# Patient Record
Sex: FEMALE | Race: WHITE | NOT HISPANIC OR LATINO | Employment: FULL TIME | RURAL
[De-identification: names, ages, dates, MRNs, and addresses within clinical notes are randomized per-mention and may not be internally consistent; named-entity substitution may affect disease eponyms.]

---

## 2017-03-07 ENCOUNTER — OFFICE VISIT (OUTPATIENT)
Dept: FAMILY MEDICINE CLINIC | Facility: CLINIC | Age: 62
End: 2017-03-07

## 2017-03-07 VITALS
SYSTOLIC BLOOD PRESSURE: 104 MMHG | DIASTOLIC BLOOD PRESSURE: 78 MMHG | BODY MASS INDEX: 27.75 KG/M2 | TEMPERATURE: 97.6 F | HEIGHT: 67 IN | OXYGEN SATURATION: 99 % | WEIGHT: 176.8 LBS | HEART RATE: 69 BPM

## 2017-03-07 DIAGNOSIS — Z01.818 PREOPERATIVE EVALUATION TO RULE OUT SURGICAL CONTRAINDICATION: Primary | ICD-10-CM

## 2017-03-07 PROBLEM — E78.2 MIXED HYPERLIPIDEMIA: Status: ACTIVE | Noted: 2017-03-07

## 2017-03-07 PROCEDURE — 99213 OFFICE O/P EST LOW 20 MIN: CPT | Performed by: FAMILY MEDICINE

## 2017-03-07 RX ORDER — FLUTICASONE PROPIONATE 50 MCG
SPRAY, SUSPENSION (ML) NASAL
Refills: 3 | COMMUNITY
Start: 2017-02-06 | End: 2017-11-28 | Stop reason: SDUPTHER

## 2017-03-07 RX ORDER — INDAPAMIDE 1.25 MG/1
TABLET, FILM COATED ORAL
Refills: 1 | COMMUNITY
Start: 2017-01-11 | End: 2017-10-03 | Stop reason: SDUPTHER

## 2017-03-07 NOTE — PROGRESS NOTES
"Kisha Gracia Vidhi    1955    Chief Complaint   Patient presents with   • Pre-op Exam       Vitals:    03/07/17 1533   BP: 104/78   Pulse: 69   Temp: 97.6 °F (36.4 °C)   SpO2: 99%   Weight: 176 lb 12.8 oz (80.2 kg)   Height: 67\" (170.2 cm)       Knee Pain    The incident occurred more than 1 week ago. The incident occurred at home. There was no injury mechanism. The pain is present in the right knee. The quality of the pain is described as aching. The pain is at a severity of 3/10. The pain is mild. The pain has been constant since onset. Associated symptoms include an inability to bear weight. She reports no foreign bodies present. Nothing aggravates the symptoms. She has tried acetaminophen for the symptoms. Improvement on treatment: Orthopedic evaluation shows bone-on-bone and total knee replacement advised.       Review of Systems   HENT: Negative.    Respiratory: Negative.    Cardiovascular: Negative.    Gastrointestinal: Negative.    Neurological: Negative.        Past Medical History   Diagnosis Date   • Hyperlipidemia          Current Outpatient Prescriptions:   •  fluticasone (FLONASE) 50 MCG/ACT nasal spray, INHALE 2 SPRAYS INTO NASAL TWICE A DAY, Disp: , Rfl: 3  •  indapamide (LOZOL) 1.25 MG tablet, TAKE 1 TABLET BY MOUTH EVERY DAY, Disp: , Rfl: 1    No Known Allergies    Patient Active Problem List   Diagnosis   • Mixed hyperlipidemia       Social History     Social History   • Marital status:      Spouse name: N/A   • Number of children: N/A   • Years of education: N/A     Social History Main Topics   • Smoking status: Never Smoker   • Smokeless tobacco: Never Used   • Alcohol use No   • Drug use: No   • Sexual activity: Defer     Other Topics Concern   • None     Social History Narrative       Past Surgical History   Procedure Laterality Date   • Colonoscopy  08/26/2016     [facility] DR TRUJILLO       Physical Exam   Constitutional: She appears well-developed and well-nourished.   HENT: " "  Mouth/Throat: Oropharynx is clear and moist.   Cardiovascular: Normal rate and regular rhythm.    Pulmonary/Chest: Effort normal and breath sounds normal. She has no wheezes. She has no rales.   Abdominal: Soft. Bowel sounds are normal.   Neurological: She is alert.   Psychiatric: She has a normal mood and affect.   Vitals reviewed.      Vitals:    03/07/17 1533   BP: 104/78   Pulse: 69   Temp: 97.6 °F (36.4 °C)   SpO2: 99%   Weight: 176 lb 12.8 oz (80.2 kg)   Height: 67\" (170.2 cm)       Kisha was seen today for pre-op exam.    Diagnoses and all orders for this visit:    Preoperative evaluation to rule out surgical contraindication        Problem List Items Addressed This Visit     None      Visit Diagnoses     Preoperative evaluation to rule out surgical contraindication    -  Primary                  In general physical appropriate for a knee replacement on right-pre-op chest x-ray, EKG ,laboratory, urinalysis at [facility] was appropriate-I do not have copies but I assume it was reviewed by the orthopedist                    Rivera Cunningham MD  3/7/2017  "

## 2017-04-25 ENCOUNTER — TRANSCRIBE ORDERS (OUTPATIENT)
Dept: PHYSICAL THERAPY | Facility: HOSPITAL | Age: 62
End: 2017-04-25

## 2017-04-25 DIAGNOSIS — M25.561 RIGHT KNEE PAIN, UNSPECIFIED CHRONICITY: ICD-10-CM

## 2017-04-25 DIAGNOSIS — Z96.651 PRESENCE OF RIGHT ARTIFICIAL KNEE JOINT: Primary | ICD-10-CM

## 2017-05-01 ENCOUNTER — HOSPITAL ENCOUNTER (OUTPATIENT)
Dept: PHYSICAL THERAPY | Facility: HOSPITAL | Age: 62
Setting detail: THERAPIES SERIES
Discharge: HOME | End: 2017-05-01

## 2017-05-01 DIAGNOSIS — Z96.651 PRESENCE OF RIGHT ARTIFICIAL KNEE JOINT: Primary | ICD-10-CM

## 2017-05-01 PROCEDURE — 97110 THERAPEUTIC EXERCISES: CPT | Performed by: PHYSICAL THERAPIST

## 2017-05-01 PROCEDURE — 97161 PT EVAL LOW COMPLEX 20 MIN: CPT | Performed by: PHYSICAL THERAPIST

## 2017-05-05 ENCOUNTER — HOSPITAL ENCOUNTER (OUTPATIENT)
Dept: PHYSICAL THERAPY | Facility: HOSPITAL | Age: 62
Setting detail: THERAPIES SERIES
Discharge: HOME | End: 2017-05-05

## 2017-05-05 DIAGNOSIS — Z96.651 PRESENCE OF RIGHT ARTIFICIAL KNEE JOINT: Primary | ICD-10-CM

## 2017-05-05 PROCEDURE — 97110 THERAPEUTIC EXERCISES: CPT | Performed by: PHYSICAL THERAPIST

## 2017-05-08 ENCOUNTER — HOSPITAL ENCOUNTER (OUTPATIENT)
Dept: PHYSICAL THERAPY | Facility: HOSPITAL | Age: 62
Setting detail: THERAPIES SERIES
Discharge: HOME | End: 2017-05-08

## 2017-05-08 DIAGNOSIS — Z96.651 PRESENCE OF RIGHT ARTIFICIAL KNEE JOINT: Primary | ICD-10-CM

## 2017-05-08 PROCEDURE — 97110 THERAPEUTIC EXERCISES: CPT

## 2017-05-09 ENCOUNTER — HOSPITAL ENCOUNTER (OUTPATIENT)
Dept: PHYSICAL THERAPY | Facility: HOSPITAL | Age: 62
Setting detail: THERAPIES SERIES
Discharge: HOME | End: 2017-05-09

## 2017-05-09 DIAGNOSIS — Z96.651 PRESENCE OF RIGHT ARTIFICIAL KNEE JOINT: Primary | ICD-10-CM

## 2017-05-09 PROCEDURE — 97110 THERAPEUTIC EXERCISES: CPT

## 2017-05-12 ENCOUNTER — HOSPITAL ENCOUNTER (OUTPATIENT)
Dept: PHYSICAL THERAPY | Facility: HOSPITAL | Age: 62
Setting detail: THERAPIES SERIES
Discharge: HOME | End: 2017-05-12

## 2017-05-12 DIAGNOSIS — Z96.651 PRESENCE OF RIGHT ARTIFICIAL KNEE JOINT: Primary | ICD-10-CM

## 2017-05-12 PROCEDURE — 97110 THERAPEUTIC EXERCISES: CPT

## 2017-05-15 ENCOUNTER — HOSPITAL ENCOUNTER (OUTPATIENT)
Dept: PHYSICAL THERAPY | Facility: HOSPITAL | Age: 62
Setting detail: THERAPIES SERIES
Discharge: HOME | End: 2017-05-15

## 2017-05-15 DIAGNOSIS — Z96.651 PRESENCE OF RIGHT ARTIFICIAL KNEE JOINT: Primary | ICD-10-CM

## 2017-05-15 PROCEDURE — 97110 THERAPEUTIC EXERCISES: CPT

## 2017-05-16 ENCOUNTER — HOSPITAL ENCOUNTER (OUTPATIENT)
Dept: PHYSICAL THERAPY | Facility: HOSPITAL | Age: 62
Setting detail: THERAPIES SERIES
Discharge: HOME | End: 2017-05-16

## 2017-05-16 DIAGNOSIS — Z96.651 PRESENCE OF RIGHT ARTIFICIAL KNEE JOINT: Primary | ICD-10-CM

## 2017-05-16 PROCEDURE — 97110 THERAPEUTIC EXERCISES: CPT

## 2017-05-18 ENCOUNTER — HOSPITAL ENCOUNTER (OUTPATIENT)
Dept: PHYSICAL THERAPY | Facility: HOSPITAL | Age: 62
Setting detail: THERAPIES SERIES
Discharge: HOME | End: 2017-05-18

## 2017-05-18 DIAGNOSIS — Z96.651 PRESENCE OF RIGHT ARTIFICIAL KNEE JOINT: Primary | ICD-10-CM

## 2017-05-18 PROCEDURE — 97110 THERAPEUTIC EXERCISES: CPT

## 2017-05-22 ENCOUNTER — HOSPITAL ENCOUNTER (OUTPATIENT)
Dept: PHYSICAL THERAPY | Facility: HOSPITAL | Age: 62
Setting detail: THERAPIES SERIES
Discharge: HOME | End: 2017-05-22

## 2017-05-22 DIAGNOSIS — Z96.651 PRESENCE OF RIGHT ARTIFICIAL KNEE JOINT: Primary | ICD-10-CM

## 2017-05-22 PROCEDURE — 97110 THERAPEUTIC EXERCISES: CPT

## 2017-05-24 ENCOUNTER — HOSPITAL ENCOUNTER (OUTPATIENT)
Dept: PHYSICAL THERAPY | Facility: HOSPITAL | Age: 62
Setting detail: THERAPIES SERIES
Discharge: HOME | End: 2017-05-24

## 2017-05-24 DIAGNOSIS — Z96.651 PRESENCE OF RIGHT ARTIFICIAL KNEE JOINT: Primary | ICD-10-CM

## 2017-05-24 PROCEDURE — 97110 THERAPEUTIC EXERCISES: CPT | Performed by: PHYSICAL THERAPIST

## 2017-05-30 ENCOUNTER — HOSPITAL ENCOUNTER (OUTPATIENT)
Dept: PHYSICAL THERAPY | Facility: HOSPITAL | Age: 62
Setting detail: THERAPIES SERIES
Discharge: HOME | End: 2017-05-30

## 2017-05-30 DIAGNOSIS — Z96.651 PRESENCE OF RIGHT ARTIFICIAL KNEE JOINT: Primary | ICD-10-CM

## 2017-05-30 PROCEDURE — 97110 THERAPEUTIC EXERCISES: CPT

## 2017-05-31 ENCOUNTER — HOSPITAL ENCOUNTER (OUTPATIENT)
Dept: PHYSICAL THERAPY | Facility: HOSPITAL | Age: 62
Setting detail: THERAPIES SERIES
Discharge: HOME | End: 2017-05-31

## 2017-05-31 DIAGNOSIS — Z96.651 PRESENCE OF RIGHT ARTIFICIAL KNEE JOINT: Primary | ICD-10-CM

## 2017-05-31 PROCEDURE — 97110 THERAPEUTIC EXERCISES: CPT

## 2017-06-06 ENCOUNTER — APPOINTMENT (OUTPATIENT)
Dept: PHYSICAL THERAPY | Facility: HOSPITAL | Age: 62
End: 2017-06-06

## 2017-06-06 ENCOUNTER — HOSPITAL ENCOUNTER (OUTPATIENT)
Dept: PHYSICAL THERAPY | Facility: HOSPITAL | Age: 62
Setting detail: THERAPIES SERIES
Discharge: HOME | End: 2017-06-06

## 2017-06-06 DIAGNOSIS — Z96.651 PRESENCE OF RIGHT ARTIFICIAL KNEE JOINT: Primary | ICD-10-CM

## 2017-06-06 PROCEDURE — 97110 THERAPEUTIC EXERCISES: CPT

## 2017-06-06 NOTE — THERAPY TREATMENT NOTE
Outpatient Physical Therapy Ortho Treatment Note  [facility], [place]  Elizabeth Briseno PTA  17  5:08 PM       Patient Name: Kisha Mosher  : 1955  MRN: [id number]  Today's Date: 2017      Visit Date: 2017  Subjective Improvement:   80+%    Attendance:   Approved:         20 visits  MD follow up:         prn  RC date:     17    Visit Dx:    ICD-10-CM ICD-9-CM   1. Presence of right artificial knee joint Z96.651 V43.65       Patient Active Problem List   Diagnosis   • Mixed hyperlipidemia        Past Medical History:   Diagnosis Date   • Hyperlipidemia         Past Surgical History:   Procedure Laterality Date   •  SECTION     • COLONOSCOPY  2016    [facility] DR TRUJILLO   • TOTAL KNEE ARTHROPLASTY Right 2017             PT Ortho       17 1600    Subjective Comments    Subjective Comments Pt states she has worked full time full duty yesterday and today; did a lot of climbing steps quite a bit steeper than here; not hurting but incr swelling and very fatigued.  -PM    Precautions and Contraindications    Precautions/Limitations no known precautions/limitations  -PM    Precautions sp R TKA  -PM    Subjective Pain    Able to rate subjective pain? yes  -PM    Pre-Treatment Pain Level 0  -PM    Post-Treatment Pain Level 0   fatigued  -PM    Right Knee    Extension/Flexion AROM Deficit 0-116   with significant effort; edema incr  -PM      User Key  (r) = Recorded By, (t) = Taken By, (c) = Cosigned By    Initials Name Provider Type    PM Elizabeth Briseno PTA Physical Therapy Assistant                            PT Assessment/Plan       17 1600       PT Assessment    Functional Limitations Impaired gait;Limitation in home management;Limitations in community activities;Performance in leisure activities;Performance in work activities;Performance in self-care ADL  -PM     Impairments Balance;Gait;Edema;Impaired flexibility;Muscle strength;Pain;Range  "of motion  -PM     Assessment Comments Well controlled eccentric stairs and only reports some tightness feeling not pain; some cues for Cybex but progressing; able to SLB x 30\". Worked hard to maintain the 116 flexion, incr edema. Improved control and decr c/o's with stairs.  -PM     Rehab Potential Excellent  -PM     Patient/caregiver participated in establishment of treatment plan and goals Yes  -PM     Patient would benefit from skilled therapy intervention Yes  -PM     PT Plan    PT Frequency 2x/week  -PM     PT Plan Comments DC at next visit; give fitness; Cybex hip abd/add and set up fitness card  -PM       User Key  (r) = Recorded By, (t) = Taken By, (c) = Cosigned By    Initials Name Provider Type    PM Elizabeth Briseno PTA Physical Therapy Assistant                Modalities       06/06/17 1600          Ice    Patient reports will apply ice at home to involved area Yes   ice to go  -PM        User Key  (r) = Recorded By, (t) = Taken By, (c) = Cosigned By    Initials Name Provider Type    PM Elizabeth Briseno PTA Physical Therapy Assistant                Exercises       06/06/17 1600          Subjective Comments    Subjective Comments Pt states she has worked full time full duty yesterday and today; did a lot of climbing steps quite a bit steeper than here; not hurting but incr swelling and very fatigued.  -PM      Subjective Pain    Able to rate subjective pain? yes  -PM      Pre-Treatment Pain Level 0  -PM      Post-Treatment Pain Level 0   fatigued  -PM      Exercise 1    Exercise Name 1 Pro II for ROM  -PM      Resistance 1 --   L4  -PM      Time (Minutes) 1 10  -PM      Exercise 2    Exercise Name 2 HS S/Lunge S  -PM      Reps 2 3  -PM      Time (Seconds) 2 30  -PM      Exercise 3    Exercise Name 3 Incline S  -PM      Reps 3 3  -PM      Time (Seconds) 3 30  -PM      Exercise 4    Exercise Name 4 Recip stairs  -PM      Reps 4 3  -PM      Exercise 5    Exercise Name 5 BOSU step up and over  -PM      " Sets 5 2  -PM      Reps 5 10  -PM      Exercise 6    Exercise Name 6 Reverse BOSU MS  -PM      Cueing 6 Demo  -PM      Sets 6 2  -PM      Reps 6 10  -PM      Exercise 7    Exercise Name 7 SLB  -PM      Reps 7 2  -PM      Time (Seconds) 7 30  -PM      Exercise 8    Exercise Name 8 Cybex LP2  -PM      Cueing 8 Verbal  -PM      Weights/Plates 8 --   100 at 4  -PM      Sets 8 3  -PM      Reps 8 10  -PM      Exercise 9    Exercise Name 9 Cybex LP1  -PM      Cueing 9 Verbal  -PM      Weights/Plates 9 --   60  -PM      Sets 9 3  -PM      Reps 9 10  -PM      Exercise 10    Exercise Name 10 Cybex ham curls  -PM      Cueing 10 Verbal  -PM      Weights/Plates 10 --   50  -PM      Sets 10 3  -PM      Reps 10 10  -PM      Exercise 11    Exercise Name 11 QS prop for ext  -PM      Reps 11 20  -PM      Exercise 12    Exercise Name 12 heelslides  -PM      Time (Minutes) 12 3  -PM        User Key  (r) = Recorded By, (t) = Taken By, (c) = Cosigned By    Initials Name Provider Type    PM Elizabeth Briseno PTA Physical Therapy Assistant                        Manual Rx (last 36 hours)      Manual Treatments       06/06/17 1600          Manual Rx 1    Manual Rx 1 Location R knee  -PM      Manual Rx 1 Type patella mobs; jt mobs for flex   -PM      Manual Rx 1 Grade ii  -PM      Manual Rx 1 Duration 4  -PM        User Key  (r) = Recorded By, (t) = Taken By, (c) = Cosigned By    Initials Name Provider Type    PM Elizabeth Briseno PTA Physical Therapy Assistant                PT OP Goals       06/06/17 1600       PT Short Term Goals    STG Date to Achieve 06/14/17  -PM     STG 1 Indep in HEP  -PM     STG 1 Progress Met  -PM     STG 2 Tolerate 45 min treatment session without increased pain  -PM     STG 2 Progress Met  -PM     STG 3 Demonstrate R knee flex AROM to 115 deg  -PM     STG 3 Progress Met  -PM     STG 4 Ambulate with a non-antalgic gait with no AD  -PM     STG 4 Progress Met  -PM     STG 5 Perform active SLR 3x10 with no quad  lag present  -PM     STG 5 Progress Met  -PM     Long Term Goals    LTG Date to Achieve 06/14/17  -PM     LTG 1 Subjectively report 65% improvement or greater  -PM     LTG 1 Progress Met  -PM     LTG 2 LEFS score to 50/80 or greater  -PM     LTG 2 Progress Met  -PM     LTG 3 Demonstrate R knee flex/ext MMT to 4+/5 or greater  -PM     LTG 3 Progress Met  -PM     LTG 4 Ascend/descend 3 steps, reciprocal pattern, no increased pain or compensation  -PM     LTG 4 Progress Met  -PM     LTG 5 Demonstrate R knee flex AROM to 120 deg or greater  -PM     LTG 5 Progress Progressing  -PM     LTG 6 Demonstrate R SLS for 30 secs or greater  -PM     LTG 6 Progress Met  -PM     LTG 7 Indep with cybex equipment  -PM     LTG 7 Progress Progressing  -PM     Time Calculation    PT Goal Re-Cert Due Date 06/14/17 13/13; MD released; 80+%  -PM       User Key  (r) = Recorded By, (t) = Taken By, (c) = Cosigned By    Initials Name Provider Type    PM Elizabeth Briseno PTA Physical Therapy Assistant                    Time Calculation:   Start Time: 1600  Stop Time: 1655  Time Calculation (min): 55 min  Total Timed Code Minutes- PT: 55 minute(s)    Therapy Charges for Today     Code Description Service Date Service Provider Modifiers Qty    19664503430 HC PT THER PROC EA 15 MIN 6/6/2017 Elizabeth Briseno PTA GP 4    39257252639 HC PT THER SUPP EA 15 MIN 6/6/2017 Elizabeth Briseno PTA GP 1                    Elizabeth Briseno PTA  6/6/2017

## 2017-06-08 ENCOUNTER — HOSPITAL ENCOUNTER (OUTPATIENT)
Dept: PHYSICAL THERAPY | Facility: HOSPITAL | Age: 62
Setting detail: THERAPIES SERIES
Discharge: HOME | End: 2017-06-08

## 2017-06-08 DIAGNOSIS — Z96.651 PRESENCE OF RIGHT ARTIFICIAL KNEE JOINT: Primary | ICD-10-CM

## 2017-06-08 PROCEDURE — 97110 THERAPEUTIC EXERCISES: CPT

## 2017-06-08 NOTE — THERAPY DISCHARGE NOTE
Outpatient Physical Therapy Ortho Treatment Note  [facility]  Elizabeth Briseno, PTA  17  4:25 PM       Patient Name: Kisha Mosher  : 1955  MRN: [id number]  Today's Date: 2017      Visit Date: 2017  Subjective Improvement:   90%    Attendance:   Approved:  20 visits         MD follow up:   released        RC date:   2017      Visit Dx:    ICD-10-CM ICD-9-CM   1. Presence of right artificial knee joint Z96.651 V43.65       Patient Active Problem List   Diagnosis   • Mixed hyperlipidemia        Past Medical History:   Diagnosis Date   • Hyperlipidemia         Past Surgical History:   Procedure Laterality Date   •  SECTION     • COLONOSCOPY  2016    [facility] DR TRUJILLO   • TOTAL KNEE ARTHROPLASTY Right 2017             PT Ortho       17 1300    Subjective Comments    Subjective Comments Pt states she was off yesterday and today and is feeling better. Notes that she is ready for DC today.  -PM    Precautions and Contraindications    Precautions/Limitations no known precautions/limitations  -PM    Precautions sp R TKA  -PM    Subjective Pain    Able to rate subjective pain? yes  -PM    Pre-Treatment Pain Level 0  -PM    Post-Treatment Pain Level 0  -PM    Right Knee    Extension/Flexion AROM Deficit 0-118  -PM      17 1600    Subjective Comments    Subjective Comments Pt states she has worked full time full duty yesterday and today; did a lot of climbing steps quite a bit steeper than here; not hurting but incr swelling and very fatigued.  -PM    Precautions and Contraindications    Precautions/Limitations no known precautions/limitations  -PM    Precautions sp R TKA  -PM    Subjective Pain    Able to rate subjective pain? yes  -PM    Pre-Treatment Pain Level 0  -PM    Post-Treatment Pain Level 0   fatigued  -PM    Right Knee    Extension/Flexion AROM Deficit 0-116   with significant effort; edema incr  -PM      User Key  (r) = Recorded  By, (t) = Taken By, (c) = Cosigned By    Initials Name Provider Type    PM Elizabeth Briseno PTA Physical Therapy Assistant                            PT Assessment/Plan       06/08/17 1300       PT Assessment    Functional Limitations Impaired gait;Limitation in home management;Limitations in community activities;Performance in leisure activities;Performance in work activities;Performance in self-care ADL  -PM     Impairments Balance;Gait;Edema;Impaired flexibility;Muscle strength;Pain;Range of motion  -PM     Assessment Comments Incr flex AROM; pt able to set up cybex equipment w/min verbal cues.  -PM     Rehab Potential Excellent  -PM     Patient/caregiver participated in establishment of treatment plan and goals Yes  -PM     Patient would benefit from skilled therapy intervention Yes  -PM     PT Plan    PT Frequency 2x/week  -PM     PT Plan Comments DC to I HEP/fitness  -PM       User Key  (r) = Recorded By, (t) = Taken By, (c) = Cosigned By    Initials Name Provider Type    PM Elizabeth Briseno PTA Physical Therapy Assistant                Modalities       06/08/17 1300          Ice    Patient reports will apply ice at home to involved area Yes   ice to go  -PM        User Key  (r) = Recorded By, (t) = Taken By, (c) = Cosigned By    Initials Name Provider Type    PM Elizabeth Briseno PTA Physical Therapy Assistant                Exercises       06/08/17 1300          Subjective Comments    Subjective Comments Pt states she was off yesterday and today and is feeling better. Notes that she is ready for DC today.  -PM      Subjective Pain    Able to rate subjective pain? yes  -PM      Pre-Treatment Pain Level 0  -PM      Post-Treatment Pain Level 0  -PM      Exercise 1    Exercise Name 1 Pro II for ROM  -PM      Resistance 1 --   L4  -PM      Time (Minutes) 1 10  -PM      Exercise 2    Exercise Name 2 HS S/Lunge S  -PM      Reps 2 3  -PM      Time (Seconds) 2 30  -PM      Exercise 3    Exercise Name 3  Incline S  -PM      Reps 3 3  -PM      Time (Seconds) 3 30  -PM      Exercise 4    Exercise Name 4 Recip stairs  -PM      Reps 4 8  -PM      Exercise 5    Exercise Name 5 BOSU step up and over F/L  -PM      Sets 5 2  -PM      Reps 5 10  -PM      Exercise 6    Exercise Name 6 Reverse BOSU MS  -PM      Cueing 6 Demo  -PM      Sets 6 2  -PM      Reps 6 10  -PM      Exercise 7    Exercise Name 7 SLB  -PM      Reps 7 2  -PM      Time (Seconds) 7 30  -PM      Exercise 8    Exercise Name 8 Cybex LP2  -PM      Cueing 8 Verbal  -PM      Weights/Plates 8 --   100 at 4  -PM      Sets 8 3  -PM      Reps 8 10  -PM      Exercise 9    Exercise Name 9 Cybex LP1  -PM      Cueing 9 Verbal  -PM      Weights/Plates 9 --   60  -PM      Sets 9 3  -PM      Reps 9 10  -PM      Exercise 10    Exercise Name 10 Cybex ham curls  -PM      Cueing 10 Verbal  -PM      Weights/Plates 10 --   50  -PM      Sets 10 3  -PM      Reps 10 10  -PM      Exercise 11    Exercise Name 11 QS prop for ext  -PM      Reps 11 20  -PM      Exercise 12    Exercise Name 12 heelslides  -PM      Time (Minutes) 12 3  -PM      Exercise 13    Exercise Name 13 Cybex hip abd/add  -PM      Cueing 13 Verbal  -PM      Weights/Plates 13 --   40  -PM      Sets 13 2  -PM      Reps 13 10  -PM        User Key  (r) = Recorded By, (t) = Taken By, (c) = Cosigned By    Initials Name Provider Type    PM Elizabeth Briseno PTA Physical Therapy Assistant                        Manual Rx (last 36 hours)      Manual Treatments       06/08/17 1300          Manual Rx 1    Manual Rx 1 Location R knee  -PM      Manual Rx 1 Type patella mobs; jt mobs for flex   -PM      Manual Rx 1 Grade ii  -PM      Manual Rx 1 Duration 4  -PM        User Key  (r) = Recorded By, (t) = Taken By, (c) = Cosigned By    Initials Name Provider Type    PM Elizabeth Briseno PTA Physical Therapy Assistant                PT OP Goals       06/08/17 1300       PT Short Term Goals    STG Date to Achieve 06/14/17  -PM      STG 1 Indep in HEP  -PM     STG 1 Progress Met  -PM     STG 2 Tolerate 45 min treatment session without increased pain  -PM     STG 2 Progress Met  -PM     STG 3 Demonstrate R knee flex AROM to 115 deg  -PM     STG 3 Progress Met  -PM     STG 4 Ambulate with a non-antalgic gait with no AD  -PM     STG 4 Progress Met  -PM     STG 5 Perform active SLR 3x10 with no quad lag present  -PM     STG 5 Progress Met  -PM     Long Term Goals    LTG Date to Achieve 06/14/17  -PM     LTG 1 Subjectively report 65% improvement or greater  -PM     LTG 1 Progress Met  -PM     LTG 2 LEFS score to 50/80 or greater  -PM     LTG 2 Progress Met  -PM     LTG 3 Demonstrate R knee flex/ext MMT to 4+/5 or greater  -PM     LTG 3 Progress Met  -PM     LTG 4 Ascend/descend 3 steps, reciprocal pattern, no increased pain or compensation  -PM     LTG 4 Progress Met  -PM     LTG 5 Demonstrate R knee flex AROM to 120 deg or greater  -PM     LTG 5 Progress Progressing  -PM     LTG 6 Demonstrate R SLS for 30 secs or greater  -PM     LTG 6 Progress Met  -PM     LTG 7 Indep with cybex equipment  -PM     LTG 7 Progress Met  -PM     Time Calculation    PT Goal Re-Cert Due Date 06/14/17  -PM       User Key  (r) = Recorded By, (t) = Taken By, (c) = Cosigned By    Initials Name Provider Type    PM Elizabeth Briseno PTA Physical Therapy Assistant                Therapy Education       06/08/17 1300          Therapy Education    Given HEP  -PM      Program Reinforced  -PM      How Provided Verbal  -PM      Provided to Patient  -PM      Level of Understanding Verbalized;Demonstrated  -PM        User Key  (r) = Recorded By, (t) = Taken By, (c) = Cosigned By    Initials Name Provider Type    PM Elizabeth Briseno PTA Physical Therapy Assistant                Outcome Measures       06/08/17 1300          Lower Extremity Functional Index    Any of your usual work, housework or school activities 4  -PM      Your usual hobbies, recreational or sporting activities  3  -PM      Getting into or out of the bath 4  -PM      Walking between rooms 4  -PM      Putting on your shoes or socks 3  -PM      Squatting 3  -PM      Lifting an object, like a bag of groceries from the floor 4  -PM      Performing light activities around your home 4  -PM      Performing heavy activities around your home 3  -PM      Getting into or out of a car 4  -PM      Walking 2 blocks 4  -PM      Walking a mile 4  -PM      Going up or down 10 stairs (about 1 flight of stairs) 3  -PM      Standing for 1 hour 4  -PM      Sitting for 1 hour 4  -PM      Running on even ground 0  -PM      Running on uneven ground 0  -PM      Making sharp turns while running fast 0  -PM      Hopping 0  -PM      Rolling over in bed 4  -PM      Total 59  -PM      Functional Assessment    Outcome Measure Options Lower Extremity Functional Scale (LEFS)  -PM        User Key  (r) = Recorded By, (t) = Taken By, (c) = Cosigned By    Initials Name Provider Type    PM Elizabeth Briseno PTA Physical Therapy Assistant            Time Calculation:   Start Time: 1342  Stop Time: 1432  Time Calculation (min): 50 min  Total Timed Code Minutes- PT: 50 minute(s)    Therapy Charges for Today     Code Description Service Date Service Provider Modifiers Qty    07109605296 HC PT THER PROC EA 15 MIN 6/8/2017 Elizabeth Briseno PTA GP 3          PT G-Codes  Outcome Measure Options: Lower Extremity Functional Scale (LEFS)         Elizabeth Briseno PTA  6/8/2017

## 2017-10-03 RX ORDER — INDAPAMIDE 1.25 MG/1
TABLET, FILM COATED ORAL
Qty: 90 TABLET | Refills: 0 | Status: SHIPPED | OUTPATIENT
Start: 2017-10-03 | End: 2017-11-28 | Stop reason: SDUPTHER

## 2017-10-16 ENCOUNTER — LAB (OUTPATIENT)
Dept: FAMILY MEDICINE CLINIC | Facility: CLINIC | Age: 62
End: 2017-10-16

## 2017-10-16 DIAGNOSIS — Z00.00 ROUTINE GENERAL MEDICAL EXAMINATION AT A HEALTH CARE FACILITY: Primary | ICD-10-CM

## 2017-10-16 DIAGNOSIS — E55.9 VITAMIN D INSUFFICIENCY: ICD-10-CM

## 2017-10-17 LAB
25(OH)D3+25(OH)D2 SERPL-MCNC: 38.3 NG/ML (ref 30–100)
ALBUMIN SERPL-MCNC: 4.5 G/DL (ref 3.5–5)
ALBUMIN/GLOB SERPL: 1.5 G/DL (ref 1.1–2.5)
ALP SERPL-CCNC: 111 U/L (ref 24–120)
ALT SERPL-CCNC: 34 U/L (ref 0–54)
AST SERPL-CCNC: 26 U/L (ref 7–45)
BASOPHILS # BLD AUTO: 0.03 10*3/MM3 (ref 0–0.2)
BASOPHILS NFR BLD AUTO: 0.6 % (ref 0–2)
BILIRUB SERPL-MCNC: 0.7 MG/DL (ref 0.1–1)
BUN SERPL-MCNC: 17 MG/DL (ref 5–21)
BUN/CREAT SERPL: 22.1 (ref 7–25)
CALCIUM SERPL-MCNC: 10 MG/DL (ref 8.4–10.4)
CHLORIDE SERPL-SCNC: 98 MMOL/L (ref 98–110)
CHOLEST SERPL-MCNC: 252 MG/DL (ref 130–200)
CO2 SERPL-SCNC: 35 MMOL/L (ref 24–31)
CREAT SERPL-MCNC: 0.77 MG/DL (ref 0.5–1.4)
EOSINOPHIL # BLD AUTO: 0.23 10*3/MM3 (ref 0–0.7)
EOSINOPHIL NFR BLD AUTO: 4.6 % (ref 0–4)
ERYTHROCYTE [DISTWIDTH] IN BLOOD BY AUTOMATED COUNT: 13.9 % (ref 12–15)
GLOBULIN SER CALC-MCNC: 3.1 GM/DL
GLUCOSE SERPL-MCNC: 95 MG/DL (ref 70–100)
HCT VFR BLD AUTO: 49.5 % (ref 37–47)
HDLC SERPL-MCNC: 83 MG/DL
HGB BLD-MCNC: 15.8 G/DL (ref 12–16)
IMM GRANULOCYTES # BLD: 0.01 10*3/MM3 (ref 0–0.03)
IMM GRANULOCYTES NFR BLD: 0.2 % (ref 0–5)
LDLC SERPL CALC-MCNC: 155 MG/DL (ref 0–99)
LYMPHOCYTES # BLD AUTO: 1.89 10*3/MM3 (ref 0.72–4.86)
LYMPHOCYTES NFR BLD AUTO: 38.2 % (ref 15–45)
MCH RBC QN AUTO: 29.5 PG (ref 28–32)
MCHC RBC AUTO-ENTMCNC: 31.9 G/DL (ref 33–36)
MCV RBC AUTO: 92.5 FL (ref 82–98)
MONOCYTES # BLD AUTO: 0.53 10*3/MM3 (ref 0.19–1.3)
MONOCYTES NFR BLD AUTO: 10.7 % (ref 4–12)
NEUTROPHILS # BLD AUTO: 2.26 10*3/MM3 (ref 1.87–8.4)
NEUTROPHILS NFR BLD AUTO: 45.7 % (ref 39–78)
PLATELET # BLD AUTO: 216 10*3/MM3 (ref 130–400)
POTASSIUM SERPL-SCNC: 4.7 MMOL/L (ref 3.5–5.3)
PROT SERPL-MCNC: 7.6 G/DL (ref 6.3–8.7)
RBC # BLD AUTO: 5.35 10*6/MM3 (ref 4.2–5.4)
SODIUM SERPL-SCNC: 140 MMOL/L (ref 135–145)
T4 FREE SERPL-MCNC: 1.33 NG/DL (ref 0.78–2.19)
TRIGL SERPL-MCNC: 70 MG/DL (ref 0–149)
TSH SERPL DL<=0.005 MIU/L-ACNC: 1.43 MIU/ML (ref 0.47–4.68)
VLDLC SERPL CALC-MCNC: 14 MG/DL
WBC # BLD AUTO: 4.95 10*3/MM3 (ref 4.8–10.8)

## 2017-10-18 ENCOUNTER — OFFICE VISIT (OUTPATIENT)
Dept: FAMILY MEDICINE CLINIC | Facility: CLINIC | Age: 62
End: 2017-10-18

## 2017-10-18 VITALS
OXYGEN SATURATION: 98 % | BODY MASS INDEX: 28.72 KG/M2 | TEMPERATURE: 98.3 F | DIASTOLIC BLOOD PRESSURE: 84 MMHG | HEART RATE: 76 BPM | HEIGHT: 67 IN | SYSTOLIC BLOOD PRESSURE: 130 MMHG | WEIGHT: 183 LBS

## 2017-10-18 DIAGNOSIS — Z23 NEED FOR PROPHYLACTIC VACCINATION AGAINST STREPTOCOCCUS PNEUMONIAE (PNEUMOCOCCUS): ICD-10-CM

## 2017-10-18 DIAGNOSIS — Z00.00 ANNUAL PHYSICAL EXAM: Primary | ICD-10-CM

## 2017-10-18 DIAGNOSIS — Z12.31 ENCOUNTER FOR SCREENING MAMMOGRAM FOR MALIGNANT NEOPLASM OF BREAST: ICD-10-CM

## 2017-10-18 DIAGNOSIS — Z23 NEED FOR PROPHYLACTIC VACCINATION WITH COMBINED DIPHTHERIA-TETANUS-PERTUSSIS (DTP) VACCINE: ICD-10-CM

## 2017-10-18 DIAGNOSIS — Z12.4 SCREENING FOR MALIGNANT NEOPLASM OF CERVIX: ICD-10-CM

## 2017-10-18 DIAGNOSIS — Z12.12 SCREENING FOR MALIGNANT NEOPLASM OF THE RECTUM: ICD-10-CM

## 2017-10-18 DIAGNOSIS — Z00.00 ROUTINE GENERAL MEDICAL EXAMINATION AT A HEALTH CARE FACILITY: ICD-10-CM

## 2017-10-18 DIAGNOSIS — Z23 NEED FOR INFLUENZA VACCINATION: ICD-10-CM

## 2017-10-18 LAB
BILIRUB BLD-MCNC: NEGATIVE MG/DL
CLARITY, POC: CLEAR
COLOR UR: YELLOW
GLUCOSE UR STRIP-MCNC: NEGATIVE MG/DL
KETONES UR QL: ABNORMAL
LEUKOCYTE EST, POC: ABNORMAL
NITRITE UR-MCNC: NEGATIVE MG/ML
PH UR: 6 [PH] (ref 5–8)
PROT UR STRIP-MCNC: ABNORMAL MG/DL
RBC # UR STRIP: ABNORMAL /UL
SP GR UR: 1.02 (ref 1–1.03)
UROBILINOGEN UR QL: NORMAL

## 2017-10-18 PROCEDURE — 90472 IMMUNIZATION ADMIN EACH ADD: CPT | Performed by: FAMILY MEDICINE

## 2017-10-18 PROCEDURE — 90471 IMMUNIZATION ADMIN: CPT | Performed by: FAMILY MEDICINE

## 2017-10-18 PROCEDURE — 90715 TDAP VACCINE 7 YRS/> IM: CPT | Performed by: FAMILY MEDICINE

## 2017-10-18 PROCEDURE — 90670 PCV13 VACCINE IM: CPT | Performed by: FAMILY MEDICINE

## 2017-10-18 PROCEDURE — 81003 URINALYSIS AUTO W/O SCOPE: CPT | Performed by: FAMILY MEDICINE

## 2017-10-18 PROCEDURE — 90630 INFLUENZA VAC INTRADERMAL QUADRIVALENT: CPT | Performed by: FAMILY MEDICINE

## 2017-10-18 PROCEDURE — 99396 PREV VISIT EST AGE 40-64: CPT | Performed by: FAMILY MEDICINE

## 2017-10-18 NOTE — PROGRESS NOTES
Subjective   Kisha Mosher is a 62 y.o. female.     Arthritis   Presents for follow-up visit. She complains of stiffness. The symptoms have been stable. Affected locations include the right knee. Her pain is at a severity of 0/10.        The following portions of the patient's history were reviewed and updated as appropriate: allergies, current medications, past family history, past medical history, past social history, past surgical history and problem list.    Review of Systems   Cardiovascular: Negative for leg swelling.   Gastrointestinal:        Colonoscopy 2016   Musculoskeletal: Positive for arthritis and stiffness.        Right knee replaced   All other systems reviewed and are negative.      Objective   Physical Exam   Constitutional: She is oriented to person, place, and time. She appears well-developed and well-nourished.   HENT:   Right Ear: External ear normal.   Left Ear: External ear normal.   Mouth/Throat: Oropharynx is clear and moist.   Eyes: Conjunctivae and EOM are normal. Pupils are equal, round, and reactive to light.   Neck: No thyromegaly present.   Good carotid pulses   Cardiovascular: Regular rhythm.    Pulmonary/Chest: Effort normal and breath sounds normal.   Breasts no masses noted   Abdominal: Soft. Bowel sounds are normal.   Genitourinary: Vagina normal and uterus normal.   Genitourinary Comments: Cervix present Pap smear taken uterus normal size no adnexal masses   Musculoskeletal: She exhibits edema.   Lymphadenopathy:     She has no cervical adenopathy.   Neurological: She is alert and oriented to person, place, and time.   Skin: Skin is warm and dry.   Psychiatric: She has a normal mood and affect. Her behavior is normal. Judgment and thought content normal.   Nursing note and vitals reviewed.      Assessment/Plan   Problems Addressed this Visit     None      Visit Diagnoses     Annual physical exam    -  Primary    Relevant Orders    POCT urinalysis dipstick, automated     Routine general medical examination at a health care facility        Relevant Orders    Hepatitis C antibody    Need for prophylactic vaccination with combined diphtheria-tetanus-pertussis (DTP) vaccine        Relevant Orders    Tdap Vaccine Greater Than or Equal To 6yo IM    Need for prophylactic vaccination against Streptococcus pneumoniae (pneumococcus)        Relevant Orders    Pneumococcal Conjugate Vaccine 13-Valent All    Encounter for screening mammogram for malignant neoplasm of breast        Relevant Orders    Mammo Screening Bilateral With CAD    Screening for malignant neoplasm of cervix        Relevant Orders    Pap IG (Image Guided) - ThinPrep Vial, Cervix    Need for influenza vaccination        Relevant Orders    Flu Vaccine Intradermal Quad 18-64YR (FLUZONE INJ [id number])    Screening for malignant neoplasm of the rectum        Relevant Orders    POC Occult Blood X 3, Stool          Plan above plus low fat diet     this patient fell about a week before this October visit.  She was working, slipped and hurt her right knee and did not hit her chest directly but noticing soreness.

## 2017-10-18 NOTE — PATIENT INSTRUCTIONS
Exercising to Lose Weight  Exercising can help you to lose weight. In order to lose weight through exercise, you need to do vigorous-intensity exercise. You can tell that you are exercising with vigorous intensity if you are breathing very hard and fast and cannot hold a conversation while exercising.  Moderate-intensity exercise helps to maintain your current weight. You can tell that you are exercising at a moderate level if you have a higher heart rate and faster breathing, but you are still able to hold a conversation.  HOW OFTEN SHOULD I EXERCISE?  Choose an activity that you enjoy and set realistic goals. Your health care provider can help you to make an activity plan that works for you. Exercise regularly as directed by your health care provider. This may include:  · Doing resistance training twice each week, such as:    Push-ups.    Sit-ups.    Lifting weights.    Using resistance bands.  · Doing a given intensity of exercise for a given amount of time. Choose from these options:    150 minutes of moderate-intensity exercise every week.    75 minutes of vigorous-intensity exercise every week.    A mix of moderate-intensity and vigorous-intensity exercise every week.  Children, pregnant women, people who are out of shape, people who are overweight, and older adults may need to consult a health care provider for individual recommendations. If you have any sort of medical condition, be sure to consult your health care provider before starting a new exercise program.  WHAT ARE SOME ACTIVITIES THAT CAN HELP ME TO LOSE WEIGHT?   · Walking at a rate of at least 4.5 miles an hour.  · Jogging or running at a rate of 5 miles per hour.  · Biking at a rate of at least 10 miles per hour.  · Lap swimming.  · Roller-skating or in-line skating.  · Cross-country skiing.  · Vigorous competitive sports, such as football, basketball, and soccer.  · Jumping rope.  · Aerobic dancing.  HOW CAN I BE MORE ACTIVE IN MY DAY-TO-DAY  ACTIVITIES?  · Use the stairs instead of the elevator.  · Take a walk during your lunch break.  · If you drive, park your car farther away from work or school.  · If you take public transportation, get off one stop early and walk the rest of the way.  · Make all of your phone calls while standing up and walking around.  · Get up, stretch, and walk around every 30 minutes throughout the day.  WHAT GUIDELINES SHOULD I FOLLOW WHILE EXERCISING?  · Do not exercise so much that you hurt yourself, feel dizzy, or get very short of breath.  · Consult your health care provider prior to starting a new exercise program.  · Wear comfortable clothes and shoes with good support.  · Drink plenty of water while you exercise to prevent dehydration or heat stroke. Body water is lost during exercise and must be replaced.  · Work out until you breathe faster and your heart beats faster.     This information is not intended to replace advice given to you by your health care provider. Make sure you discuss any questions you have with your health care provider.     Document Released: 01/20/2012 Document Revised: 01/08/2016 Document Reviewed: 05/21/2015  [employer] Interactive Patient Education ©2017 [employer]  Fat and Cholesterol Restricted Diet  High levels of fat and cholesterol in your blood may lead to various health problems, such as diseases of the heart, blood vessels, gallbladder, liver, and pancreas. Fats are concentrated sources of energy that come in various forms. Certain types of fat, including saturated fat, may be harmful in excess. Cholesterol is a substance needed by your body in small amounts. Your body makes all the cholesterol it needs. Excess cholesterol comes from the food you eat.  When you have high levels of cholesterol and saturated fat in your blood, health problems can develop because the excess fat and cholesterol will gather along the walls of your blood vessels, causing them to narrow. Choosing the right  "foods will help you control your intake of fat and cholesterol. This will help keep the levels of these substances in your blood within normal limits and reduce your risk of disease.  WHAT IS MY PLAN?  Your health care provider recommends that you:  · Get no more than __________ % of the total calories in your daily diet from fat.  · Limit your intake of saturated fat to less than ______% of your total calories each day.  · Limit the amount of cholesterol in your diet to less than _________mg per day.  WHAT TYPES OF FAT SHOULD I CHOOSE?  · Choose healthy fats more often. Choose monounsaturated and polyunsaturated fats, such as olive and canola oil, flaxseeds, walnuts, almonds, and seeds.  · Eat more omega-3 fats. Good choices include salmon, mackerel, sardines, tuna, flaxseed oil, and ground flaxseeds. Aim to eat fish at least two times a week.  · Limit saturated fats. Saturated fats are primarily found in animal products, such as meats, butter, and cream. Plant sources of saturated fats include palm oil, palm kernel oil, and coconut oil.  · Avoid foods with partially hydrogenated oils in them. These contain trans fats. Examples of foods that contain trans fats are stick margarine, some tub margarines, cookies, crackers, and other baked goods.  WHAT GENERAL GUIDELINES DO I NEED TO FOLLOW?  These guidelines for healthy eating will help you control your intake of fat and cholesterol:  · Check food labels carefully to identify foods with trans fats or high amounts of saturated fat.  · Fill one half of your plate with vegetables and green salads.  · Fill one fourth of your plate with whole grains. Look for the word \"whole\" as the first word in the ingredient list.  · Fill one fourth of your plate with lean protein foods.  · Limit fruit to two servings a day. Choose fruit instead of juice.  · Eat more foods that contain soluble fiber. Examples of foods that contain this type of fiber are apples, broccoli, carrots, beans, " peas, and barley. Aim to get 20-30 g of fiber per day.  · Eat more home-cooked food and less restaurant, buffet, and fast food.  · Limit or avoid alcohol.  · Limit foods high in starch and sugar.  · Limit fried foods.  · Cook foods using methods other than frying. Baking, boiling, grilling, and broiling are all great options.  · Lose weight if you are overweight. Losing just 5-10% of your initial body weight can help your overall health and prevent diseases such as diabetes and heart disease.  WHAT FOODS CAN I EAT?  Grains  Whole grains, such as whole wheat or whole grain breads, crackers, cereals, and pasta. Unsweetened oatmeal, bulgur, barley, quinoa, or brown rice. Corn or whole wheat flour tortillas.  Vegetables  Fresh or frozen vegetables (raw, steamed, roasted, or grilled). Green salads.  Fruits  All fresh, canned (in natural juice), or frozen fruits.  Meat and Other Protein Products  Ground beef (85% or leaner), grass-fed beef, or beef trimmed of fat. Skinless chicken or turkey. Ground chicken or turkey. Pork trimmed of fat. All fish and seafood. Eggs. Dried beans, peas, or lentils. Unsalted nuts or seeds. Unsalted canned or dry beans.  Dairy  Low-fat dairy products, such as skim or 1% milk, 2% or reduced-fat cheeses, low-fat ricotta or cottage cheese, or plain low-fat yogurt.  Fats and Oils  Tub margarines without trans fats. Light or reduced-fat mayonnaise and salad dressings. Avocado. Olive, canola, sesame, or safflower oils. Natural peanut or almond butter (choose ones without added sugar and oil).  The items listed above may not be a complete list of recommended foods or beverages. Contact your dietitian for more options.  WHAT FOODS ARE NOT RECOMMENDED?  Grains  White bread. White pasta. White rice. Cornbread. Bagels, pastries, and croissants. Crackers that contain trans fat.  Vegetables  White potatoes. Corn. Creamed or fried vegetables. Vegetables in a cheese sauce.  Fruits  Dried fruits. Canned fruit  in light or heavy syrup. Fruit juice.  Meat and Other Protein Products  Fatty cuts of meat. Ribs, chicken wings, navarro, sausage, bologna, salami, chitterlings, fatback, hot dogs, bratwurst, and packaged luncheon meats. Liver and organ meats.  Dairy  Whole or 2% milk, cream, half-and-half, and cream cheese. Whole milk cheeses. Whole-fat or sweetened yogurt. Full-fat cheeses. Nondairy creamers and whipped toppings. Processed cheese, cheese spreads, or cheese curds.  Sweets and Desserts  Corn syrup, sugars, honey, and molasses. Candy. Jam and jelly. Syrup. Sweetened cereals. Cookies, pies, cakes, donuts, muffins, and ice cream.  Fats and Oils  Butter, stick margarine, lard, shortening, ghee, or navarro fat. Coconut, palm kernel, or palm oils.  Beverages  Alcohol. Sweetened drinks (such as sodas, lemonade, and fruit drinks or punches).  The items listed above may not be a complete list of foods and beverages to avoid. Contact your dietitian for more information.     This information is not intended to replace advice given to you by your health care provider. Make sure you discuss any questions you have with your health care provider.     Document Released: 12/18/2006 Document Revised: 01/08/2016 Document Reviewed: 03/18/2015  [employer] Interactive Patient Education ©2017 [employer]

## 2017-10-20 LAB
CYTOLOGIST CVX/VAG CYTO: NORMAL
CYTOLOGY CVX/VAG DOC THIN PREP: NORMAL
DX ICD CODE: NORMAL
HIV 1 & 2 AB SER-IMP: NORMAL
OTHER STN SPEC: NORMAL
PATH REPORT.FINAL DX SPEC: NORMAL
STAT OF ADQ CVX/VAG CYTO-IMP: NORMAL

## 2017-10-24 DIAGNOSIS — Z12.31 ENCOUNTER FOR SCREENING MAMMOGRAM FOR MALIGNANT NEOPLASM OF BREAST: ICD-10-CM

## 2017-10-26 LAB
Lab: NORMAL
Lab: NORMAL
VERBAL ADD-ON: NORMAL

## 2017-10-30 LAB
HCV AB S/CO SERPL IA: <0.1 S/CO RATIO (ref 0–0.9)
WRITTEN AUTHORIZATION: NORMAL

## 2017-11-01 ENCOUNTER — OFFICE VISIT (OUTPATIENT)
Dept: FAMILY MEDICINE CLINIC | Facility: CLINIC | Age: 62
End: 2017-11-01

## 2017-11-01 VITALS
BODY MASS INDEX: 29.47 KG/M2 | WEIGHT: 187.8 LBS | HEIGHT: 67 IN | DIASTOLIC BLOOD PRESSURE: 78 MMHG | OXYGEN SATURATION: 99 % | HEART RATE: 77 BPM | TEMPERATURE: 97.7 F | SYSTOLIC BLOOD PRESSURE: 112 MMHG

## 2017-11-01 DIAGNOSIS — R07.9 CHEST PAIN, UNSPECIFIED TYPE: Primary | ICD-10-CM

## 2017-11-01 PROCEDURE — 99213 OFFICE O/P EST LOW 20 MIN: CPT | Performed by: FAMILY MEDICINE

## 2017-11-01 NOTE — PROGRESS NOTES
Subjective   Kisha Mosher is a 62 y.o. female.     URI    The current episode started 1 to 4 weeks ago. The problem has been unchanged. There has been no fever. Associated symptoms include chest pain. She has tried nothing for the symptoms.       The following portions of the patient's history were reviewed and updated as appropriate: allergies, current medications, past family history, past medical history, past social history, past surgical history and problem list.    Review of Systems   Constitutional: Negative for chills and fever.   Cardiovascular: Positive for chest pain. Negative for leg swelling.   Musculoskeletal:        Costal chondral pain to percussion on the right       Objective   Physical Exam   Constitutional: She is oriented to person, place, and time.   Cardiovascular: Normal rate and regular rhythm.    Pulmonary/Chest: Effort normal and breath sounds normal. She has no wheezes. She has no rales.   Musculoskeletal:   Mild contusion right knee-replacement-full extension no evidence of instability   Neurological: She is alert and oriented to person, place, and time.   Psychiatric: She has a normal mood and affect. Her behavior is normal.   Nursing note and vitals reviewed.      Assessment/Plan   Kisha was seen today for uri and knee pain.    Diagnoses and all orders for this visit:    Chest pain, unspecified type  -     XR Chest PA & Lateral; Future           Plan above plus Aleve

## 2017-11-02 DIAGNOSIS — R07.9 CHEST PAIN, UNSPECIFIED TYPE: ICD-10-CM

## 2017-11-17 LAB
EXPIRATION DATE 2: NORMAL
EXPIRATION DATE 3: NORMAL
EXPIRATION DATE: NORMAL
GASTROCULT GAST QL: NEGATIVE
HEMOCCULT SP2 STL QL: NEGATIVE
HEMOCCULT SP3 STL QL: NEGATIVE
Lab: NORMAL

## 2017-11-17 PROCEDURE — 82270 OCCULT BLOOD FECES: CPT | Performed by: FAMILY MEDICINE

## 2017-11-20 ENCOUNTER — TELEPHONE (OUTPATIENT)
Dept: FAMILY MEDICINE CLINIC | Facility: CLINIC | Age: 62
End: 2017-11-20

## 2017-11-28 ENCOUNTER — OFFICE VISIT (OUTPATIENT)
Dept: FAMILY MEDICINE CLINIC | Facility: CLINIC | Age: 62
End: 2017-11-28

## 2017-11-28 VITALS
SYSTOLIC BLOOD PRESSURE: 150 MMHG | BODY MASS INDEX: 29.88 KG/M2 | OXYGEN SATURATION: 98 % | WEIGHT: 190.4 LBS | TEMPERATURE: 98.1 F | DIASTOLIC BLOOD PRESSURE: 88 MMHG | HEIGHT: 67 IN | HEART RATE: 70 BPM

## 2017-11-28 DIAGNOSIS — R07.9 RIGHT-SIDED CHEST PAIN: Primary | ICD-10-CM

## 2017-11-28 PROCEDURE — 96372 THER/PROPH/DIAG INJ SC/IM: CPT | Performed by: FAMILY MEDICINE

## 2017-11-28 PROCEDURE — 99213 OFFICE O/P EST LOW 20 MIN: CPT | Performed by: FAMILY MEDICINE

## 2017-11-28 RX ORDER — METHYLPREDNISOLONE ACETATE 40 MG/ML
40 INJECTION, SUSPENSION INTRA-ARTICULAR; INTRALESIONAL; INTRAMUSCULAR; SOFT TISSUE ONCE
Status: COMPLETED | OUTPATIENT
Start: 2017-11-28 | End: 2017-11-28

## 2017-11-28 RX ORDER — INDAPAMIDE 1.25 MG/1
1.25 TABLET, FILM COATED ORAL DAILY
Qty: 90 TABLET | Refills: 0 | Status: SHIPPED | OUTPATIENT
Start: 2017-11-28 | End: 2019-06-18

## 2017-11-28 RX ORDER — FLUTICASONE PROPIONATE 50 MCG
1 SPRAY, SUSPENSION (ML) NASAL 2 TIMES DAILY
Qty: 1 BOTTLE | Refills: 3 | Status: SHIPPED | OUTPATIENT
Start: 2017-11-28 | End: 2018-04-17 | Stop reason: SDUPTHER

## 2017-11-28 RX ORDER — PREDNISONE 20 MG/1
TABLET ORAL
Qty: 15 TABLET | Refills: 0 | Status: SHIPPED | OUTPATIENT
Start: 2017-11-28 | End: 2017-12-08

## 2017-11-28 RX ADMIN — METHYLPREDNISOLONE ACETATE 40 MG: 40 INJECTION, SUSPENSION INTRA-ARTICULAR; INTRALESIONAL; INTRAMUSCULAR; SOFT TISSUE at 16:06

## 2017-11-28 NOTE — PROGRESS NOTES
Subjective   Kisha Mosher is a 62 y.o. female.     Cough   This is a new problem. The current episode started more than 1 month ago. The problem has been unchanged. The problem occurs constantly. The cough is non-productive. Associated symptoms include chest pain. Nothing aggravates the symptoms. She has tried nothing for the symptoms. The treatment provided no relief.       The following portions of the patient's history were reviewed and updated as appropriate: allergies, current medications, past family history, past medical history, past social history, past surgical history and problem list.    Review of Systems   Respiratory: Positive for cough.         Chest x-ray 3 weeks ago normal   Cardiovascular: Positive for chest pain.       Objective   Physical Exam   Constitutional: She is oriented to person, place, and time. She appears well-developed and well-nourished.   Cardiovascular: Normal rate and regular rhythm.    Pulmonary/Chest: Effort normal and breath sounds normal.   Point tenderness costal sternal junction on the right   Musculoskeletal: She exhibits no edema.   Neurological: She is alert and oriented to person, place, and time.   Psychiatric: She has a normal mood and affect. Her behavior is normal. Judgment and thought content normal.   Nursing note and vitals reviewed.      Assessment/Plan   Kisha was seen today for cough.    Diagnoses and all orders for this visit:    Right-sided chest pain  -     NM Bone Scan Limited; Future    Other orders  -     fluticasone (FLONASE) 50 MCG/ACT nasal spray; 1 spray into each nostril 2 (Two) Times a Day.  -     indapamide (LOZOL) 1.25 MG tablet; Take 1 tablet by mouth Daily.  -     predniSONE (DELTASONE) 20 MG tablet; 2 pills on Wednesday and Thursday morning then 1 pill till medicine gone           Plan bone scan-rule out fracture plus above

## 2017-12-06 DIAGNOSIS — R07.9 RIGHT-SIDED CHEST PAIN: ICD-10-CM

## 2017-12-08 ENCOUNTER — OFFICE VISIT (OUTPATIENT)
Dept: FAMILY MEDICINE CLINIC | Facility: CLINIC | Age: 62
End: 2017-12-08

## 2017-12-08 VITALS
BODY MASS INDEX: 29.57 KG/M2 | HEIGHT: 67 IN | HEART RATE: 78 BPM | TEMPERATURE: 98.2 F | OXYGEN SATURATION: 98 % | DIASTOLIC BLOOD PRESSURE: 74 MMHG | SYSTOLIC BLOOD PRESSURE: 126 MMHG | WEIGHT: 188.4 LBS

## 2017-12-08 DIAGNOSIS — S22.41XG CLOSED FRACTURE OF MULTIPLE RIBS OF RIGHT SIDE WITH DELAYED HEALING, SUBSEQUENT ENCOUNTER: Primary | ICD-10-CM

## 2017-12-08 PROCEDURE — 99212 OFFICE O/P EST SF 10 MIN: CPT | Performed by: FAMILY MEDICINE

## 2017-12-08 RX ORDER — ACETAMINOPHEN 500 MG
500 TABLET ORAL EVERY 6 HOURS PRN
COMMUNITY
End: 2018-12-04

## 2017-12-08 NOTE — PROGRESS NOTES
Subjective   Kisha Mosher is a 62 y.o. female.     Chest Pain    This is a new problem. The current episode started more than 1 month ago. The onset quality is gradual. The problem occurs daily. The problem has been unchanged. The pain is present in the lateral region. The pain is mild. The quality of the pain is described as heavy, sharp and squeezing. The pain does not radiate. Associated symptoms comments: Fall in October at work. The pain is aggravated by breathing, exertion, coughing and lifting arm. She has tried acetaminophen for the symptoms. Prior diagnostic workup includes chest x-ray (Nuclear scan).       The following portions of the patient's history were reviewed and updated as appropriate: allergies, current medications, past family history, past medical history, past social history, past surgical history and problem list.    Review of Systems   Cardiovascular: Positive for chest pain.       Objective   Physical Exam   Constitutional: She is oriented to person, place, and time. She appears well-developed and well-nourished.   Neurological: She is alert and oriented to person, place, and time.   Psychiatric: She has a normal mood and affect. Her behavior is normal. Thought content normal.   Nursing note and vitals reviewed.      Assessment/Plan   Kisha was seen today for consult.    Diagnoses and all orders for this visit:    Closed fracture of multiple ribs of right side with delayed healing, subsequent encounter       Multiple rib fractures-continue rib belt and analgesics-no work until released by me

## 2017-12-26 ENCOUNTER — OFFICE VISIT (OUTPATIENT)
Dept: FAMILY MEDICINE CLINIC | Facility: CLINIC | Age: 62
End: 2017-12-26

## 2017-12-26 VITALS
OXYGEN SATURATION: 97 % | HEART RATE: 72 BPM | TEMPERATURE: 97.9 F | HEIGHT: 67 IN | SYSTOLIC BLOOD PRESSURE: 122 MMHG | BODY MASS INDEX: 29.7 KG/M2 | WEIGHT: 189.2 LBS | DIASTOLIC BLOOD PRESSURE: 80 MMHG

## 2017-12-26 DIAGNOSIS — S22.41XD CLOSED FRACTURE OF MULTIPLE RIBS OF RIGHT SIDE WITH ROUTINE HEALING, SUBSEQUENT ENCOUNTER: Primary | ICD-10-CM

## 2017-12-26 PROCEDURE — 99213 OFFICE O/P EST LOW 20 MIN: CPT | Performed by: FAMILY MEDICINE

## 2017-12-26 NOTE — PROGRESS NOTES
Subjective   Kisha Mosher is a 62 y.o. female.     Chest Pain    This is a new problem. The current episode started more than 1 month ago. The onset quality is sudden. The problem occurs daily. The problem has been gradually improving. The pain is present in the lateral region. The pain is at a severity of 3/10. The pain is mild. The quality of the pain is described as dull and heavy. The pain does not radiate. Pertinent negatives include no palpitations or shortness of breath. Associated symptoms comments: Fall as noted from previous notes-no direct rib cage injury but torsion injury on right rib cage that can fracture ribs i.e. Ribs can  be fractured from coughing.. The pain is aggravated by breathing and coughing. Treatments tried: Immobilization with rib belt and analgesics. The treatment provided moderate relief. Risk factors include post-menopausal.   Pertinent negatives for past medical history include no hypertension and no spontaneous pneumothorax.   Pertinent negatives for family medical history include: no PE. Prior diagnostic workup includes chest x-ray (Nuclear bone scan).       The following portions of the patient's history were reviewed and updated as appropriate: allergies, current medications, past family history, past medical history, past social history, past surgical history and problem list.    Review of Systems   Respiratory: Positive for chest tightness. Negative for shortness of breath.    Cardiovascular: Positive for chest pain. Negative for palpitations.       Objective   Physical Exam   Constitutional: She is oriented to person, place, and time. She appears well-developed and well-nourished.   Cardiovascular: Normal rate.    Pulmonary/Chest: Effort normal and breath sounds normal. She has no wheezes. She has no rales. She exhibits tenderness.   Presently, patient has rib belt on and is improving   Neurological: She is alert and oriented to person, place, and time.   Skin: Skin is warm  and dry.   Psychiatric: She has a normal mood and affect. Her behavior is normal. Thought content normal.   Nursing note and vitals reviewed.      Assessment/Plan   Kisha was seen today for follow-up.    Diagnoses and all orders for this visit:    Closed fracture of multiple ribs of right side with routine healing, subsequent encounter     Plan return 2 weeks-plan on returning to work end of January to first of February--healing appropriately

## 2018-01-22 ENCOUNTER — OFFICE VISIT (OUTPATIENT)
Dept: FAMILY MEDICINE CLINIC | Facility: CLINIC | Age: 63
End: 2018-01-22

## 2018-01-22 VITALS
HEIGHT: 67 IN | DIASTOLIC BLOOD PRESSURE: 64 MMHG | SYSTOLIC BLOOD PRESSURE: 118 MMHG | OXYGEN SATURATION: 97 % | HEART RATE: 69 BPM | TEMPERATURE: 97.9 F | BODY MASS INDEX: 30.1 KG/M2 | WEIGHT: 191.8 LBS

## 2018-01-22 DIAGNOSIS — S22.41XD CLOSED FRACTURE OF MULTIPLE RIBS OF RIGHT SIDE WITH ROUTINE HEALING, SUBSEQUENT ENCOUNTER: Primary | ICD-10-CM

## 2018-01-22 PROCEDURE — 99213 OFFICE O/P EST LOW 20 MIN: CPT | Performed by: FAMILY MEDICINE

## 2018-01-22 NOTE — PROGRESS NOTES
Subjective   Kisha Mosher is a 62 y.o. female.     Chest Pain    This is a new problem. The current episode started more than 1 month ago. The onset quality is sudden. The problem occurs daily. The problem has been resolved. The pain is present in the lateral region. The pain is moderate. The quality of the pain is described as sharp and stabbing. The pain does not radiate. Associated symptoms include exertional chest pressure. Pertinent negatives include no shortness of breath. The pain is aggravated by breathing. She has tried analgesics for the symptoms. The treatment provided moderate relief. Prior diagnostic workup includes chest x-ray (Nuclear bone scan showed fractures of fourth and fifth and 6 right ribs anteriorly).       The following portions of the patient's history were reviewed and updated as appropriate: allergies, current medications, past family history, past medical history, past social history, past surgical history and problem list.    Review of Systems   Respiratory: Negative for shortness of breath.         Review fractures fourth fifth and 6   Cardiovascular: Positive for chest pain.       Objective   Physical Exam   Constitutional: She is oriented to person, place, and time. She appears well-developed and well-nourished.   Cardiovascular: Normal rate and regular rhythm.    Pulmonary/Chest: Effort normal and breath sounds normal. She exhibits no tenderness.   Neurological: She is alert and oriented to person, place, and time.   Psychiatric: She has a normal mood and affect. Her behavior is normal. Thought content normal.   Nursing note and vitals reviewed.      Assessment/Plan   Kisha was seen today for consult.    Diagnoses and all orders for this visit:    Closed fracture of multiple ribs of right side with routine healing, subsequent encounter         Rib fractures healed-return to work the 24th-full duty

## 2018-02-26 ENCOUNTER — OFFICE VISIT (OUTPATIENT)
Dept: FAMILY MEDICINE CLINIC | Facility: CLINIC | Age: 63
End: 2018-02-26

## 2018-02-26 VITALS
BODY MASS INDEX: 30.04 KG/M2 | HEIGHT: 67 IN | SYSTOLIC BLOOD PRESSURE: 120 MMHG | WEIGHT: 191.4 LBS | DIASTOLIC BLOOD PRESSURE: 70 MMHG | HEART RATE: 72 BPM | OXYGEN SATURATION: 98 % | TEMPERATURE: 97.8 F

## 2018-02-26 DIAGNOSIS — IMO0001 WOUND ABSCESS, INITIAL ENCOUNTER: Primary | ICD-10-CM

## 2018-02-26 PROCEDURE — 99213 OFFICE O/P EST LOW 20 MIN: CPT | Performed by: FAMILY MEDICINE

## 2018-02-26 RX ORDER — SULFAMETHOXAZOLE AND TRIMETHOPRIM 800; 160 MG/1; MG/1
1 TABLET ORAL 2 TIMES DAILY
Qty: 20 TABLET | Refills: 0 | Status: SHIPPED | OUTPATIENT
Start: 2018-02-26 | End: 2018-03-08

## 2018-02-26 NOTE — PROGRESS NOTES
Subjective   Kisha Mosher is a 62 y.o. female.     Insect Bite   This is a new problem. The current episode started 1 to 4 weeks ago. The problem occurs daily. The problem has been gradually improving. Associated symptoms comments: Cellulitis right forearm-treat with Augmentin by another provider. Nothing aggravates the symptoms. The treatment provided mild relief.       The following portions of the patient's history were reviewed and updated as appropriate: allergies, current medications, past family history, past medical history, past social history, past surgical history and problem list.    Review of Systems   Respiratory:        Rib fractures healed   Musculoskeletal:        Minimal cellulitis and eschar still present-not completely well       Objective   Physical Exam   Constitutional: She is oriented to person, place, and time. She appears well-developed and well-nourished.   Neurological: She is alert and oriented to person, place, and time.   Skin:   Cellulitis and eschar right elbow-suspicious for continuing cellulitis   Psychiatric: She has a normal mood and affect. Her behavior is normal. Thought content normal.   Nursing note and vitals reviewed.      Assessment/Plan   Kisha was seen today for insect bite.    Diagnoses and all orders for this visit:    Wound abscess, initial encounter    Other orders  -     sulfamethoxazole-trimethoprim (BACTRIM DS) 800-160 MG per tablet; Take 1 tablet by mouth 2 (Two) Times a Day for 10 days.         Heat and Bactrim

## 2018-04-18 RX ORDER — FLUTICASONE PROPIONATE 50 MCG
1 SPRAY, SUSPENSION (ML) NASAL 2 TIMES DAILY
Qty: 16 ML | Refills: 3 | Status: SHIPPED | OUTPATIENT
Start: 2018-04-18 | End: 2018-08-30 | Stop reason: SDUPTHER

## 2018-04-20 ENCOUNTER — TELEPHONE (OUTPATIENT)
Dept: FAMILY MEDICINE CLINIC | Facility: CLINIC | Age: 63
End: 2018-04-20

## 2018-04-20 NOTE — TELEPHONE ENCOUNTER
Patient is going on cruise next week.  Would like patch for motion sickness       they will mess up her vision-get Dramamine over-the-counter works just as good

## 2018-08-31 RX ORDER — FLUTICASONE PROPIONATE 50 MCG
1 SPRAY, SUSPENSION (ML) NASAL 2 TIMES DAILY
Qty: 16 ML | Refills: 3 | Status: SHIPPED | OUTPATIENT
Start: 2018-08-31 | End: 2019-01-09 | Stop reason: SDUPTHER

## 2018-12-04 ENCOUNTER — OFFICE VISIT (OUTPATIENT)
Dept: FAMILY MEDICINE CLINIC | Facility: CLINIC | Age: 63
End: 2018-12-04

## 2018-12-04 VITALS
OXYGEN SATURATION: 98 % | TEMPERATURE: 98.1 F | HEART RATE: 81 BPM | BODY MASS INDEX: 29.35 KG/M2 | WEIGHT: 187 LBS | SYSTOLIC BLOOD PRESSURE: 129 MMHG | HEIGHT: 67 IN | DIASTOLIC BLOOD PRESSURE: 76 MMHG

## 2018-12-04 DIAGNOSIS — Z23 NEED FOR PROPHYLACTIC VACCINATION AND INOCULATION AGAINST INFLUENZA: Primary | ICD-10-CM

## 2018-12-04 DIAGNOSIS — N84.0 ENDOMETRIAL POLYP: ICD-10-CM

## 2018-12-04 DIAGNOSIS — Z12.12 SCREENING FOR COLORECTAL CANCER: ICD-10-CM

## 2018-12-04 DIAGNOSIS — Z12.4 ROUTINE CERVICAL SMEAR: ICD-10-CM

## 2018-12-04 DIAGNOSIS — Z12.39 SCREENING FOR MALIGNANT NEOPLASM OF BREAST: ICD-10-CM

## 2018-12-04 DIAGNOSIS — I10 ESSENTIAL HYPERTENSION: ICD-10-CM

## 2018-12-04 DIAGNOSIS — Z12.11 SCREENING FOR COLORECTAL CANCER: ICD-10-CM

## 2018-12-04 LAB
BILIRUB BLD-MCNC: NEGATIVE MG/DL
CLARITY, POC: CLEAR
COLOR UR: YELLOW
GLUCOSE UR STRIP-MCNC: NEGATIVE MG/DL
KETONES UR QL: NEGATIVE
LEUKOCYTE EST, POC: ABNORMAL
NITRITE UR-MCNC: NEGATIVE MG/ML
PH UR: 5.5 [PH] (ref 5–8)
PROT UR STRIP-MCNC: NEGATIVE MG/DL
RBC # UR STRIP: ABNORMAL /UL
SP GR UR: 1.02 (ref 1–1.03)
UROBILINOGEN UR QL: NORMAL

## 2018-12-04 PROCEDURE — 99396 PREV VISIT EST AGE 40-64: CPT | Performed by: FAMILY MEDICINE

## 2018-12-04 PROCEDURE — 81003 URINALYSIS AUTO W/O SCOPE: CPT | Performed by: FAMILY MEDICINE

## 2018-12-04 RX ORDER — CETIRIZINE HYDROCHLORIDE 10 MG/1
10 TABLET ORAL DAILY
COMMUNITY
End: 2020-07-13

## 2018-12-04 NOTE — PROGRESS NOTES
Subjective   Kisha Mosher is a 63 y.o. female.     Hypertension   This is a chronic problem. The current episode started more than 1 year ago. The problem is unchanged. The problem is controlled. Pertinent negatives include no chest pain. There are no associated agents to hypertension. Risk factors for coronary artery disease include post-menopausal state, sedentary lifestyle, dyslipidemia and family history. Past treatments include diuretics. Current antihypertension treatment includes diuretics and lifestyle changes. The current treatment provides mild improvement. Compliance problems include diet and exercise.         The following portions of the patient's history were reviewed and updated as appropriate: allergies, current medications, past family history, past medical history, past social history, past surgical history and problem list.    Review of Systems   Cardiovascular: Negative for chest pain and leg swelling.   Genitourinary:        Occasional vaginal bleeding   Musculoskeletal:        Surgical past right knee replacement   All other systems reviewed and are negative.      Objective   Physical Exam   Constitutional: She is oriented to person, place, and time.   Overweight   HENT:   Right Ear: External ear normal.   Left Ear: External ear normal.   Mouth/Throat: Oropharynx is clear and moist.   Eyes: EOM are normal. Pupils are equal, round, and reactive to light.   Neck: No thyromegaly present.   Good carotid pulses   Cardiovascular: Normal rate and regular rhythm.   Pulmonary/Chest: Effort normal and breath sounds normal.   Breasts no masses noted   Abdominal: Soft. Bowel sounds are normal.   Genitourinary:   Genitourinary Comments: Cervix present Pap smear taken large smooth endometrial polyp exiting from cervix-uterus slight enlargement no definite adnexal masses noted   Musculoskeletal: She exhibits no edema.   Lymphadenopathy:     She has no cervical adenopathy.   Neurological: She is alert and  oriented to person, place, and time.   Skin: Skin is warm and dry. Capillary refill takes less than 2 seconds.   Psychiatric: She has a normal mood and affect. Her behavior is normal. Judgment and thought content normal.   Nursing note and vitals reviewed.      Assessment/Plan   Problems Addressed this Visit     None      Visit Diagnoses     Need for prophylactic vaccination and inoculation against influenza    -  Primary    Screening for colorectal cancer        Relevant Orders    Cologuard - Stool, Per Rectum    Essential hypertension        Relevant Orders    POC Urinalysis Dipstick, Automated (Completed)    Screening for malignant neoplasm of breast        Relevant Orders    Mammo Screening Bilateral With CAD    Routine cervical smear        Relevant Orders    Pap IG (Image Guided)    Endometrial polyp        Relevant Orders    Ambulatory Referral to Obstetrics / Gynecology            Plan-Mediterranean diet advised statin medication-GYN consult for endometrial polyp Pap smear pending

## 2018-12-04 NOTE — PATIENT INSTRUCTIONS
Mediterranean Diet  A Mediterranean diet refers to food and lifestyle choices that are based on the traditions of countries located on the [place]. This way of eating has been shown to help prevent certain conditions and improve outcomes for people who have chronic diseases, like kidney disease and heart disease.  What are tips for following this plan?  Lifestyle  · Cook and eat meals together with your family, when possible.  · Drink enough fluid to keep your urine clear or pale yellow.  · Be physically active every day. This includes:  ? Aerobic exercise like running or swimming.  ? Leisure activities like gardening, walking, or housework.  · Get 7-8 hours of sleep each night.  · If recommended by your health care provider, drink red wine in moderation. This means 1 glass a day for nonpregnant women and 2 glasses a day for men. A glass of wine equals 5 oz (150 mL).  Reading food labels  · Check the serving size of packaged foods. For foods such as rice and pasta, the serving size refers to the amount of cooked product, not dry.  · Check the total fat in packaged foods. Avoid foods that have saturated fat or trans fats.  · Check the ingredients list for added sugars, such as corn syrup.  Shopping  · At the grocery store, buy most of your food from the areas near the walls of the store. This includes:  ? Fresh fruits and vegetables (produce).  ? Grains, beans, nuts, and seeds. Some of these may be available in unpackaged forms or large amounts (in bulk).  ? Fresh seafood.  ? Poultry and eggs.  ? Low-fat dairy products.  · Buy whole ingredients instead of prepackaged foods.  · Buy fresh fruits and vegetables in-season from local farmers markets.  · Buy frozen fruits and vegetables in resealable bags.  · If you do not have access to quality fresh seafood, buy precooked frozen shrimp or canned fish, such as tuna, salmon, or sardines.  · Buy small amounts of raw or cooked vegetables, salads, or olives from the  deli or salad bar at your store.  · Stock your pantry so you always have certain foods on hand, such as olive oil, canned tuna, canned tomatoes, rice, pasta, and beans.  Cooking  · Cook foods with extra-virgin olive oil instead of using butter or other vegetable oils.  · Have meat as a side dish, and have vegetables or grains as your main dish. This means having meat in small portions or adding small amounts of meat to foods like pasta or stew.  · Use beans or vegetables instead of meat in common dishes like chili or lasagna.  · Mascot with different cooking methods. Try roasting or broiling vegetables instead of steaming or sautéeing them.  · Add frozen vegetables to soups, stews, pasta, or rice.  · Add nuts or seeds for added healthy fat at each meal. You can add these to yogurt, salads, or vegetable dishes.  · Marinate fish or vegetables using olive oil, lemon juice, garlic, and fresh herbs.  Meal planning  · Plan to eat 1 vegetarian meal one day each week. Try to work up to 2 vegetarian meals, if possible.  · Eat seafood 2 or more times a week.  · Have healthy snacks readily available, such as:  ? Vegetable sticks with hummus.  ? Greek yogurt.  ? Fruit and nut trail mix.  · Eat balanced meals throughout the week. This includes:  ? Fruit: 2-3 servings a day  ? Vegetables: 4-5 servings a day  ? Low-fat dairy: 2 servings a day  ? Fish, poultry, or lean meat: 1 serving a day  ? Beans and legumes: 2 or more servings a week  ? Nuts and seeds: 1-2 servings a day  ? Whole grains: 6-8 servings a day  ? Extra-virgin olive oil: 3-4 servings a day  · Limit red meat and sweets to only a few servings a month  What are my food choices?  · Mediterranean diet  ? Recommended  ? Grains: Whole-grain pasta. Brown rice. Bulgar wheat. Polenta. Couscous. Whole-wheat bread. Oatmeal. Quinoa.  ? Vegetables: Artichokes. Beets. Broccoli. Cabbage. Carrots. Eggplant. Green beans. Chard. Kale. Spinach. Onions. Leeks. Peas. Squash.  Tomatoes. Peppers. Radishes.  ? Fruits: Apples. Apricots. Avocado. Berries. Bananas. Cherries. Dates. Figs. Grapes. Julio. Melon. Oranges. Peaches. Plums. Pomegranate.  ? Meats and other protein foods: Beans. Almonds. Sunflower seeds. Pine nuts. Peanuts. Cod. [place]. Scallops. Shrimp. Tuna. Tilapia. Clams. Oysters. Eggs.  ? Dairy: Low-fat milk. Cheese. Greek yogurt.  ? Beverages: Water. Red wine. Herbal tea.  ? Fats and oils: Extra virgin olive oil. Avocado oil. Grape seed oil.  ? Sweets and desserts: Greek yogurt with honey. Baked apples. Poached pears. Trail mix.  ? Seasoning and other foods: Basil. Cilantro. Coriander. Cumin. Mint. Parsley. [place]. Rosemary. Tarragon. Garlic. Oregano. Thyme. Pepper. Balsalmic vinegar. Tahini. Hummus. Tomato sauce. Olives. Mushrooms.  ? Limit these  ? Grains: Prepackaged pasta or rice dishes. Prepackaged cereal with added sugar.  ? Vegetables: Deep fried potatoes (french fries).  ? Fruits: Fruit canned in syrup.  ? Meats and other protein foods: Beef. Pork. Lamb. Poultry with skin. Hot dogs. Parisi.  ? Dairy: Ice cream. Sour cream. Whole milk.  ? Beverages: Juice. Sugar-sweetened soft drinks. Beer. Liquor and spirits.  ? Fats and oils: Butter. Canola oil. Vegetable oil. Beef fat (tallow). Lard.  ? Sweets and desserts: Cookies. Cakes. Pies. Candy.  ? Seasoning and other foods: Mayonnaise. Premade sauces and marinades.  ? The items listed may not be a complete list. Talk with your dietitian about what dietary choices are right for you.  Summary  · The Mediterranean diet includes both food and lifestyle choices.  · Eat a variety of fresh fruits and vegetables, beans, nuts, seeds, and whole grains.  · Limit the amount of red meat and sweets that you eat.  · Talk with your health care provider about whether it is safe for you to drink red wine in moderation. This means 1 glass a day for nonpregnant women and 2 glasses a day for men. A glass of wine equals 5 oz (150 mL).  This information  is not intended to replace advice given to you by your health care provider. Make sure you discuss any questions you have with your health care provider.  Document Released: 08/10/2017 Document Revised: 09/12/2017 Document Reviewed: 08/10/2017  [employer] Interactive Patient Education © 2018 [employer]

## 2018-12-06 LAB
CYTOLOGIST CVX/VAG CYTO: NORMAL
CYTOLOGY CVX/VAG DOC THIN PREP: NORMAL
DX ICD CODE: NORMAL
HIV 1 & 2 AB SER-IMP: NORMAL
Lab: NORMAL
OTHER STN SPEC: NORMAL
PATH REPORT.FINAL DX SPEC: NORMAL
STAT OF ADQ CVX/VAG CYTO-IMP: NORMAL

## 2018-12-26 DIAGNOSIS — R19.5 POSITIVE COLORECTAL CANCER SCREENING USING COLOGUARD TEST: Primary | ICD-10-CM

## 2019-01-09 RX ORDER — FLUTICASONE PROPIONATE 50 MCG
1 SPRAY, SUSPENSION (ML) NASAL 2 TIMES DAILY
Qty: 16 ML | Refills: 3 | Status: SHIPPED | OUTPATIENT
Start: 2019-01-09 | End: 2019-06-28 | Stop reason: SDUPTHER

## 2019-01-10 ENCOUNTER — PROCEDURE VISIT (OUTPATIENT)
Dept: OBSTETRICS AND GYNECOLOGY | Facility: CLINIC | Age: 64
End: 2019-01-10

## 2019-01-10 ENCOUNTER — OFFICE VISIT (OUTPATIENT)
Dept: OBSTETRICS AND GYNECOLOGY | Facility: CLINIC | Age: 64
End: 2019-01-10

## 2019-01-10 VITALS
SYSTOLIC BLOOD PRESSURE: 120 MMHG | HEIGHT: 67 IN | WEIGHT: 185 LBS | BODY MASS INDEX: 29.03 KG/M2 | DIASTOLIC BLOOD PRESSURE: 80 MMHG

## 2019-01-10 DIAGNOSIS — N84.1 CERVICAL POLYP: ICD-10-CM

## 2019-01-10 DIAGNOSIS — N95.0 PMB (POSTMENOPAUSAL BLEEDING): Primary | ICD-10-CM

## 2019-01-10 DIAGNOSIS — R93.89 ENDOMETRIAL THICKENING ON ULTRASOUND: ICD-10-CM

## 2019-01-10 DIAGNOSIS — N88.8 CERVICAL MASS: ICD-10-CM

## 2019-01-10 DIAGNOSIS — N95.0 POSTMENOPAUSAL BLEEDING: Primary | ICD-10-CM

## 2019-01-10 PROCEDURE — 99203 OFFICE O/P NEW LOW 30 MIN: CPT | Performed by: OBSTETRICS & GYNECOLOGY

## 2019-01-10 PROCEDURE — 76830 TRANSVAGINAL US NON-OB: CPT | Performed by: OBSTETRICS & GYNECOLOGY

## 2019-01-10 NOTE — PROGRESS NOTES
"Kisha Mosher is a 63 y.o. female here today for evaluation of a cervical mass.  She underwent annual examination with her PCP, who noted a mass protruding through the cervix.  A Pap smear from that visit was normal.  She reports a history of uterine fibroids and I have been able to review an ultrasound report from  showing a 5 cm uterine fibroid.  She is para 5 having had 4 vaginal deliveries and 1 .  She is not on hormone replacement therapy.  She does describe some off and on light vaginal bleeding over the last year.  There are no aggravating or alleviating factors.  She denies abdominal pain, or vaginal discharge.    Social History     Tobacco Use   • Smoking status: Never Smoker   • Smokeless tobacco: Never Used   Substance Use Topics   • Alcohol use: No   • Drug use: No      Review of systems: She denies unexpected changes in her weight, or easy bruising    Visit Vitals  /80   Ht 170.2 cm (67.01\")   Wt 83.9 kg (185 lb)   Breastfeeding? No   BMI 28.97 kg/m²     Pleasant female no acute distress  Mood and affect normal  Skin warm and dry  Regular heart rate  Breathing unlabored  Abdomen soft and nontender  Normal external genitalia, BUS normal, labia without tenderness  The vaginal mucosa is mildly atrophic but without lesions, blood, or discharge.  The cervix contains a 3 cm prolapsing mass that is mildly friable and firm.  The surface of the cervix itself appears normal.  The uterus is not enlarged or tender.  There are no adnexal masses or tenderness.    A pelvic ultrasound was ordered and performed in the office today.  The uterus contains at least 4 small fibroids, less than or equal to 3.8 cm.  The endometrium measures 14 mm.  There are no ovarian abnormalities noted.    Assessment: Postmenopausal bleeding, thickened endometrium, cervical mass    I suspect that this mass is a prolapsing fibroid.  Her endometrium is also thickened and needs evaluation.  I have recommended hysteroscopy " with D&C and removal of the cervical mass.  We discussed the planned surgical procedure including risks of bleeding, infection, uterine perforation, and damage to surrounding structures.  All of her questions were answered and she desires to proceed.  Preoperative orders have been placed.  She is scheduled for surgery on January 30.

## 2019-01-14 RX ORDER — SODIUM CHLORIDE 9 MG/ML
100 INJECTION, SOLUTION INTRAVENOUS CONTINUOUS
Status: CANCELLED | OUTPATIENT
Start: 2019-01-14

## 2019-01-14 NOTE — H&P
" [place]  Kisha Mosher  : 1955  MRN: [id number]  CSN: [id number]    History and Physical    Subjective   Kisha Mosher is a 63 y.o. year old who presents for consultation about surgery due to a prolapsing cervical mass.  She also has several small fibroids on ultrasound, as well as a 14 mm endometrium.    Past Medical History:   Diagnosis Date   • Hyperlipidemia      Past Surgical History:   Procedure Laterality Date   •  SECTION     • COLONOSCOPY  2016    [facility] [place]   • TOTAL KNEE ARTHROPLASTY Right 2017     Social History    Tobacco Use      Smoking status: Never Smoker      Smokeless tobacco: Never Used      Current Outpatient Medications:   •  cetirizine (zyrTEC) 10 MG tablet, Take 10 mg by mouth Daily., Disp: , Rfl:   •  fluticasone (FLONASE) 50 MCG/ACT nasal spray, 1 SPRAY BY EACH NARE ROUTE 2 (TWO) TIMES A DAY., Disp: 16 mL, Rfl: 3  •  indapamide (LOZOL) 1.25 MG tablet, Take 1 tablet by mouth Daily., Disp: 90 tablet, Rfl: 0    No Known Allergies    Family History   Problem Relation Age of Onset   • Breast cancer Sister    • Alzheimer's disease Mother    • Hypertension Mother    • No Known Problems Father    • No Known Problems Daughter    • No Known Problems Son    • No Known Problems Maternal Grandmother    • No Known Problems Maternal Grandfather    • No Known Problems Paternal Grandmother    • No Known Problems Paternal Grandfather    • Breast cancer Sister    • No Known Problems Daughter    • No Known Problems Son    • No Known Problems Son      Review of Systems   Constitutional: Negative for unexpected weight change.   HENT: Negative for trouble swallowing.    Respiratory: Negative for shortness of breath.    Cardiovascular: Negative for chest pain.   Musculoskeletal: Negative for neck stiffness.   Neurological: Negative for seizures.   Hematological: Does not bruise/bleed easily.         Objective   /80   Ht 170.2 cm (67.01\")   Wt 83.9 kg (185 lb)   " Breastfeeding? No   BMI 28.97 kg/m²     Physical Exam   Physical Exam   Constitutional: She is oriented to person, place, and time. She appears well-developed and well-nourished.   HENT:   Head: Normocephalic and atraumatic.   Eyes: No scleral icterus.   Neck: No tracheal deviation present.   Cardiovascular: Normal rate and regular rhythm.   Pulmonary/Chest: Effort normal and breath sounds normal.   Abdominal: Soft. Bowel sounds are normal. She exhibits no distension. There is no tenderness.   Genitourinary: Uterus normal. Rectal exam shows no external hemorrhoid. Pelvic exam was performed with patient supine. There is no lesion on the right labia. There is no lesion on the left labia. Uterus is not enlarged, not fixed and not tender. Cervix exhibits no motion tenderness. Right adnexum displays no mass. Left adnexum displays no mass. No bleeding in the vagina. No vaginal discharge found.   Lymphadenopathy:        Right: No inguinal adenopathy present.        Left: No inguinal adenopathy present.   Neurological: She is alert and oriented to person, place, and time.   Skin: Skin is warm and dry.   Vitals reviewed.      Labs  Lab Results   Component Value Date     11/29/2018    HGB 15.3 11/29/2018    HCT 47.4 (H) 11/29/2018    WBC 5.38 11/29/2018     (H) 11/29/2018    K 4.7 11/29/2018     11/29/2018    CO2 24.0 11/29/2018    BUN 13 11/29/2018    CREATININE 0.72 11/29/2018    ALBUMIN 4.80 11/29/2018    CALCIUM 10.2 11/29/2018    AST 39 11/29/2018    ALT 42 11/29/2018    BILITOT 0.5 11/29/2018        Assessment & Plan    Kisha was seen today for transitional care management.    Diagnoses and all orders for this visit:    Postmenopausal bleeding  -     Case Request    Cervical mass  -     Case Request    Endometrial thickening on ultrasound    Risks, benefits, and alternatives of a D&C with hysteroscopy were discussed with the patient in detail. Intraoperative risks of bleeding and damage to  surrounding organs, including but not limited to intestine, bladder and ureter, were explained. Management of these were also explained. Postoperative complications such as infection, pneumonia, DVT, and bleeding were explained. The importance of compliance with postoperative restrictions was discussed. The diagnostic nature of the procedure was explained.    Uterine perforation was discussed with the patient at length.     All of the patient's questions were answered to her satisfaction. She was encouraged to return for an additional appointment if she had further questions. She verbalized understanding of the above and wished to proceed with the outlined plan.       Dinh Syed MD  1/14/2019  7:32 AM

## 2019-01-19 ENCOUNTER — RESULTS ENCOUNTER (OUTPATIENT)
Dept: OBSTETRICS AND GYNECOLOGY | Facility: CLINIC | Age: 64
End: 2019-01-19

## 2019-01-19 DIAGNOSIS — N95.0 POSTMENOPAUSAL BLEEDING: ICD-10-CM

## 2019-01-19 DIAGNOSIS — N88.8 CERVICAL MASS: ICD-10-CM

## 2019-01-19 DIAGNOSIS — R93.89 ENDOMETRIAL THICKENING ON ULTRASOUND: ICD-10-CM

## 2019-01-24 ENCOUNTER — TELEPHONE (OUTPATIENT)
Dept: OBSTETRICS AND GYNECOLOGY | Facility: CLINIC | Age: 64
End: 2019-01-24

## 2019-01-24 NOTE — TELEPHONE ENCOUNTER
"Pt called to cancel her D&C hysteroscopy with myosure on 2/27/19 due to \"personal and medical issues\" she is dealing with at the moment. Pt states she is having a colonoscopy around the same time to \"follow up on a problem I'm having\". She will call back to reschedule in the future.   "

## 2019-02-27 PROBLEM — Z86.010 HX OF ADENOMATOUS COLONIC POLYPS: Status: ACTIVE | Noted: 2019-02-27

## 2019-02-27 PROBLEM — Z86.0101 HX OF ADENOMATOUS COLONIC POLYPS: Status: ACTIVE | Noted: 2019-02-27

## 2019-06-18 ENCOUNTER — OFFICE VISIT (OUTPATIENT)
Dept: FAMILY MEDICINE CLINIC | Facility: CLINIC | Age: 64
End: 2019-06-18

## 2019-06-18 VITALS
WEIGHT: 184.8 LBS | TEMPERATURE: 98.9 F | HEART RATE: 85 BPM | SYSTOLIC BLOOD PRESSURE: 145 MMHG | BODY MASS INDEX: 29 KG/M2 | OXYGEN SATURATION: 98 % | HEIGHT: 67 IN | DIASTOLIC BLOOD PRESSURE: 92 MMHG

## 2019-06-18 DIAGNOSIS — R07.2 PRECORDIAL PAIN: ICD-10-CM

## 2019-06-18 DIAGNOSIS — I10 HYPERTENSION, UNSPECIFIED TYPE: Primary | ICD-10-CM

## 2019-06-18 PROCEDURE — 96372 THER/PROPH/DIAG INJ SC/IM: CPT | Performed by: FAMILY MEDICINE

## 2019-06-18 PROCEDURE — 99214 OFFICE O/P EST MOD 30 MIN: CPT | Performed by: FAMILY MEDICINE

## 2019-06-18 PROCEDURE — 93000 ELECTROCARDIOGRAM COMPLETE: CPT | Performed by: FAMILY MEDICINE

## 2019-06-18 RX ORDER — LISINOPRIL 5 MG/1
TABLET ORAL
Qty: 90 TABLET | Refills: 3 | Status: SHIPPED | OUTPATIENT
Start: 2019-06-18 | End: 2020-05-07

## 2019-06-18 RX ORDER — PREDNISONE 10 MG/1
TABLET ORAL
Qty: 21 TABLET | Refills: 0 | Status: SHIPPED | OUTPATIENT
Start: 2019-06-18 | End: 2019-12-26

## 2019-06-18 RX ORDER — METHYLPREDNISOLONE ACETATE 40 MG/ML
40 INJECTION, SUSPENSION INTRA-ARTICULAR; INTRALESIONAL; INTRAMUSCULAR; SOFT TISSUE ONCE
Status: COMPLETED | OUTPATIENT
Start: 2019-06-18 | End: 2019-06-18

## 2019-06-18 RX ADMIN — METHYLPREDNISOLONE ACETATE 40 MG: 40 INJECTION, SUSPENSION INTRA-ARTICULAR; INTRALESIONAL; INTRAMUSCULAR; SOFT TISSUE at 16:03

## 2019-06-18 NOTE — PROGRESS NOTES
Subjective   Kisha Mosher is a 64 y.o. female.     64-year-old female with hypertension and back pain      Hypertension   This is a chronic problem. The current episode started more than 1 year ago. The problem has been waxing and waning since onset. The problem is uncontrolled. Associated symptoms include chest pain and neck pain. Pertinent negatives include no shortness of breath. There are no associated agents to hypertension. Risk factors for coronary artery disease include sedentary lifestyle, stress, post-menopausal state and family history. Past treatments include diuretics. Current antihypertension treatment includes diuretics. The current treatment provides mild improvement. Compliance problems include diet and exercise.        The following portions of the patient's history were reviewed and updated as appropriate: allergies, current medications, past family history, past medical history, past social history, past surgical history and problem list.    Review of Systems   Respiratory: Negative for shortness of breath.    Cardiovascular: Positive for chest pain. Negative for leg swelling.        Probably musculoskeletal   Musculoskeletal: Positive for arthralgias and neck pain.   Neurological:        Cervical radiculopathy       Objective   Physical Exam   Constitutional: She is oriented to person, place, and time.   Overweight   Cardiovascular: Normal rate and regular rhythm.   Pulmonary/Chest: Effort normal and breath sounds normal.   Musculoskeletal: She exhibits no tenderness.   Lymphadenopathy:     She has no cervical adenopathy.   Neurological: She is alert and oriented to person, place, and time.   Psychiatric: She has a normal mood and affect. Her behavior is normal. Judgment and thought content normal.   Nursing note and vitals reviewed.      Assessment/Plan   Patient Active Problem List   Diagnosis   • Mixed hyperlipidemia   • Postmenopausal bleeding   • Cervical mass   • Hx of adenomatous  colonic polyps     Kisha was seen today for back pain and hypertension.    Diagnoses and all orders for this visit:    Hypertension, unspecified type    Precordial pain  -     CBC & Differential  -     Basic Metabolic Panel  -     ECG 12 Lead  -     Troponin I  -     methylPREDNISolone acetate (DEPO-medrol) injection 40 mg    Other orders  -     predniSONE (DELTASONE) 10 MG (21) tablet pack; Use as directed on package-start Wednesday morning  -     lisinopril (PRINIVIL,ZESTRIL) 5 MG tablet; Take 1 at bedtime for blood pressure       Return if symptoms worsen or fail to improve, for Next scheduled follow up.    Plan-above

## 2019-06-19 LAB
BASOPHILS # BLD AUTO: 0 X10E3/UL (ref 0–0.2)
BASOPHILS NFR BLD AUTO: 0 %
BUN SERPL-MCNC: 14 MG/DL (ref 8–27)
BUN/CREAT SERPL: 17 (ref 12–28)
CALCIUM SERPL-MCNC: 9.9 MG/DL (ref 8.7–10.3)
CHLORIDE SERPL-SCNC: 101 MMOL/L (ref 96–106)
CO2 SERPL-SCNC: 25 MMOL/L (ref 20–29)
CREAT SERPL-MCNC: 0.84 MG/DL (ref 0.57–1)
EOSINOPHIL # BLD AUTO: 0.1 X10E3/UL (ref 0–0.4)
EOSINOPHIL NFR BLD AUTO: 1 %
ERYTHROCYTE [DISTWIDTH] IN BLOOD BY AUTOMATED COUNT: 13.3 % (ref 12.3–15.4)
GLUCOSE SERPL-MCNC: 96 MG/DL (ref 65–99)
HCT VFR BLD AUTO: 45.5 % (ref 34–46.6)
HGB BLD-MCNC: 15.5 G/DL (ref 11.1–15.9)
IMM GRANULOCYTES # BLD AUTO: 0 X10E3/UL (ref 0–0.1)
IMM GRANULOCYTES NFR BLD AUTO: 0 %
LYMPHOCYTES # BLD AUTO: 2.3 X10E3/UL (ref 0.7–3.1)
LYMPHOCYTES NFR BLD AUTO: 35 %
MCH RBC QN AUTO: 29.4 PG (ref 26.6–33)
MCHC RBC AUTO-ENTMCNC: 34.1 G/DL (ref 31.5–35.7)
MCV RBC AUTO: 86 FL (ref 79–97)
MONOCYTES # BLD AUTO: 0.4 X10E3/UL (ref 0.1–0.9)
MONOCYTES NFR BLD AUTO: 6 %
NEUTROPHILS # BLD AUTO: 3.8 X10E3/UL (ref 1.4–7)
NEUTROPHILS NFR BLD AUTO: 58 %
PLATELET # BLD AUTO: 229 X10E3/UL (ref 150–450)
POTASSIUM SERPL-SCNC: 4.1 MMOL/L (ref 3.5–5.2)
RBC # BLD AUTO: 5.28 X10E6/UL (ref 3.77–5.28)
SODIUM SERPL-SCNC: 142 MMOL/L (ref 134–144)
TROPONIN I SERPL-MCNC: <0.01 NG/ML (ref 0–0.04)
WBC # BLD AUTO: 6.6 X10E3/UL (ref 3.4–10.8)

## 2019-06-28 RX ORDER — FLUTICASONE PROPIONATE 50 MCG
1 SPRAY, SUSPENSION (ML) NASAL 2 TIMES DAILY
Qty: 16 ML | Refills: 3 | Status: SHIPPED | OUTPATIENT
Start: 2019-06-28 | End: 2020-02-06 | Stop reason: SDDI

## 2019-09-16 RX ORDER — INDAPAMIDE 1.25 MG/1
1.25 TABLET, FILM COATED ORAL DAILY
Qty: 90 TABLET | Refills: 0 | OUTPATIENT
Start: 2019-09-16

## 2019-12-26 ENCOUNTER — OFFICE VISIT (OUTPATIENT)
Dept: FAMILY MEDICINE CLINIC | Facility: CLINIC | Age: 64
End: 2019-12-26

## 2019-12-26 VITALS
BODY MASS INDEX: 29.98 KG/M2 | DIASTOLIC BLOOD PRESSURE: 77 MMHG | HEIGHT: 67 IN | WEIGHT: 191 LBS | SYSTOLIC BLOOD PRESSURE: 112 MMHG | TEMPERATURE: 98 F | OXYGEN SATURATION: 98 % | HEART RATE: 88 BPM

## 2019-12-26 DIAGNOSIS — Z12.4 CERVICAL CANCER SCREENING: ICD-10-CM

## 2019-12-26 DIAGNOSIS — N88.8 CERVICAL MASS: ICD-10-CM

## 2019-12-26 DIAGNOSIS — Z12.39 BREAST CANCER SCREENING: ICD-10-CM

## 2019-12-26 DIAGNOSIS — R09.82 POSTNASAL DRIP: ICD-10-CM

## 2019-12-26 DIAGNOSIS — E78.2 MIXED HYPERLIPIDEMIA: ICD-10-CM

## 2019-12-26 DIAGNOSIS — I10 ESSENTIAL HYPERTENSION: ICD-10-CM

## 2019-12-26 DIAGNOSIS — N84.2 VAGINAL POLYP: ICD-10-CM

## 2019-12-26 DIAGNOSIS — Z00.00 ANNUAL PHYSICAL EXAM: Primary | ICD-10-CM

## 2019-12-26 LAB
BILIRUB BLD-MCNC: NEGATIVE MG/DL
CLARITY, POC: CLEAR
COLOR UR: YELLOW
GLUCOSE UR STRIP-MCNC: NEGATIVE MG/DL
KETONES UR QL: NEGATIVE
LEUKOCYTE EST, POC: NEGATIVE
NITRITE UR-MCNC: NEGATIVE MG/ML
PH UR: 5.5 [PH] (ref 5–8)
PROT UR STRIP-MCNC: NEGATIVE MG/DL
RBC # UR STRIP: ABNORMAL /UL
SP GR UR: 1.03 (ref 1–1.03)
UROBILINOGEN UR QL: NORMAL

## 2019-12-26 PROCEDURE — 81003 URINALYSIS AUTO W/O SCOPE: CPT | Performed by: NURSE PRACTITIONER

## 2019-12-26 PROCEDURE — 99396 PREV VISIT EST AGE 40-64: CPT | Performed by: NURSE PRACTITIONER

## 2019-12-26 RX ORDER — AZELASTINE 1 MG/ML
2 SPRAY, METERED NASAL 2 TIMES DAILY
Qty: 1 EACH | Refills: 2 | Status: SHIPPED | OUTPATIENT
Start: 2019-12-26 | End: 2020-03-18

## 2019-12-26 NOTE — PROGRESS NOTES
CC: annual physical     History:  Kisha Mosher is a 64 y.o. female who presents today for evaluation of the above problems.    Patient is here for her annual physical.  At her physical last year she was found to have a positive Cologuard test.  Subsequently had a colonoscopy which did show adenomatous colon polyps and she was given a 2-year recall on this.  Also at her previous physical she was found to have endometrial polyp.  Dr. Syed did want to do a D&C with removal of this however she never did have this done.  She denies any vaginal bleeding or pain.  Persistent postnasal drip and cough with clear sputum for multiple years.  Has been on multiple oral antihistamines. Started lisinopril in , but cough preceded this and is directly associated with drainage.    States that she has already had a flu shot this year.    HPI  ROS:  Review of Systems   Respiratory: Positive for cough. Negative for shortness of breath.    Cardiovascular: Negative for chest pain.   Gastrointestinal: Negative for constipation, diarrhea, nausea and vomiting.   Genitourinary: Negative for vaginal bleeding and vaginal pain.   Neurological: Negative for dizziness, light-headedness and headaches.       No Known Allergies  Past Medical History:   Diagnosis Date   • Essential hypertension 2019   • Hyperlipidemia      Past Surgical History:   Procedure Laterality Date   •  SECTION     • COLONOSCOPY  2016    [facility] [place] DR [place]UJ[place]   • TOTAL KNEE ARTHROPLASTY Right 2017     Family History   Problem Relation Age of Onset   • Breast cancer Sister    • Alzheimer's disease Mother    • Hypertension Mother    • No Known Problems Father    • No Known Problems Daughter    • No Known Problems Son    • No Known Problems Maternal Grandmother    • No Known Problems Maternal Grandfather    • No Known Problems Paternal Grandmother    • No Known Problems Paternal Grandfather    • Breast cancer Sister    • No Known Problems Daughter    •  "No Known Problems Son    • No Known Problems Son       reports that she has never smoked. She has never used smokeless tobacco. She reports that she does not drink alcohol or use drugs.      Current Outpatient Medications:   •  cetirizine (zyrTEC) 10 MG tablet, Take 10 mg by mouth Daily., Disp: , Rfl:   •  fluticasone (FLONASE) 50 MCG/ACT nasal spray, 1 SPRAY BY EACH NARE ROUTE 2 (TWO) TIMES A DAY., Disp: 16 mL, Rfl: 3  •  lisinopril (PRINIVIL,ZESTRIL) 5 MG tablet, Take 1 at bedtime for blood pressure, Disp: 90 tablet, Rfl: 3  •  azelastine (ASTELIN) 0.1 % nasal spray, 2 sprays into the nostril(s) as directed by provider 2 (Two) Times a Day. Use in each nostril as directed, Disp: 1 each, Rfl: 2    OBJECTIVE:  /77 (BP Location: Left arm, Patient Position: Sitting, Cuff Size: Large Adult)   Pulse 88   Temp 98 °F (36.7 °C) (Temporal)   Ht 170.2 cm (67.02\")   Wt 86.6 kg (191 lb)   SpO2 98%   BMI 29.90 kg/m²    Physical Exam   Constitutional: She is oriented to person, place, and time. Vital signs are normal. She appears well-developed and well-nourished.   HENT:   Right Ear: Tympanic membrane, external ear and ear canal normal.   Left Ear: Tympanic membrane, external ear and ear canal normal.   Mouth/Throat: Uvula is midline and oropharynx is clear and moist.   Neck: Carotid bruit is not present.   Cardiovascular: Normal rate and regular rhythm.   Pulmonary/Chest: Effort normal and breath sounds normal.   Abdominal: Soft. Bowel sounds are normal. She exhibits no distension and no mass. There is no tenderness. Hernia confirmed negative in the right inguinal area and confirmed negative in the left inguinal area.   Genitourinary: Pelvic exam was performed with patient supine. Cervix exhibits no motion tenderness.   Genitourinary Comments: Unable to visualize cervix due to growth.   Neurological: She is alert and oriented to person, place, and time.   Psychiatric: She has a normal mood and affect. Her behavior " is normal.   Vitals reviewed.      Assessment/Plan    Kisha was seen today for annual exam.    Diagnoses and all orders for this visit:    Annual physical exam  -     CBC w AUTO Differential  -     Comprehensive metabolic panel  -     TSH  -     T4, free  -     Lipid panel    Health maintenance  Pap performed.  Mammogram ordered.  Has already had flu shot this year.  Colonoscopy up-to-date.      Essential hypertension  -     POC Urinalysis Dipstick, Multipro    Mixed hyperlipidemia    Cervical mass    Breast cancer screening  -     Mammo Screening Bilateral With CAD; Future    Cervical cancer screening  -     PapIG, HPV, Rfx 16 / 18    Postnasal drip  -     Ambulatory Referral to ENT (Otolaryngology)  -     azelastine (ASTELIN) 0.1 % nasal spray; 2 sprays into the nostril(s) as directed by provider 2 (Two) Times a Day. Use in each nostril as directed  Could consider allergy testing, however, at this point the patient has dealt with this symptom for multiple years.  Will add Astelin and refer for specialist evaluation at this time.    Vaginal polyp    While I did not perform patient's exam 1 year ago, I was not able to visualize the cervix at this time.  Advised patient that I suspected growth head likely gotten larger.  I advised that she contact Dr. Ryder's office for follow-up.    An After Visit Summary was printed and given to the patient at discharge.  Return in about 6 months (around 6/26/2020) for Annual physical.           Electronically signed.

## 2019-12-26 NOTE — PATIENT INSTRUCTIONS
Preventing Unhealthy Weight Gain, Adult  Staying at a healthy weight is important to your overall health. When fat builds up in your body, you may become overweight or obese. Being overweight or obese increases your risk of developing certain health problems, such as heart disease, diabetes, sleeping problems, joint problems, and some types of cancer.  Unhealthy weight gain is often the result of making unhealthy food choices or not getting enough exercise. You can make changes to your lifestyle to prevent obesity and stay as healthy as possible.  What nutrition changes can be made?    · Eat only as much as your body needs. To do this:  ? Pay attention to signs that you are hungry or full. Stop eating as soon as you feel full.  ? If you feel hungry, try drinking water first before eating. Drink enough water so your urine is clear or pale yellow.  ? Eat smaller portions. Pay attention to portion sizes when eating out.  ? Look at serving sizes on food labels. Most foods contain more than one serving per container.  ? Eat the recommended number of calories for your gender and activity level. For most active people, a daily total of 2,000 calories is appropriate. If you are trying to lose weight or are not very active, you may need to eat fewer calories. Talk with your health care provider or a diet and nutrition specialist (dietitian) about how many calories you need each day.  · Choose healthy foods, such as:  ? Fruits and vegetables. At each meal, try to fill at least half of your plate with fruits and vegetables.  ? Whole grains, such as whole-wheat bread, brown rice, and quinoa.  ? Lean meats, such as chicken or fish.  ? Other healthy proteins, such as beans, eggs, or tofu.  ? Healthy fats, such as nuts, seeds, fatty fish, and olive oil.  ? Low-fat or fat-free dairy products.  · Check food labels, and avoid food and drinks that:  ? Are high in calories.  ? Have added sugar.  ? Are high in sodium.  ? Have saturated  fats or trans fats.  · Cook foods in healthier ways, such as by baking, broiling, or grilling.  · Make a meal plan for the week, and shop with a grocery list to help you stay on track with your purchases. Try to avoid going to the grocery store when you are hungry.  · When grocery shopping, try to shop around the outside of the store first, where the fresh foods are. Doing this helps you to avoid prepackaged foods, which can be high in sugar, salt (sodium), and fat.  What lifestyle changes can be made?    · Exercise for 30 or more minutes on 5 or more days each week. Exercising may include brisk walking, yard work, biking, running, swimming, and team sports like basketball and soccer. Ask your health care provider which exercises are safe for you.  · Do muscle-strengthening activities, such as lifting weights or using resistance bands, on 2 or more days a week.  · Do not use any products that contain nicotine or tobacco, such as cigarettes and e-cigarettes. If you need help quitting, ask your health care provider.  · Limit alcohol intake to no more than 1 drink a day for nonpregnant women and 2 drinks a day for men. One drink equals 12 oz of beer, 5 oz of wine, or 1½ oz of hard liquor.  · Try to get 7-9 hours of sleep each night.  What other changes can be made?  · Keep a food and activity journal to keep track of:  ? What you ate and how many calories you had. Remember to count the calories in sauces, dressings, and side dishes.  ? Whether you were active, and what exercises you did.  ? Your calorie, weight, and activity goals.  · Check your weight regularly. Track any changes. If you notice you have gained weight, make changes to your diet or activity routine.  · Avoid taking weight-loss medicines or supplements. Talk to your health care provider before starting any new medicine or supplement.  · Talk to your health care provider before trying any new diet or exercise plan.  Why are these changes  important?  Eating healthy, staying active, and having healthy habits can help you to prevent obesity. Those changes also:  · Help you manage stress and emotions.  · Help you connect with friends and family.  · Improve your self-esteem.  · Improve your sleep.  · Prevent long-term health problems.  What can happen if changes are not made?  Being obese or overweight can cause you to develop joint or bone problems, which can make it hard for you to stay active or do activities you enjoy. Being obese or overweight also puts stress on your heart and lungs and can lead to health problems like diabetes, heart disease, and some cancers.  Where to find more information  Talk with your health care provider or a dietitian about healthy eating and healthy lifestyle choices. You may also find information from:  · U.[place]. Department of Agriculture, MyPlate: www.MarketGidmyplate.gov  · American Heart Association: www.heart.org  · Centers for Disease Control and Prevention: www.cdc.gov  Summary  · Staying at a healthy weight is important to your overall health. It helps you to prevent certain diseases and health problems, such as heart disease, diabetes, joint problems, sleep disorders, and some types of cancer.  · Being obese or overweight can cause you to develop joint or bone problems, which can make it hard for you to stay active or do activities you enjoy.  · You can prevent unhealthy weight gain by eating a healthy diet, exercising regularly, not smoking, limiting alcohol, and getting enough sleep.  · Talk with your health care provider or a dietitian for guidance about healthy eating and healthy lifestyle choices.  This information is not intended to replace advice given to you by your health care provider. Make sure you discuss any questions you have with your health care provider.  Document Released: 12/19/2017 Document Revised: 09/28/2018 Document Reviewed: 01/24/2018  [employer] Interactive Patient Education © 2019 [employer]  Preventive Care 40-64 Years, Female  Preventive care refers to lifestyle choices and visits with your health care provider that can promote health and wellness.  What does preventive care include?    · A yearly physical exam. This is also called an annual well check.  · Dental exams once or twice a year.  · Routine eye exams. Ask your health care provider how often you should have your eyes checked.  · Personal lifestyle choices, including:  ? Daily care of your teeth and gums.  ? Regular physical activity.  ? Eating a healthy diet.  ? Avoiding tobacco and drug use.  ? Limiting alcohol use.  ? Practicing safe sex.  ? Taking low-dose aspirin daily starting at age 50.  ? Taking vitamin and mineral supplements as recommended by your health care provider.  What happens during an annual well check?  The services and screenings done by your health care provider during your annual well check will depend on your age, overall health, lifestyle risk factors, and family history of disease.  Counseling  Your health care provider may ask you questions about your:  · Alcohol use.  · Tobacco use.  · Drug use.  · Emotional well-being.  · Home and relationship well-being.  · Sexual activity.  · Eating habits.  · Work and work environment.  · Method of birth control.  · Menstrual cycle.  · Pregnancy history.  Screening  You may have the following tests or measurements:  · Height, weight, and BMI.  · Blood pressure.  · Lipid and cholesterol levels. These may be checked every 5 years, or more frequently if you are over 50 years old.  · Skin check.  · Lung cancer screening. You may have this screening every year starting at age 55 if you have a 30-pack-year history of smoking and currently smoke or have quit within the past 15 years.  · Colorectal cancer screening. All adults should have this screening starting at age 50 and continuing until age 75. Your health care provider may recommend screening at age 45. You will have tests  every 1-10 years, depending on your results and the type of screening test. People at increased risk should start screening at an earlier age. Screening tests may include:  ? Guaiac-based fecal occult blood testing.  ? Fecal immunochemical test (FIT).  ? Stool DNA test.  ? Virtual colonoscopy.  ? Sigmoidoscopy. During this test, a flexible tube with a tiny camera (sigmoidoscope) is used to examine your rectum and lower colon. The sigmoidoscope is inserted through your anus into your rectum and lower colon.  ? Colonoscopy. During this test, a long, thin, flexible tube with a tiny camera (colonoscope) is used to examine your entire colon and rectum.  · Hepatitis C blood test.  · Hepatitis B blood test.  · Sexually transmitted disease (STD) testing.  · Diabetes screening. This is done by checking your blood sugar (glucose) after you have not eaten for a while (fasting). You may have this done every 1-3 years.  · Mammogram. This may be done every 1-2 years. Talk to your health care provider about when you should start having regular mammograms. This may depend on whether you have a family history of breast cancer.  · BRCA-related cancer screening. This may be done if you have a family history of breast, ovarian, tubal, or peritoneal cancers.  · Pelvic exam and Pap test. This may be done every 3 years starting at age 21. Starting at age 30, this may be done every 5 years if you have a Pap test in combination with an HPV test.  · Bone density scan. This is done to screen for osteoporosis. You may have this scan if you are at high risk for osteoporosis.  Discuss your test results, treatment options, and if necessary, the need for more tests with your health care provider.  Vaccines  Your health care provider may recommend certain vaccines, such as:  · Influenza vaccine. This is recommended every year.  · Tetanus, diphtheria, and acellular pertussis (Tdap, Td) vaccine. You may need a Td booster every 10 years.  · Varicella  vaccine. You may need this if you have not been vaccinated.  · Zoster vaccine. You may need this after age 60.  · Measles, mumps, and rubella (MMR) vaccine. You may need at least one dose of MMR if you were born in 1957 or later. You may also need a second dose.  · Pneumococcal 13-valent conjugate (PCV13) vaccine. You may need this if you have certain conditions and were not previously vaccinated.  · Pneumococcal polysaccharide (PPSV23) vaccine. You may need one or two doses if you smoke cigarettes or if you have certain conditions.  · Meningococcal vaccine. You may need this if you have certain conditions.  · Hepatitis A vaccine. You may need this if you have certain conditions or if you travel or work in places where you may be exposed to hepatitis A.  · Hepatitis B vaccine. You may need this if you have certain conditions or if you travel or work in places where you may be exposed to hepatitis B.  · Haemophilus influenzae type b (Hib) vaccine. You may need this if you have certain conditions.  Talk to your health care provider about which screenings and vaccines you need and how often you need them.  This information is not intended to replace advice given to you by your health care provider. Make sure you discuss any questions you have with your health care provider.  Document Released: 01/13/2017 Document Revised: 02/07/2019 Document Reviewed: 10/18/2016  [employer] Interactive Patient Education © 2019 [employer]

## 2019-12-27 LAB
ALBUMIN SERPL-MCNC: 4.7 G/DL (ref 3.5–5.2)
ALBUMIN/GLOB SERPL: 1.8 G/DL
ALP SERPL-CCNC: 97 U/L (ref 39–117)
ALT SERPL-CCNC: 15 U/L (ref 1–33)
AST SERPL-CCNC: 20 U/L (ref 1–32)
BASOPHILS # BLD AUTO: 0.05 10*3/MM3 (ref 0–0.2)
BASOPHILS NFR BLD AUTO: 1 % (ref 0–1.5)
BILIRUB SERPL-MCNC: 0.3 MG/DL (ref 0.2–1.2)
BUN SERPL-MCNC: 16 MG/DL (ref 8–23)
BUN/CREAT SERPL: 23.2 (ref 7–25)
CALCIUM SERPL-MCNC: 9.9 MG/DL (ref 8.6–10.5)
CHLORIDE SERPL-SCNC: 97 MMOL/L (ref 98–107)
CHOLEST SERPL-MCNC: 263 MG/DL (ref 0–200)
CO2 SERPL-SCNC: 28.9 MMOL/L (ref 22–29)
CREAT SERPL-MCNC: 0.69 MG/DL (ref 0.57–1)
EOSINOPHIL # BLD AUTO: 0.23 10*3/MM3 (ref 0–0.4)
EOSINOPHIL NFR BLD AUTO: 4.4 % (ref 0.3–6.2)
ERYTHROCYTE [DISTWIDTH] IN BLOOD BY AUTOMATED COUNT: 12.5 % (ref 12.3–15.4)
GLOBULIN SER CALC-MCNC: 2.6 GM/DL
GLUCOSE SERPL-MCNC: 88 MG/DL (ref 65–99)
HCT VFR BLD AUTO: 46.3 % (ref 34–46.6)
HDLC SERPL-MCNC: 67 MG/DL (ref 40–60)
HGB BLD-MCNC: 15.4 G/DL (ref 12–15.9)
IMM GRANULOCYTES # BLD AUTO: 0.03 10*3/MM3 (ref 0–0.05)
IMM GRANULOCYTES NFR BLD AUTO: 0.6 % (ref 0–0.5)
LDLC SERPL CALC-MCNC: 162 MG/DL (ref 0–100)
LYMPHOCYTES # BLD AUTO: 2.01 10*3/MM3 (ref 0.7–3.1)
LYMPHOCYTES NFR BLD AUTO: 38.7 % (ref 19.6–45.3)
MCH RBC QN AUTO: 30.1 PG (ref 26.6–33)
MCHC RBC AUTO-ENTMCNC: 33.3 G/DL (ref 31.5–35.7)
MCV RBC AUTO: 90.6 FL (ref 79–97)
MONOCYTES # BLD AUTO: 0.45 10*3/MM3 (ref 0.1–0.9)
MONOCYTES NFR BLD AUTO: 8.7 % (ref 5–12)
NEUTROPHILS # BLD AUTO: 2.42 10*3/MM3 (ref 1.7–7)
NEUTROPHILS NFR BLD AUTO: 46.6 % (ref 42.7–76)
NRBC BLD AUTO-RTO: 0 /100 WBC (ref 0–0.2)
PLATELET # BLD AUTO: 215 10*3/MM3 (ref 140–450)
POTASSIUM SERPL-SCNC: 4.2 MMOL/L (ref 3.5–5.2)
PROT SERPL-MCNC: 7.3 G/DL (ref 6–8.5)
RBC # BLD AUTO: 5.11 10*6/MM3 (ref 3.77–5.28)
SODIUM SERPL-SCNC: 140 MMOL/L (ref 136–145)
T4 FREE SERPL-MCNC: 1.29 NG/DL (ref 0.93–1.7)
TRIGL SERPL-MCNC: 172 MG/DL (ref 0–150)
TSH SERPL DL<=0.005 MIU/L-ACNC: 3.62 UIU/ML (ref 0.27–4.2)
VLDLC SERPL CALC-MCNC: 34.4 MG/DL
WBC # BLD AUTO: 5.19 10*3/MM3 (ref 3.4–10.8)

## 2019-12-27 RX ORDER — ATORVASTATIN CALCIUM 20 MG/1
20 TABLET, FILM COATED ORAL DAILY
Qty: 90 TABLET | Refills: 3 | Status: SHIPPED | OUTPATIENT
Start: 2019-12-27 | End: 2020-12-11

## 2019-12-30 LAB
CYTOLOGIST CVX/VAG CYTO: NORMAL
CYTOLOGY CVX/VAG DOC CYTO: NORMAL
CYTOLOGY CVX/VAG DOC THIN PREP: NORMAL
DX ICD CODE: NORMAL
HIV 1 & 2 AB SER-IMP: NORMAL
HPV I/H RISK 1 DNA CVX QL PROBE+SIG AMP: NEGATIVE
Lab: NORMAL
OTHER STN SPEC: NORMAL
RECOM F/U CVX/VAG CYTO: NORMAL
STAT OF ADQ CVX/VAG CYTO-IMP: NORMAL

## 2019-12-31 NOTE — PROGRESS NOTES
Please advise patient that PAP was not resulted - lab said excessive lubricant. However, I am going to just have her follow up with OB/GYN.  I was unable to visualize cervix due to large polyp.  She is aware of this.  Let's get her an appt. With Dr. Syed and notify her.

## 2020-01-03 DIAGNOSIS — N84.1 CERVICAL POLYP: Primary | ICD-10-CM

## 2020-01-23 ENCOUNTER — OFFICE VISIT (OUTPATIENT)
Dept: OBSTETRICS AND GYNECOLOGY | Facility: CLINIC | Age: 65
End: 2020-01-23

## 2020-01-23 VITALS
SYSTOLIC BLOOD PRESSURE: 112 MMHG | HEIGHT: 67 IN | BODY MASS INDEX: 29.98 KG/M2 | WEIGHT: 191 LBS | DIASTOLIC BLOOD PRESSURE: 82 MMHG

## 2020-01-23 DIAGNOSIS — N95.0 POSTMENOPAUSAL BLEEDING: ICD-10-CM

## 2020-01-23 DIAGNOSIS — N88.8 CERVICAL MASS: Primary | ICD-10-CM

## 2020-01-23 PROCEDURE — 99213 OFFICE O/P EST LOW 20 MIN: CPT | Performed by: OBSTETRICS & GYNECOLOGY

## 2020-01-23 RX ORDER — SODIUM CHLORIDE 9 MG/ML
100 INJECTION, SOLUTION INTRAVENOUS CONTINUOUS
Status: CANCELLED | OUTPATIENT
Start: 2020-01-23

## 2020-01-23 NOTE — PROGRESS NOTES
"Kisha Mosher is a 64 y.o. female here today for treatment of a prolapsing uterine fibroid.  1 year ago she was scheduled to undergo hysteroscopy with D&C and myomectomy, but had to cancel her surgery due to family health issues.  Since her last visit, she has not had any significant vaginal bleeding or pelvic pain.  1 month ago she had an annual exam with her PCP, and by report the prolapsing fibroid is still present.  A pelvic ultrasound 1 year ago showed several small fibroids and a 14 mm endometrium.    Visit Vitals  /82 (BP Location: Left arm, Patient Position: Sitting)   Ht 170.2 cm (67.02\")   Wt 86.6 kg (191 lb)   BMI 29.90 kg/m²     Pleasant female no acute distress  Mood and affect normal  Breathing unlabored    Assessment: Prolapsing submucosal fibroid, thickened endometrium on ultrasound    We discussed that we still need to proceed with a evaluation of her endometrium, and removal of the prolapsing fibroid.  She would like to reschedule her procedure.  We discussed a planned surgical procedure of hysteroscopy with D&C, and myomectomy.  We discussed surgical risks of bleeding, infection, uterine perforation, and damage to surrounding structures.  All of her questions were answered and she desires to proceed.  Preoperative orders been placed and she is scheduled for surgery on February 19.      "

## 2020-01-27 NOTE — H&P
[facility]  Kisha Mosher  : 1955  MRN: [id number]  CSN: [id number]    History and Physical    Subjective   Kisha Mosher is a 64 y.o. year old  who presents for surgery due to postmenopausal bleeding, endometrial thickening on ultrasound, and a prolapsing submucosal fibroid.  She has several small fibroids on ultrasound, as well as a 14 mm endometrium.    Past Medical History:   Diagnosis Date   • Essential hypertension 2019   • Hyperlipidemia      Past Surgical History:   Procedure Laterality Date   •  SECTION     • COLONOSCOPY  2016    [facility] [place] DR TRUJILLO   • TOTAL KNEE ARTHROPLASTY Right 2017     Social History    Tobacco Use      Smoking status: Never Smoker      Smokeless tobacco: Never Used      Current Outpatient Medications:   •  atorvastatin (LIPITOR) 20 MG tablet, Take 1 tablet by mouth Daily., Disp: 90 tablet, Rfl: 3  •  azelastine (ASTELIN) 0.1 % nasal spray, 2 sprays into the nostril(s) as directed by provider 2 (Two) Times a Day. Use in each nostril as directed, Disp: 1 each, Rfl: 2  •  cetirizine (zyrTEC) 10 MG tablet, Take 10 mg by mouth Daily., Disp: , Rfl:   •  fluticasone (FLONASE) 50 MCG/ACT nasal spray, 1 SPRAY BY EACH NARE ROUTE 2 (TWO) TIMES A DAY., Disp: 16 mL, Rfl: 3  •  lisinopril (PRINIVIL,ZESTRIL) 5 MG tablet, Take 1 at bedtime for blood pressure, Disp: 90 tablet, Rfl: 3    No Known Allergies    Family History   Problem Relation Age of Onset   • Breast cancer Sister    • Alzheimer's disease Mother    • Hypertension Mother    • No Known Problems Father    • No Known Problems Daughter    • No Known Problems Son    • No Known Problems Maternal Grandmother    • No Known Problems Maternal Grandfather    • No Known Problems Paternal Grandmother    • No Known Problems Paternal Grandfather    • Breast cancer Sister    • No Known Problems Daughter    • No Known Problems Son    • No Known Problems Son      Review of Systems   Constitutional: Negative for  "unexpected weight change.   HENT: Negative for trouble swallowing.    Respiratory: Negative for shortness of breath.    Cardiovascular: Negative for chest pain.   Musculoskeletal: Negative for neck stiffness.   Neurological: Negative for seizures.   Hematological: Does not bruise/bleed easily.         Objective   /82 (BP Location: Left arm, Patient Position: Sitting)   Ht 170.2 cm (67.02\")   Wt 86.6 kg (191 lb)   BMI 29.90 kg/m²     Physical Exam   Physical Exam   Constitutional: She is oriented to person, place, and time. She appears well-developed and well-nourished.   HENT:   Head: Normocephalic and atraumatic.   Eyes: No scleral icterus.   Neck: No tracheal deviation present.   Cardiovascular: Normal rate and regular rhythm.   Pulmonary/Chest: Effort normal and breath sounds normal.   Abdominal: Soft. Bowel sounds are normal. She exhibits no distension. There is no tenderness.   Genitourinary: Uterus normal. Rectal exam shows no external hemorrhoid. Pelvic exam was performed with patient supine. There is no lesion on the right labia. There is no lesion on the left labia. Uterus is not enlarged, not fixed and not tender. Cervix exhibits no motion tenderness. Right adnexum displays no mass. Left adnexum displays no mass. No bleeding in the vagina. No vaginal discharge found.   Lymphadenopathy:        Right: No inguinal adenopathy present.        Left: No inguinal adenopathy present.   Neurological: She is alert and oriented to person, place, and time.   Skin: Skin is warm and dry.   Vitals reviewed.      Labs  Lab Results   Component Value Date     12/26/2019    HGB 15.4 12/26/2019    HCT 46.3 12/26/2019    WBC 5.19 12/26/2019     12/26/2019    K 4.2 12/26/2019    CL 97 (L) 12/26/2019    CO2 28.9 12/26/2019    BUN 16 12/26/2019    CREATININE 0.69 12/26/2019    ALBUMIN 4.70 12/26/2019    CALCIUM 9.9 12/26/2019    AST 20 12/26/2019    ALT 15 12/26/2019    BILITOT 0.3 12/26/2019 "        Assessment & Plan    Kisha was seen today for consult.    Diagnoses and all orders for this visit:    Cervical mass  -     Case Request  -     CBC (No Diff); Future  -     Basic Metabolic Panel; Future    Postmenopausal bleeding  -     CBC (No Diff); Future  -     Basic Metabolic Panel; Future    Other orders  -     Follow Anesthesia Guidelines / Standing Orders; Future  -     Provide NPO Instructions to Patient; Future      Risks, benefits, and alternatives of a D&C with hysteroscopy were discussed with the patient in detail. Intraoperative risks of bleeding and damage to surrounding organs, including but not limited to intestine, bladder and ureter, were explained. Management of these were also explained. Postoperative complications such as infection, pneumonia, DVT, and bleeding were explained. The importance of compliance with postoperative restrictions was discussed. The diagnostic nature of the procedure was explained.    Uterine perforation was discussed with the patient at length.     All of the patient's questions were answered to her satisfaction. She was encouraged to return for an additional appointment if she had further questions. She verbalized understanding of the above and wished to proceed with the outlined plan.      Dinh Syed MD  1/27/2020  7:59 AM

## 2020-01-28 ENCOUNTER — RESULTS ENCOUNTER (OUTPATIENT)
Dept: OBSTETRICS AND GYNECOLOGY | Facility: CLINIC | Age: 65
End: 2020-01-28

## 2020-01-28 DIAGNOSIS — N95.0 POSTMENOPAUSAL BLEEDING: ICD-10-CM

## 2020-01-28 DIAGNOSIS — N88.8 CERVICAL MASS: ICD-10-CM

## 2020-02-06 ENCOUNTER — OFFICE VISIT (OUTPATIENT)
Dept: OTOLARYNGOLOGY | Facility: CLINIC | Age: 65
End: 2020-02-06

## 2020-02-06 ENCOUNTER — APPOINTMENT (OUTPATIENT)
Dept: PREADMISSION TESTING | Facility: HOSPITAL | Age: 65
End: 2020-02-06

## 2020-02-06 VITALS
HEART RATE: 80 BPM | RESPIRATION RATE: 20 BRPM | SYSTOLIC BLOOD PRESSURE: 123 MMHG | HEIGHT: 67 IN | WEIGHT: 192.9 LBS | OXYGEN SATURATION: 98 % | DIASTOLIC BLOOD PRESSURE: 72 MMHG | BODY MASS INDEX: 30.28 KG/M2

## 2020-02-06 VITALS — BODY MASS INDEX: 30.61 KG/M2 | OXYGEN SATURATION: 97 % | HEIGHT: 67 IN | HEART RATE: 76 BPM | WEIGHT: 195 LBS

## 2020-02-06 DIAGNOSIS — R09.82 POSTNASAL DRIP: ICD-10-CM

## 2020-02-06 DIAGNOSIS — J31.0 CHRONIC RHINITIS: Primary | ICD-10-CM

## 2020-02-06 LAB
ANION GAP SERPL CALCULATED.3IONS-SCNC: 11 MMOL/L (ref 5–15)
BUN BLD-MCNC: 20 MG/DL (ref 8–23)
BUN/CREAT SERPL: 27.4 (ref 7–25)
CALCIUM SPEC-SCNC: 9.7 MG/DL (ref 8.6–10.5)
CHLORIDE SERPL-SCNC: 99 MMOL/L (ref 98–107)
CO2 SERPL-SCNC: 30 MMOL/L (ref 22–29)
CREAT BLD-MCNC: 0.73 MG/DL (ref 0.57–1)
DEPRECATED RDW RBC AUTO: 40.9 FL (ref 37–54)
ERYTHROCYTE [DISTWIDTH] IN BLOOD BY AUTOMATED COUNT: 12.4 % (ref 12.3–15.4)
GFR SERPL CREATININE-BSD FRML MDRD: 81 ML/MIN/1.73
GLUCOSE BLD-MCNC: 94 MG/DL (ref 65–99)
HCT VFR BLD AUTO: 46.7 % (ref 34–46.6)
HGB BLD-MCNC: 15.7 G/DL (ref 12–15.9)
MCH RBC QN AUTO: 29.7 PG (ref 26.6–33)
MCHC RBC AUTO-ENTMCNC: 33.6 G/DL (ref 31.5–35.7)
MCV RBC AUTO: 88.3 FL (ref 79–97)
PLATELET # BLD AUTO: 223 10*3/MM3 (ref 140–450)
PMV BLD AUTO: 11.8 FL (ref 6–12)
POTASSIUM BLD-SCNC: 4 MMOL/L (ref 3.5–5.2)
RBC # BLD AUTO: 5.29 10*6/MM3 (ref 3.77–5.28)
SODIUM BLD-SCNC: 140 MMOL/L (ref 136–145)
WBC NRBC COR # BLD: 4.66 10*3/MM3 (ref 3.4–10.8)

## 2020-02-06 PROCEDURE — 99203 OFFICE O/P NEW LOW 30 MIN: CPT | Performed by: OTOLARYNGOLOGY

## 2020-02-06 PROCEDURE — 80048 BASIC METABOLIC PNL TOTAL CA: CPT | Performed by: OBSTETRICS & GYNECOLOGY

## 2020-02-06 PROCEDURE — 93005 ELECTROCARDIOGRAM TRACING: CPT

## 2020-02-06 PROCEDURE — 85027 COMPLETE CBC AUTOMATED: CPT | Performed by: OBSTETRICS & GYNECOLOGY

## 2020-02-06 PROCEDURE — 93010 ELECTROCARDIOGRAM REPORT: CPT | Performed by: INTERNAL MEDICINE

## 2020-02-06 PROCEDURE — 36415 COLL VENOUS BLD VENIPUNCTURE: CPT | Performed by: OBSTETRICS & GYNECOLOGY

## 2020-02-06 NOTE — PROGRESS NOTES
Subjective   Kisha Mosher is a 64 y.o. female.     History of Present Illness   Patient is here for evaluation of postnasal drainage.  Says is been going on for more than a year.  Has received steroid shots every 3 months.  She says back around [place] her medical regimen was changed to Astelin instead of Flonase and this seems to be working better although she still has intermittent dry cough.  No purulent rhinorrhea.  Says she does not have much trouble with acid reflux.  No trouble swallowing.  Associated symptoms include sneezing.  Does not seem to be worse with eating      The following portions of the patient's history were reviewed and updated as appropriate: allergies, current medications, past family history, past medical history, past social history, past surgical history and problem list.      Kisha Mosher reports that she has never smoked. She has never used smokeless tobacco. She reports that she does not drink alcohol or use drugs.  Patient is not a tobacco user and has not been counseled for use of tobacco products    Family History   Problem Relation Age of Onset   • Breast cancer Sister    • Alzheimer's disease Mother    • Hypertension Mother    • No Known Problems Father    • No Known Problems Daughter    • No Known Problems Son    • No Known Problems Maternal Grandmother    • No Known Problems Maternal Grandfather    • No Known Problems Paternal Grandmother    • No Known Problems Paternal Grandfather    • Breast cancer Sister    • No Known Problems Daughter    • No Known Problems Son    • No Known Problems Son        No Known Allergies      Current Outpatient Medications:   •  atorvastatin (LIPITOR) 20 MG tablet, Take 1 tablet by mouth Daily., Disp: 90 tablet, Rfl: 3  •  azelastine (ASTELIN) 0.1 % nasal spray, 2 sprays into the nostril(s) as directed by provider 2 (Two) Times a Day. Use in each nostril as directed, Disp: 1 each, Rfl: 2  •  cetirizine (zyrTEC) 10 MG tablet, Take 10 mg by  mouth Daily., Disp: , Rfl:   •  lisinopril (PRINIVIL,ZESTRIL) 5 MG tablet, Take 1 at bedtime for blood pressure, Disp: 90 tablet, Rfl: 3    Past Medical History:   Diagnosis Date   • Essential hypertension 2019   • Fibroid uterus    • Glaucoma    • Hyperlipidemia        Past Surgical History:   Procedure Laterality Date   •  SECTION     • COLONOSCOPY  2016    [facility] [place]   • EYE SURGERY Right    • JOINT REPLACEMENT     • TOTAL KNEE ARTHROPLASTY Right 2017   • TUBAL ABDOMINAL LIGATION           Review of Systems   HENT: Positive for postnasal drip.    Respiratory: Positive for cough.    All other systems reviewed and are negative.          Objective   Physical Exam  General: Well-developed well-nourished female in no acute distress.  Alert and oriented x-3. Head: Normocephalic. Face: Symmetrical strength and appearance. PERRL. EOMI. Voice:Strong. Speech:Fluent  Ears: External ears no deformity, canals no discharge, tympanic membranes intact clear and mobile bilaterally.  Nose: Nares show no pale boggy mucosa with mild clear discharge, no mass, polyp, purulence.  Septum: Midline  Oral cavity: Lips and gums without lesions.  Tongue and floor of mouth without lesions.  Parotid and submandibular ducts unobstructed.  No mucosal lesions on the buccal mucosa or vestibule of the mouth.  Pharynx: No erythema exudate mass or ulcer.  2+ tonsils.  Neck: No lymphadenopathy.  No thyromegaly.  Trachea and larynx midline.  No masses in the parotid or submandibular glands.        Assessment/Plan   Kisha was seen today for nasal congestion.    Diagnoses and all orders for this visit:    Chronic rhinitis    Postnasal drip      Plan: Discussed options with the patient.  I told her that since cough was a predominant symptom I would like her to discuss with Dr. darling possibly changing her lisinopril to a non-ACE inhibitor.  I told her she had the option of adding a nasal steroid to the Astelin that  she is using but she says she used Flonase for a long time and of late it is had no benefit.  She also mentions in passing that she previously took indapamide for a suppose a inner ear condition however she never saw an otolaryngologist and states she ran out of that prescription a while ago and wants to know if I will refill it.  I explained to her that indapamide was a diuretic and that without a definitive diagnosis I would not want to prescribe it myself but she should discuss with Dr. darling whether or not that might be a viable substitute for her lisinopril.  I will see her again in 3 months, sooner for problems.

## 2020-02-06 NOTE — DISCHARGE INSTRUCTIONS
DAY OF SURGERY INSTRUCTIONS        YOUR SURGEON: ***SUBLETTE    PROCEDURE: ***D&C HYSTEROSCOPY     DATE OF SURGERY: ***2/19/2020    ARRIVAL TIME: AS DIRECTED BY OFFICE      ALL OTHER HOME MEDICATIONS CHECK WITH YOUR DOCTOR              MANAGING PAIN AFTER SURGERY    We know you are probably wondering what your pain will be like after surgery.  Following surgery it is unrealistic to expect you will not have pain.   Pain is how our bodies let us know that something is wrong or cautions us to be careful.  That said, our goal is to make your pain tolerable.    Methods we may use to treat your pain include (oral or IV medications, PCAs, epidurals, nerve blocks, etc.)   While some procedures require IV pain medications for a short time after surgery, transitioning to pain medications by mouth allows for better management of pain.   Your nurse will encourage you to take oral pain medications whenever possible.  IV medications work almost immediately, but only last a short while.  Taking medications by mouth allows for a more constant level of medication in your blood stream for a longer period of time.      Once your pain is out of control it is harder to get back under control.  It is important you are aware when your next dose of pain medication is due.  If you are admitted, your nurse may write the time of your next dose on the white board in your room to help you remember.      We are interested in your pain and encourage you to inform us about aggravating factors during your visit.   Many times a simple repositioning every few hours can make a big difference.    If your physician says it is okay, do not let your pain prevent you from getting out of bed. Be sure to call your nurse for assistance prior to getting up so you do not fall.      Before surgery, please decide your tolerable pain goal.  These faces help describe the pain ratings we use on a 0-10 scale.   Be prepared to tell us your goal and whether or not you  take pain or anxiety medications at home.      BEFORE YOU COME TO THE HOSPITAL  (Pre-op instructions)  • Do not eat, drink, smoke or chew gum after midnight the night before surgery.  This also includes no mints.  • Morning of surgery take only the medicines you have been instructed with a sip of water unless otherwise instructed  by your physician.  • Do not shave, wear makeup or dark nail polish.  • Remove all jewelry including rings.  • Leave anything you consider valuable at home.  • Leave your suitcase in the car until after your surgery.  • Bring the following with you if applicable:  o Picture ID and insurance, Medicare or Medicaid cards  o Co-pay/deductible required by insurance (cash, check, credit card)  o Copy of advance directive, living will or power-of- documents if not brought to PAT  o CPAP or BIPAP mask and tubing  o Relaxation aids ( book, magazine), etc.  o Hearing aids                                 ON THE DAY OF SURGERY  · On the day of surgery check in at registration located at the main entrance of the hospital.   ? You will be registered and given a beeper with instructions where to wait in the main lobby.  ? When your beeper lights up and vibrates a member of the Outpatient Surgery staff will meet you at the double doors under the stair steps and escort you to your preoperative room.   · You may have cloth compression devices placed on your legs. These help to prevent blood clots and reduce swelling in your legs.  · An IV may be inserted into one of your veins.  · In the operating room, you may be given one or more of the following:  ? A medicine to help you relax (sedative).  ? A medicine to numb the area (local anesthetic).  ? A medicine to make you fall asleep (general anesthetic).  ? A medicine that is injected into an area of your body to numb everything below the injection site (regional anesthetic).  · Your surgical site will be marked or identified.  · You may be given an  "antibiotic through your IV to help prevent infection.  Contact a health care provider if you:  · Develop a fever of more than 100.4°F (38°C) or other feelings of illness during the 48 hours before your surgery.  · Have symptoms that get worse.  Have questions or concerns about your surgery    General Anesthesia/Surgery, Adult  General anesthesia is the use of medicines to make a person \"go to sleep\" (unconscious) for a medical procedure. General anesthesia must be used for certain procedures, and is often recommended for procedures that:  · Last a long time.  · Require you to be still or in an unusual position.  · Are major and can cause blood loss.  The medicines used for general anesthesia are called general anesthetics. As well as making you unconscious for a certain amount of time, these medicines:  · Prevent pain.  · Control your blood pressure.  · Relax your muscles.  Tell a health care provider about:  · Any allergies you have.  · All medicines you are taking, including vitamins, herbs, eye drops, creams, and over-the-counter medicines.  · Any problems you or family members have had with anesthetic medicines.  · Types of anesthetics you have had in the past.  · Any blood disorders you have.  · Any surgeries you have had.  · Any medical conditions you have.  · Any recent upper respiratory, chest, or ear infections.  · Any history of:  ? Heart or lung conditions, such as heart failure, sleep apnea, asthma, or chronic obstructive pulmonary disease (COPD).  ?  service.  ? Depression or anxiety.  · Any tobacco or drug use, including marijuana or alcohol use.  · Whether you are pregnant or may be pregnant.  What are the risks?  Generally, this is a safe procedure. However, problems may occur, including:  · Allergic reaction.  · Lung and heart problems.  · Inhaling food or liquid from the stomach into the lungs (aspiration).  · Nerve injury.  · Air in the bloodstream, which can lead to stroke.  · Extreme " agitation or confusion (delirium) when you wake up from the anesthetic.  · Waking up during your procedure and being unable to move. This is rare.  These problems are more likely to develop if you are having a major surgery or if you have an advanced or serious medical condition. You can prevent some of these complications by answering all of your health care provider's questions thoroughly and by following all instructions before your procedure.  General anesthesia can cause side effects, including:  · Nausea or vomiting.  · A sore throat from the breathing tube.  · Hoarseness.  · Wheezing or coughing.  · Shaking chills.  · Tiredness.  · Body aches.  · Anxiety.  · Sleepiness or drowsiness.  · Confusion or agitation.  RISKS AND COMPLICATIONS OF SURGERY  Your health care provider will discuss possible risks and complications with you before surgery. Common risks and complications include:    · Problems due to the use of anesthetics.  · Blood loss and replacement (does not apply to minor surgical procedures).  · Temporary increase in pain due to surgery.  · Uncorrected pain or problems that the surgery was meant to correct.  · Infection.  · New damage.    What happens before the procedure?    Medicines  Ask your health care provider about:  · Changing or stopping your regular medicines. This is especially important if you are taking diabetes medicines or blood thinners.  · Taking medicines such as aspirin and ibuprofen. These medicines can thin your blood. Do not take these medicines unless your health care provider tells you to take them.  · Taking over-the-counter medicines, vitamins, herbs, and supplements. Do not take these during the week before your procedure unless your health care provider approves them.  General instructions  · Starting 3-6 weeks before the procedure, do not use any products that contain nicotine or tobacco, such as cigarettes and e-cigarettes. If you need help quitting, ask your health care  provider.  · If you brush your teeth on the morning of the procedure, make sure to spit out all of the toothpaste.  · Tell your health care provider if you become ill or develop a cold, cough, or fever.  · If instructed by your health care provider, bring your sleep apnea device with you on the day of your surgery (if applicable).  · Ask your health care provider if you will be going home the same day, the following day, or after a longer hospital stay.  ? Plan to have someone take you home from the hospital or clinic.  ? Plan to have a responsible adult care for you for at least 24 hours after you leave the hospital or clinic. This is important.  What happens during the procedure?  · You will be given anesthetics through both of the following:  ? A mask placed over your nose and mouth.  ? An IV in one of your veins.  · You may receive a medicine to help you relax (sedative).  · After you are unconscious, a breathing tube may be inserted down your throat to help you breathe. This will be removed before you wake up.  · An anesthesia specialist will stay with you throughout your procedure. He or she will:  ? Keep you comfortable and safe by continuing to give you medicines and adjusting the amount of medicine that you get.  ? Monitor your blood pressure, pulse, and oxygen levels to make sure that the anesthetics do not cause any problems.  The procedure may vary among health care providers and hospitals.  What happens after the procedure?  · Your blood pressure, temperature, heart rate, breathing rate, and blood oxygen level will be monitored until the medicines you were given have worn off.  · You will wake up in a recovery area. You may wake up slowly.  · If you feel anxious or agitated, you may be given medicine to help you calm down.  · If you will be going home the same day, your health care provider may check to make sure you can walk, drink, and urinate.  · Your health care provider will treat any pain or side  effects you have before you go home.  · Do not drive for 24 hours if you were given a sedative.  Summary  · General anesthesia is used to keep you still and prevent pain during a procedure.  · It is important to tell your healthcare provider about your medical history and any surgeries you have had, and previous experience with anesthesia.  · Follow your healthcare provider’s instructions about when to stop eating, drinking, or taking certain medicines before your procedure.  · Plan to have someone take you home from the hospital or clinic.  This information is not intended to replace advice given to you by your health care provider. Make sure you discuss any questions you have with your health care provider.  Document Released: 03/26/2009 Document Revised: 08/03/2018 Document Reviewed: 08/03/2018  [employer] Interactive Patient Education © 2019 [employer]      Fall Prevention in Hospitals, Adult  As a hospital patient, your condition and the treatments you receive can increase your risk for falls. Some additional risk factors for falls in a hospital include:  · Being in an unfamiliar environment.  · Being on bed rest.  · Your surgery.  · Taking certain medicines.  · Your tubing requirements, such as intravenous (IV) therapy or catheters.  It is important that you learn how to decrease fall risks while at the hospital. Below are important tips that can help prevent falls.  SAFETY TIPS FOR PREVENTING FALLS  Talk about your risk of falling.  · Ask your health care provider why you are at risk for falling. Is it your medicine, illness, tubing placement, or something else?  · Make a plan with your health care provider to keep you safe from falls.  · Ask your health care provider or pharmacist about side effects of your medicines. Some medicines can make you dizzy or affect your coordination.  Ask for help.  · Ask for help before getting out of bed. You may need to press your call button.  · Ask for assistance in  getting safely to the toilet.  · Ask for a walker or cane to be put at your bedside. Ask that most of the side rails on your bed be placed up before your health care provider leaves the room.  · Ask family or friends to sit with you.  · Ask for things that are out of your reach, such as your glasses, hearing aids, telephone, bedside table, or call button.  Follow these tips to avoid falling:  · Stay lying or seated, rather than standing, while waiting for help.  · Wear rubber-soled slippers or shoes whenever you walk in the hospital.  · Avoid quick, sudden movements.  ¨ Change positions slowly.  ¨ Sit on the side of your bed before standing.  ¨ Stand up slowly and wait before you start to walk.  · Let your health care provider know if there is a spill on the floor.  · Pay careful attention to the medical equipment, electrical cords, and tubes around you.  · When you need help, use your call button by your bed or in the bathroom. Wait for one of your health care providers to help you.  · If you feel dizzy or unsure of your footing, return to bed and wait for assistance.  · Avoid being distracted by the TV, telephone, or another person in your room.  · Do not lean or support yourself on rolling objects, such as IV poles or bedside tables.     This information is not intended to replace advice given to you by your health care provider. Make sure you discuss any questions you have with your health care provider.     Document Released: 12/15/2001 Document Revised: 01/08/2016 Document Reviewed: 08/25/2013  [employer] Interactive Patient Education ©2016 [employer]            PATIENT/FAMILY/RESPONSIBLE PARTY VERBALIZES UNDERSTANDING OF ABOVE EDUCATION.  COPY OF PAIN SCALE GIVEN AND REVIEWED WITH VERBALIZED UNDERSTANDING.

## 2020-02-19 ENCOUNTER — ANESTHESIA (OUTPATIENT)
Dept: PERIOP | Facility: HOSPITAL | Age: 65
End: 2020-02-19

## 2020-02-19 ENCOUNTER — HOSPITAL ENCOUNTER (OUTPATIENT)
Facility: HOSPITAL | Age: 65
Setting detail: HOSPITAL OUTPATIENT SURGERY
Discharge: HOME | End: 2020-02-19
Attending: OBSTETRICS & GYNECOLOGY | Admitting: OBSTETRICS & GYNECOLOGY

## 2020-02-19 ENCOUNTER — ANESTHESIA EVENT (OUTPATIENT)
Dept: PERIOP | Facility: HOSPITAL | Age: 65
End: 2020-02-19

## 2020-02-19 VITALS
RESPIRATION RATE: 20 BRPM | SYSTOLIC BLOOD PRESSURE: 115 MMHG | HEART RATE: 63 BPM | OXYGEN SATURATION: 98 % | TEMPERATURE: 97.5 F | DIASTOLIC BLOOD PRESSURE: 70 MMHG

## 2020-02-19 DIAGNOSIS — N88.8 CERVICAL MASS: ICD-10-CM

## 2020-02-19 DIAGNOSIS — N95.0 POSTMENOPAUSAL BLEEDING: ICD-10-CM

## 2020-02-19 PROCEDURE — 25010000002 ONDANSETRON PER 1 MG: Performed by: NURSE ANESTHETIST, CERTIFIED REGISTERED

## 2020-02-19 PROCEDURE — 88305 TISSUE EXAM BY PATHOLOGIST: CPT | Performed by: OBSTETRICS & GYNECOLOGY

## 2020-02-19 PROCEDURE — 25010000002 MIDAZOLAM PER 1 MG: Performed by: ANESTHESIOLOGY

## 2020-02-19 PROCEDURE — 25010000002 DEXAMETHASONE PER 1 MG: Performed by: ANESTHESIOLOGY

## 2020-02-19 PROCEDURE — 25010000002 KETOROLAC TROMETHAMINE PER 15 MG: Performed by: NURSE ANESTHETIST, CERTIFIED REGISTERED

## 2020-02-19 PROCEDURE — 25010000002 FENTANYL CITRATE (PF) 100 MCG/2ML SOLUTION: Performed by: NURSE ANESTHETIST, CERTIFIED REGISTERED

## 2020-02-19 PROCEDURE — 25010000002 PROPOFOL 10 MG/ML EMULSION: Performed by: NURSE ANESTHETIST, CERTIFIED REGISTERED

## 2020-02-19 PROCEDURE — 58558 HYSTEROSCOPY BIOPSY: CPT | Performed by: OBSTETRICS & GYNECOLOGY

## 2020-02-19 RX ORDER — ACETAMINOPHEN 500 MG
1000 TABLET ORAL ONCE
Status: COMPLETED | OUTPATIENT
Start: 2020-02-19 | End: 2020-02-19

## 2020-02-19 RX ORDER — KETOROLAC TROMETHAMINE 30 MG/ML
INJECTION, SOLUTION INTRAMUSCULAR; INTRAVENOUS AS NEEDED
Status: DISCONTINUED | OUTPATIENT
Start: 2020-02-19 | End: 2020-02-19 | Stop reason: SURG

## 2020-02-19 RX ORDER — DEXAMETHASONE SODIUM PHOSPHATE 4 MG/ML
4 INJECTION, SOLUTION INTRA-ARTICULAR; INTRALESIONAL; INTRAMUSCULAR; INTRAVENOUS; SOFT TISSUE ONCE AS NEEDED
Status: COMPLETED | OUTPATIENT
Start: 2020-02-19 | End: 2020-02-19

## 2020-02-19 RX ORDER — SODIUM CHLORIDE, SODIUM LACTATE, POTASSIUM CHLORIDE, CALCIUM CHLORIDE 600; 310; 30; 20 MG/100ML; MG/100ML; MG/100ML; MG/100ML
1000 INJECTION, SOLUTION INTRAVENOUS CONTINUOUS
Status: DISCONTINUED | OUTPATIENT
Start: 2020-02-19 | End: 2020-02-19 | Stop reason: HOSPADM

## 2020-02-19 RX ORDER — SODIUM CHLORIDE 0.9 % (FLUSH) 0.9 %
3 SYRINGE (ML) INJECTION AS NEEDED
Status: DISCONTINUED | OUTPATIENT
Start: 2020-02-19 | End: 2020-02-19 | Stop reason: HOSPADM

## 2020-02-19 RX ORDER — HYDROCODONE BITARTRATE AND ACETAMINOPHEN 5; 325 MG/1; MG/1
1 TABLET ORAL EVERY 6 HOURS PRN
Qty: 6 TABLET | Refills: 0 | Status: SHIPPED | OUTPATIENT
Start: 2020-02-19 | End: 2020-03-06

## 2020-02-19 RX ORDER — FENTANYL CITRATE 50 UG/ML
INJECTION, SOLUTION INTRAMUSCULAR; INTRAVENOUS AS NEEDED
Status: DISCONTINUED | OUTPATIENT
Start: 2020-02-19 | End: 2020-02-19 | Stop reason: SURG

## 2020-02-19 RX ORDER — LABETALOL HYDROCHLORIDE 5 MG/ML
5 INJECTION, SOLUTION INTRAVENOUS
Status: CANCELLED | OUTPATIENT
Start: 2020-02-19

## 2020-02-19 RX ORDER — ONDANSETRON 2 MG/ML
4 INJECTION INTRAMUSCULAR; INTRAVENOUS ONCE AS NEEDED
Status: DISCONTINUED | OUTPATIENT
Start: 2020-02-19 | End: 2020-02-19 | Stop reason: HOSPADM

## 2020-02-19 RX ORDER — MIDAZOLAM HYDROCHLORIDE 1 MG/ML
1 INJECTION INTRAMUSCULAR; INTRAVENOUS
Status: DISCONTINUED | OUTPATIENT
Start: 2020-02-19 | End: 2020-02-19 | Stop reason: HOSPADM

## 2020-02-19 RX ORDER — OXYCODONE AND ACETAMINOPHEN 7.5; 325 MG/1; MG/1
2 TABLET ORAL EVERY 4 HOURS PRN
Status: CANCELLED | OUTPATIENT
Start: 2020-02-19 | End: 2020-02-29

## 2020-02-19 RX ORDER — NALOXONE HCL 0.4 MG/ML
0.4 VIAL (ML) INJECTION AS NEEDED
Status: CANCELLED | OUTPATIENT
Start: 2020-02-19

## 2020-02-19 RX ORDER — ONDANSETRON 2 MG/ML
INJECTION INTRAMUSCULAR; INTRAVENOUS AS NEEDED
Status: DISCONTINUED | OUTPATIENT
Start: 2020-02-19 | End: 2020-02-19 | Stop reason: SURG

## 2020-02-19 RX ORDER — IPRATROPIUM BROMIDE AND ALBUTEROL SULFATE 2.5; .5 MG/3ML; MG/3ML
3 SOLUTION RESPIRATORY (INHALATION) ONCE AS NEEDED
Status: CANCELLED | OUTPATIENT
Start: 2020-02-19

## 2020-02-19 RX ORDER — MIDAZOLAM HYDROCHLORIDE 1 MG/ML
2 INJECTION INTRAMUSCULAR; INTRAVENOUS
Status: DISCONTINUED | OUTPATIENT
Start: 2020-02-19 | End: 2020-02-19 | Stop reason: HOSPADM

## 2020-02-19 RX ORDER — HYDROCODONE BITARTRATE AND ACETAMINOPHEN 5; 325 MG/1; MG/1
1 TABLET ORAL ONCE AS NEEDED
Status: DISCONTINUED | OUTPATIENT
Start: 2020-02-19 | End: 2020-02-19 | Stop reason: HOSPADM

## 2020-02-19 RX ORDER — SODIUM CHLORIDE 0.9 % (FLUSH) 0.9 %
3 SYRINGE (ML) INJECTION EVERY 12 HOURS SCHEDULED
Status: DISCONTINUED | OUTPATIENT
Start: 2020-02-19 | End: 2020-02-19 | Stop reason: HOSPADM

## 2020-02-19 RX ORDER — SODIUM CHLORIDE 0.9 % (FLUSH) 0.9 %
3-10 SYRINGE (ML) INJECTION AS NEEDED
Status: DISCONTINUED | OUTPATIENT
Start: 2020-02-19 | End: 2020-02-19 | Stop reason: HOSPADM

## 2020-02-19 RX ORDER — INDAPAMIDE 1.25 MG/1
1.25 TABLET, FILM COATED ORAL DAILY
COMMUNITY
End: 2020-03-19 | Stop reason: SDUPTHER

## 2020-02-19 RX ORDER — SODIUM CHLORIDE 9 MG/ML
100 INJECTION, SOLUTION INTRAVENOUS CONTINUOUS
Status: DISCONTINUED | OUTPATIENT
Start: 2020-02-19 | End: 2020-02-19 | Stop reason: HOSPADM

## 2020-02-19 RX ORDER — LIDOCAINE HYDROCHLORIDE 10 MG/ML
0.5 INJECTION, SOLUTION EPIDURAL; INFILTRATION; INTRACAUDAL; PERINEURAL ONCE AS NEEDED
Status: DISCONTINUED | OUTPATIENT
Start: 2020-02-19 | End: 2020-02-19 | Stop reason: HOSPADM

## 2020-02-19 RX ORDER — FENTANYL CITRATE 50 UG/ML
25 INJECTION, SOLUTION INTRAMUSCULAR; INTRAVENOUS
Status: CANCELLED | OUTPATIENT
Start: 2020-02-19

## 2020-02-19 RX ORDER — OXYCODONE AND ACETAMINOPHEN 10; 325 MG/1; MG/1
1 TABLET ORAL ONCE AS NEEDED
Status: CANCELLED | OUTPATIENT
Start: 2020-02-19

## 2020-02-19 RX ORDER — ONDANSETRON 2 MG/ML
4 INJECTION INTRAMUSCULAR; INTRAVENOUS ONCE AS NEEDED
Status: CANCELLED | OUTPATIENT
Start: 2020-02-19

## 2020-02-19 RX ORDER — SODIUM CHLORIDE, SODIUM LACTATE, POTASSIUM CHLORIDE, CALCIUM CHLORIDE 600; 310; 30; 20 MG/100ML; MG/100ML; MG/100ML; MG/100ML
100 INJECTION, SOLUTION INTRAVENOUS CONTINUOUS
Status: DISCONTINUED | OUTPATIENT
Start: 2020-02-19 | End: 2020-02-19 | Stop reason: HOSPADM

## 2020-02-19 RX ORDER — PROPOFOL 10 MG/ML
VIAL (ML) INTRAVENOUS AS NEEDED
Status: DISCONTINUED | OUTPATIENT
Start: 2020-02-19 | End: 2020-02-19 | Stop reason: SURG

## 2020-02-19 RX ADMIN — DEXAMETHASONE SODIUM PHOSPHATE 4 MG: 4 INJECTION, SOLUTION INTRAMUSCULAR; INTRAVENOUS at 06:43

## 2020-02-19 RX ADMIN — ONDANSETRON HYDROCHLORIDE 4 MG: 2 SOLUTION INTRAMUSCULAR; INTRAVENOUS at 07:53

## 2020-02-19 RX ADMIN — LIDOCAINE HYDROCHLORIDE 80 MG: 20 INJECTION, SOLUTION INTRAVENOUS at 07:38

## 2020-02-19 RX ADMIN — FENTANYL CITRATE 50 MCG: 50 INJECTION, SOLUTION INTRAMUSCULAR; INTRAVENOUS at 07:38

## 2020-02-19 RX ADMIN — KETOROLAC TROMETHAMINE 30 MG: 30 INJECTION, SOLUTION INTRAMUSCULAR at 07:53

## 2020-02-19 RX ADMIN — MIDAZOLAM 2 MG: 1 INJECTION INTRAMUSCULAR; INTRAVENOUS at 06:44

## 2020-02-19 RX ADMIN — SODIUM CHLORIDE, POTASSIUM CHLORIDE, SODIUM LACTATE AND CALCIUM CHLORIDE 1000 ML: 600; 310; 30; 20 INJECTION, SOLUTION INTRAVENOUS at 06:02

## 2020-02-19 RX ADMIN — ACETAMINOPHEN 1000 MG: 500 TABLET, FILM COATED ORAL at 06:43

## 2020-02-19 RX ADMIN — PROPOFOL 150 MG: 10 INJECTION, EMULSION INTRAVENOUS at 07:38

## 2020-02-19 NOTE — ANESTHESIA PREPROCEDURE EVALUATION
Anesthesia Evaluation     Patient summary reviewed   no history of anesthetic complications:  NPO Solid Status: > 8 hours  NPO Liquid Status: > 8 hours           Airway   Mallampati: I  TM distance: >3 FB  Neck ROM: full  No difficulty expected  Dental - normal exam         Pulmonary - negative pulmonary ROS   Cardiovascular   Exercise tolerance: good (4-7 METS)    (+) hypertension, hyperlipidemia,       Neuro/Psych- negative ROS    ROS Comment:  Glaucoma  GI/Hepatic/Renal/Endo - negative ROS     Musculoskeletal (-) negative ROS    Abdominal    Substance History      OB/GYN          Other - negative ROS                       Anesthesia Plan    ASA 2     general     intravenous induction     Anesthetic plan, all risks, benefits, and alternatives have been provided, discussed and informed consent has been obtained with: patient.

## 2020-02-19 NOTE — OP NOTE
Dinh Syed MD  [facility]  [address]  [place] [id number]  Office [phone]  Fax [phone]      [facility]  Kisha Mosher  1955  [id number]  [id number]  2/19/2020      Pre-operative Diagnosis: Postmenopausal Vaginal Bleeding, cervix mass    Post-operative Diagnosis: Postmenopausal Vaginal Bleeding, cervix mass    Operation: Hysteroscopic Polypectomy    Surgeon: Dinh Syed MD, FACOG    Assistants: none    Anesthesia: General endotracheal anesthesia    Findings: Minimal uterine descent and 2 cm round mass protruding through the external cervical os.  It was on a long stalk measuring about 4 cm. The uterine cavity otherwise appeared normal and atrophic.  There were no other lesions noted.    Estimated Blood Loss: Minimal      Specimens: Cervical mass    Complications:  None    Disposition: PACU - hemodynamically stable.      Procedure Details      After consents were obtained the patient was taken to the operating room, where general anesthesia was administered. She was placed in dorsal lithotomy position, with care taken in placement of legs to avoid nerve injury. She is prepped and draped in the usual sterile fashion.    In-and-out catheter was placed into the bladder for decompression. The cervix was identified and grasped with a single-tooth tenaculum. Please see comments above regarding details of the exam.  The cervical mass was grasped with a ring forcep and rotated until it detached from the stalk. Endocervical curettage not indicated. The cervix was already dilated due to the mass and the MyoSure hysteroscope was inserted under direct visualization. The above findings were noted.     Fluid deficit was 90 mL. The instrument and the hysteroscope were removed.  Adequate hemostasis was noted from the os, as well as the single-tooth tenaculum site.     She tolerated the procedure well. Instrument counts are correct. She was extubated, awakened, and taken  to recovery room in stable condition.       Dinh Syed MD  02/19/20  7:58 AM

## 2020-02-19 NOTE — DISCHARGE INSTRUCTIONS

## 2020-02-19 NOTE — ANESTHESIA PROCEDURE NOTES
Airway  Urgency: elective    Date/Time: 2/19/2020 7:40 AM  Airway not difficult    General Information and Staff    Patient location during procedure: OR  CRNA: Nicole Araiza CRNA    Indications and Patient Condition  Indications for airway management: airway protection    Preoxygenated: yes  Mask difficulty assessment: 1 - vent by mask    Final Airway Details  Final airway type: supraglottic airway      Successful airway: unique and I-gel  Size 3    Number of attempts at approach: 1  Assessment: lips, teeth, and gum same as pre-op

## 2020-02-19 NOTE — ANESTHESIA POSTPROCEDURE EVALUATION
Patient: Kisha Mosher    Procedure Summary     Date:  02/19/20 Room / Location:  [facility] OR  /  [facility] OR    Anesthesia Start:  0731 Anesthesia Stop:      Procedure:  DILATATION AND CURETTAGE HYSTEROSCOPY WITH MYOSURE (N/A Vagina) Diagnosis:       Cervical mass      (Cervical mass [N88.8])    Surgeon:  Dinh Syed MD Provider:  Nicole Araiza CRNA    Anesthesia Type:  general ASA Status:  2          Anesthesia Type: general    Vitals  Vitals Value Taken Time   /70 2/19/2020  8:16 AM   Temp 97.5 °F (36.4 °C) 2/19/2020  8:13 AM   Pulse 59 2/19/2020  8:17 AM   Resp 16 2/19/2020  8:13 AM   SpO2 99 % 2/19/2020  8:17 AM   Vitals shown include unvalidated device data.        Post Anesthesia Care and Evaluation    Patient location during evaluation: PACU  Patient participation: complete - patient participated  Level of consciousness: awake and alert  Pain management: adequate  Airway patency: patent  Anesthetic complications: No anesthetic complications    Cardiovascular status: acceptable  Respiratory status: acceptable  Hydration status: acceptable    Comments: Blood pressure 104/67, pulse 56, temperature 97.5 °F (36.4 °C), resp. rate 16, SpO2 100 %, not currently breastfeeding.    Pt discharged from PACU based on ajit score >8

## 2020-02-20 LAB
CYTO UR: NORMAL
LAB AP CASE REPORT: NORMAL
PATH REPORT.FINAL DX SPEC: NORMAL
PATH REPORT.GROSS SPEC: NORMAL

## 2020-03-06 ENCOUNTER — OFFICE VISIT (OUTPATIENT)
Dept: OBSTETRICS AND GYNECOLOGY | Facility: CLINIC | Age: 65
End: 2020-03-06

## 2020-03-06 VITALS
DIASTOLIC BLOOD PRESSURE: 90 MMHG | WEIGHT: 192 LBS | BODY MASS INDEX: 30.13 KG/M2 | SYSTOLIC BLOOD PRESSURE: 138 MMHG | HEIGHT: 67 IN

## 2020-03-06 DIAGNOSIS — N95.0 POSTMENOPAUSAL BLEEDING: Primary | ICD-10-CM

## 2020-03-06 DIAGNOSIS — Z09 POSTOP CHECK: ICD-10-CM

## 2020-03-06 PROBLEM — N88.8 CERVICAL MASS: Status: RESOLVED | Noted: 2019-01-10 | Resolved: 2020-03-06

## 2020-03-06 PROCEDURE — 99213 OFFICE O/P EST LOW 20 MIN: CPT | Performed by: OBSTETRICS & GYNECOLOGY

## 2020-03-06 NOTE — PROGRESS NOTES
"Kisha Mosher is a 64 y.o. female here today for follow up of postmenopausal bleeding related to a prolapsing cervical mass. She is here for a postop visit after undergoing hysteroscopy and polypectomy on 2/20.  She has been doing well postoperatively since her procedure and denies any fevers, pain, or vaginal bleeding.  Her pathology report returned showing a myomatous endometrial polyp.    /90 (BP Location: Left arm, Patient Position: Sitting)   Ht 170.2 cm (67\")   Wt 87.1 kg (192 lb)   BMI 30.07 kg/m²    In general pleasant female in no acute distress  Abdomen is soft nontender nondistended  Breathing unlabored    Assessment: Cervical mass, postmenopausal bleeding, normal postoperative visit after hysteroscopy with polypectomy    Kisha Mosher will return to normal activities without restriction.  We have discussed the benign pathology report and the findings of an otherwise normal appearing cavity.  In the meantime if she has questions or problems she will call the office.   " Russel miner [place] at [facility] left message on voicemail stating patient has been complaining of heartburn.  He's requesting order for Tums, he said patient has been on it before.

## 2020-03-18 DIAGNOSIS — R09.82 POSTNASAL DRIP: ICD-10-CM

## 2020-03-18 RX ORDER — AZELASTINE 1 MG/ML
SPRAY, METERED NASAL
Qty: 30 ML | Refills: 1 | Status: SHIPPED | OUTPATIENT
Start: 2020-03-18 | End: 2020-04-13

## 2020-03-19 RX ORDER — INDAPAMIDE 1.25 MG/1
1.25 TABLET, FILM COATED ORAL DAILY
Qty: 90 TABLET | Refills: 1 | Status: SHIPPED | OUTPATIENT
Start: 2020-03-19 | End: 2020-09-08

## 2020-03-19 NOTE — TELEPHONE ENCOUNTER
She can take just the indapamide.  But keep log of BP and call us in a week with readings. Please send indapamide to pharmacy.

## 2020-03-19 NOTE — TELEPHONE ENCOUNTER
PT SAW ENT & WAS ADVISED TO CHECK WITH PCP REGARDING LISINOPRIL AND COUGH ASSOCIATED WITH IT.  PT STATES SHE TOOK HERSELF OFF MEDICATION SHORTLY AFTER ENT APPT. STATES SHE HAS NO COUGH.  STATES HAS BP MONITOR AT HOME AND CHECKING BP'S DAILY.  RUNNING ANYWHERE AROUND 145-150/80'S.    PT STATES SHE RAN OUT OF INDAPAMIDE COUPLE MONTHS AGO.        PLEASE ADVISE IF PT CAN TAKE INDAPAMIDE ONLY OR DOES SHE NEED TO TAKE IT IN ADDITION TO ANOTHER BP MED.        [facility]

## 2020-03-27 ENCOUNTER — TELEPHONE (OUTPATIENT)
Dept: FAMILY MEDICINE CLINIC | Facility: CLINIC | Age: 65
End: 2020-03-27

## 2020-03-27 DIAGNOSIS — I10 ESSENTIAL HYPERTENSION: Primary | ICD-10-CM

## 2020-03-27 RX ORDER — AMLODIPINE BESYLATE 2.5 MG/1
2.5 TABLET ORAL DAILY
Qty: 30 TABLET | Refills: 0 | Status: SHIPPED | OUTPATIENT
Start: 2020-03-27 | End: 2020-04-20

## 2020-03-27 NOTE — TELEPHONE ENCOUNTER
PT BROUGHT BP READINGS      03/20 135/82  150/88  03/21 133/86  128/83  03/22 142/90  145/83  03/23 133/87  136/85  03/24 140/80  124/67  03/25 133/82  131/79  03/26 135/80  137/79  03/27 137/86  131/82      PT WAS ADVISED READINGS OK-WILL ADD NEW BP MEDICATION-RX SENT TO PHARMACY.  RECORD READINGS X 1 WK & CALL OFFICE WITH RESULTS.  VERBALIZED UNDERSTANDING.

## 2020-04-12 DIAGNOSIS — R09.82 POSTNASAL DRIP: ICD-10-CM

## 2020-04-13 RX ORDER — AZELASTINE 1 MG/ML
SPRAY, METERED NASAL
Qty: 1 EACH | Refills: 2 | Status: SHIPPED | OUTPATIENT
Start: 2020-04-13 | End: 2020-07-08

## 2020-04-19 DIAGNOSIS — I10 ESSENTIAL HYPERTENSION: ICD-10-CM

## 2020-04-20 RX ORDER — AMLODIPINE BESYLATE 2.5 MG/1
TABLET ORAL
Qty: 90 TABLET | Refills: 2 | Status: SHIPPED | OUTPATIENT
Start: 2020-04-20 | End: 2021-01-15

## 2020-04-22 RX ORDER — FLUTICASONE PROPIONATE 50 MCG
1 SPRAY, SUSPENSION (ML) NASAL 2 TIMES DAILY
Qty: 16 ML | Refills: 3 | OUTPATIENT
Start: 2020-04-22

## 2020-05-07 ENCOUNTER — OFFICE VISIT (OUTPATIENT)
Dept: FAMILY MEDICINE CLINIC | Facility: CLINIC | Age: 65
End: 2020-05-07

## 2020-05-07 VITALS
OXYGEN SATURATION: 97 % | SYSTOLIC BLOOD PRESSURE: 141 MMHG | TEMPERATURE: 98.4 F | WEIGHT: 190.6 LBS | BODY MASS INDEX: 29.91 KG/M2 | HEART RATE: 76 BPM | DIASTOLIC BLOOD PRESSURE: 84 MMHG | HEIGHT: 67 IN

## 2020-05-07 DIAGNOSIS — J30.2 SEASONAL ALLERGIC RHINITIS, UNSPECIFIED TRIGGER: Primary | ICD-10-CM

## 2020-05-07 PROCEDURE — 99213 OFFICE O/P EST LOW 20 MIN: CPT | Performed by: NURSE PRACTITIONER

## 2020-05-07 RX ORDER — PREDNISONE 10 MG/1
TABLET ORAL
Qty: 21 TABLET | Refills: 0 | Status: SHIPPED | OUTPATIENT
Start: 2020-05-07 | End: 2020-07-13

## 2020-05-07 NOTE — PROGRESS NOTES
CC: allergies, eyes watering    History:  Kisha Mosher is a 65 y.o. female who presents today for evaluation of the above problems.   Eyes have been itching and watering for several weeks.  Taking zyrtec and astelin.  Has been taking benadryl and using flonase intermittently as well.  No cough, sneezing, shortness of breath, wheezing or fever.      HPI  ROS:  Review of Systems   Constitutional: Negative for fever.   HENT: Negative for congestion, postnasal drip, sinus pressure, sinus pain and sore throat.    Eyes: Positive for discharge (watery), redness and itching.   Respiratory: Negative for cough, shortness of breath and wheezing.        No Known Allergies  Past Medical History:   Diagnosis Date   • Essential hypertension 2019   • Fibroid uterus    • Glaucoma    • Hyperlipidemia      Past Surgical History:   Procedure Laterality Date   •  SECTION     • COLONOSCOPY  2016    [facility] [place] [address] TRUJILLO   • D&C HYSTEROSCOPY MYOSURE N/A 2020    Procedure: DILATATION AND CURETTAGE HYSTEROSCOPY WITH MYOSURE;  Surgeon: Dinh Syed MD;  Location: [facility];  Service: Obstetrics/Gynecology;  Laterality: N/A;   • EYE SURGERY Right    • JOINT REPLACEMENT     • TOTAL KNEE ARTHROPLASTY Right 2017   • TUBAL ABDOMINAL LIGATION       Family History   Problem Relation Age of Onset   • Breast cancer Sister    • Alzheimer's disease Mother    • Hypertension Mother    • No Known Problems Father    • No Known Problems Daughter    • No Known Problems Son    • No Known Problems Maternal Grandmother    • No Known Problems Maternal Grandfather    • No Known Problems Paternal Grandmother    • No Known Problems Paternal Grandfather    • Breast cancer Sister    • No Known Problems Daughter    • No Known Problems Son    • No Known Problems Son       reports that she has never smoked. She has never used smokeless tobacco. She reports that she does not drink alcohol or use drugs.      Current Outpatient  "Medications:   •  amLODIPine (NORVASC) 2.5 MG tablet, TAKE 1 TABLET BY MOUTH EVERY DAY, Disp: 90 tablet, Rfl: 2  •  atorvastatin (LIPITOR) 20 MG tablet, Take 1 tablet by mouth Daily., Disp: 90 tablet, Rfl: 3  •  azelastine (ASTELIN) 0.1 % nasal spray, USE 2 SPRAYS IN EACH NOSTRIL TWICE A DAY USE AS DIRECTED, Disp: 1 each, Rfl: 2  •  cetirizine (zyrTEC) 10 MG tablet, Take 10 mg by mouth Daily., Disp: , Rfl:   •  indapamide (LOZOL) 1.25 MG tablet, Take 1 tablet by mouth Daily., Disp: 90 tablet, Rfl: 1  •  lisinopril (PRINIVIL,ZESTRIL) 5 MG tablet, Take 1 at bedtime for blood pressure, Disp: 90 tablet, Rfl: 3  •  predniSONE (DELTASONE) 10 MG (21) tablet pack, Use as directed on package, Disp: 21 tablet, Rfl: 0    OBJECTIVE:  /84 (BP Location: Left arm, Patient Position: Sitting, Cuff Size: Adult)   Pulse 76   Temp 98.4 °F (36.9 °C) (Temporal)   Ht 170.2 cm (67\")   Wt 86.5 kg (190 lb 9.6 oz)   SpO2 97%   Breastfeeding No   BMI 29.85 kg/m²    Physical Exam   Constitutional: She is oriented to person, place, and time. Vital signs are normal. She appears well-developed and well-nourished.   HENT:   Right Ear: Tympanic membrane, external ear and ear canal normal.   Left Ear: Tympanic membrane, external ear and ear canal normal.   Mouth/Throat: Uvula is midline.   Cobblestoning of posterior oropharynx   Eyes: Conjunctivae, EOM and lids are normal.   Cardiovascular: Normal rate and regular rhythm.   Pulmonary/Chest: Effort normal and breath sounds normal.   Neurological: She is alert and oriented to person, place, and time.   Psychiatric: She has a normal mood and affect. Her behavior is normal.   Vitals reviewed.      Assessment/Plan    Kisha was seen today for allergies and eye drainage.    Diagnoses and all orders for this visit:    Seasonal allergic rhinitis, unspecified trigger  -     predniSONE (DELTASONE) 10 MG (21) tablet pack; Use as directed on package    Switch from zyrtec to claritin, allegra, or " xyzal.  May also use Benadryl.  Confinue astelin and flonase.  Use [employer] Opcon A eye drops OTC.     Patient was masked upon entering facility.  I wore N95 mask, with face shield, gown, and gloves.  MA wore N95 mask with face shield, gown and gloves.       An After Visit Summary was printed and given to the patient at discharge.  Return if symptoms worsen or fail to improve, for Next scheduled follow up.       IVETT Francis 05/07/2020    Electronically signed.

## 2020-05-28 RX ORDER — FLUTICASONE PROPIONATE 50 MCG
1 SPRAY, SUSPENSION (ML) NASAL 2 TIMES DAILY
Qty: 16 ML | Refills: 0 | Status: SHIPPED | OUTPATIENT
Start: 2020-05-28 | End: 2020-06-22 | Stop reason: SDUPTHER

## 2020-06-22 RX ORDER — FLUTICASONE PROPIONATE 50 MCG
SPRAY, SUSPENSION (ML) NASAL
Qty: 16 ML | Refills: 2 | Status: SHIPPED | OUTPATIENT
Start: 2020-06-22

## 2020-07-08 DIAGNOSIS — R09.82 POSTNASAL DRIP: ICD-10-CM

## 2020-07-08 RX ORDER — AZELASTINE 1 MG/ML
SPRAY, METERED NASAL
Qty: 30 ML | Refills: 2 | Status: SHIPPED | OUTPATIENT
Start: 2020-07-08 | End: 2020-10-05

## 2020-07-13 ENCOUNTER — OFFICE VISIT (OUTPATIENT)
Dept: FAMILY MEDICINE CLINIC | Facility: CLINIC | Age: 65
End: 2020-07-13

## 2020-07-13 ENCOUNTER — TELEPHONE (OUTPATIENT)
Dept: FAMILY MEDICINE CLINIC | Facility: CLINIC | Age: 65
End: 2020-07-13

## 2020-07-13 DIAGNOSIS — J30.9 CHRONIC ALLERGIC RHINITIS: Primary | ICD-10-CM

## 2020-07-13 DIAGNOSIS — J01.90 ACUTE NON-RECURRENT SINUSITIS, UNSPECIFIED LOCATION: ICD-10-CM

## 2020-07-13 PROCEDURE — 99441 PR PHYS/QHP TELEPHONE EVALUATION 5-10 MIN: CPT | Performed by: NURSE PRACTITIONER

## 2020-07-13 RX ORDER — LEVOCETIRIZINE DIHYDROCHLORIDE 5 MG/1
5 TABLET, FILM COATED ORAL DAILY
COMMUNITY
Start: 2020-06-28

## 2020-07-13 RX ORDER — AZITHROMYCIN 250 MG/1
TABLET, FILM COATED ORAL
Qty: 6 TABLET | Refills: 0 | Status: SHIPPED | OUTPATIENT
Start: 2020-07-13 | End: 2020-08-21

## 2020-07-13 RX ORDER — AZELASTINE HYDROCHLORIDE 0.5 MG/ML
SOLUTION/ DROPS OPHTHALMIC 2 TIMES DAILY PRN
COMMUNITY
Start: 2020-06-28 | End: 2022-01-17 | Stop reason: SDUPTHER

## 2020-07-13 RX ORDER — PREDNISONE 10 MG/1
TABLET ORAL
Qty: 21 TABLET | Refills: 0 | Status: SHIPPED | OUTPATIENT
Start: 2020-07-13 | End: 2020-08-21

## 2020-07-13 NOTE — PROGRESS NOTES
CC: allergies    History:  Kisha Mosher is a 65 y.o. female who presents today for evaluation of the above problems.      URI    This is a chronic problem. The current episode started more than 1 month ago (Since at least May). The problem has been gradually worsening. There has been no fever. Associated symptoms include headaches, rhinorrhea and a sore throat. Associated symptoms comments: Postnasal drainage, eye redness, eye drainage. She has tried antihistamine for the symptoms. The treatment provided no relief.     ROS:  Review of Systems   Constitutional: Negative for fever.   HENT: Positive for postnasal drip, rhinorrhea and sore throat.    Eyes: Positive for discharge.   Neurological: Positive for headaches.       No Known Allergies  Past Medical History:   Diagnosis Date   • Essential hypertension 2019   • Fibroid uterus    • Glaucoma    • Hyperlipidemia      Past Surgical History:   Procedure Laterality Date   •  SECTION     • [place]ONOSCOPY  2016    [facility] [place]   • D&C HYSTEROSCOPY MYOSURE N/A 2020    Procedure: DILATATION AND CURETTAGE HYSTEROSCOPY WITH MYOSURE;  Surgeon: Dinh Syed MD;  Location: [facility] [place];  Service: Obstetrics/Gynecology;  Laterality: N/A;   • EYE SURGERY Right    • JOINT REPLACEMENT     • TOTAL KNEE ARTHROPLASTY Right 2017   • TUBAL ABDOMINAL LIGATION       Family History   Problem Relation Age of Onset   • Breast cancer Sister    • Alzheimer's disease Mother    • Hypertension Mother    • No Known Problems Father    • No Known Problems Daughter    • No Known Problems Son    • No Known Problems Maternal Grandmother    • No Known Problems Maternal Grandfather    • No Known Problems Paternal Grandmother    • No Known Problems Paternal Grandfather    • Breast cancer Sister    • No Known Problems Daughter    • No Known Problems Son    • No Known Problems Son       reports that she has never smoked. She has never used smokeless tobacco. She  reports that she does not drink alcohol or use drugs.      Current Outpatient Medications:   •  amLODIPine (NORVASC) 2.5 MG tablet, TAKE 1 TABLET BY MOUTH EVERY DAY, Disp: 90 tablet, Rfl: 2  •  atorvastatin (LIPITOR) 20 MG tablet, Take 1 tablet by mouth Daily., Disp: 90 tablet, Rfl: 3  •  azelastine (ASTELIN) 0.1 % nasal spray, USE 2 SPRAYS IN EACH NOSTRIL TWICE A DAY USE AS DIRECTED, Disp: 30 mL, Rfl: 2  •  fluticasone (FLONASE) 50 MCG/ACT nasal spray, SPRAY 1 SPRAY INTO EACH NOSTRIL TWICE A DAY, Disp: 16 mL, Rfl: 2  •  indapamide (LOZOL) 1.25 MG tablet, Take 1 tablet by mouth Daily., Disp: 90 tablet, Rfl: 1  •  azelastine (OPTIVAR) 0.05 % ophthalmic solution, INSTILL 1 DROP INTO BOTH EYES EVERY DAY, Disp: , Rfl:   •  azithromycin (Zithromax) 250 MG tablet, Take 2 tablets the first day, then 1 tablet daily for 4 days., Disp: 6 tablet, Rfl: 0  •  levocetirizine (XYZAL) 5 MG tablet, Take 5 mg by mouth Daily., Disp: , Rfl:   •  predniSONE (DELTASONE) 10 MG (21) tablet pack, Use as directed on package, Disp: 21 tablet, Rfl: 0    OBJECTIVE:  Breastfeeding No    Physical Exam   Psychiatric: She has a normal mood and affect. Her speech is normal.       Assessment/Plan    Kisha was seen today for sinusitis.    Diagnoses and all orders for this visit:    Chronic allergic rhinitis  -     Ambulatory Referral to ENT (Otolaryngology)  -     predniSONE (DELTASONE) 10 MG (21) tablet pack; Use as directed on package    Acute non-recurrent sinusitis, unspecified location  -     azithromycin (Zithromax) 250 MG tablet; Take 2 tablets the first day, then 1 tablet daily for 4 days.    This visit has been rescheduled as a phone visit to comply with patient safety concerns in accordance with CDC recommendations. Total time of discussion was 5 minutes.        An After Visit Summary was printed and given to the patient at discharge.  Return if symptoms worsen or fail to improve, for Next scheduled follow up.       IVETT Francis  07/13/2020    Electronically signed.

## 2020-07-16 ENCOUNTER — TELEPHONE (OUTPATIENT)
Dept: OTOLARYNGOLOGY | Facility: CLINIC | Age: 65
End: 2020-07-16

## 2020-07-21 ENCOUNTER — TELEPHONE (OUTPATIENT)
Dept: OTOLARYNGOLOGY | Facility: CLINIC | Age: 65
End: 2020-07-21

## 2020-08-19 ENCOUNTER — TELEPHONE (OUTPATIENT)
Dept: OTOLARYNGOLOGY | Facility: CLINIC | Age: 65
End: 2020-08-19

## 2020-08-19 NOTE — TELEPHONE ENCOUNTER
Patient called stating she was feeling better and doesn't need to be seen in the office at this time. Will call back if anything changes.

## 2020-08-21 ENCOUNTER — OFFICE VISIT (OUTPATIENT)
Dept: FAMILY MEDICINE CLINIC | Facility: CLINIC | Age: 65
End: 2020-08-21

## 2020-08-21 VITALS
BODY MASS INDEX: 30.54 KG/M2 | WEIGHT: 194.6 LBS | RESPIRATION RATE: 16 BRPM | TEMPERATURE: 97.1 F | SYSTOLIC BLOOD PRESSURE: 130 MMHG | HEART RATE: 83 BPM | DIASTOLIC BLOOD PRESSURE: 92 MMHG | OXYGEN SATURATION: 98 % | HEIGHT: 67 IN

## 2020-08-21 DIAGNOSIS — M25.561 PAIN AND SWELLING OF RIGHT KNEE: Primary | ICD-10-CM

## 2020-08-21 DIAGNOSIS — M25.461 PAIN AND SWELLING OF RIGHT KNEE: Primary | ICD-10-CM

## 2020-08-21 PROCEDURE — 99213 OFFICE O/P EST LOW 20 MIN: CPT | Performed by: FAMILY MEDICINE

## 2020-08-21 NOTE — PROGRESS NOTES
Subjective   Kisha Mosher is a 65 y.o. female.     65-year-old female with pain right knee    Pain   This is a chronic problem. The current episode started more than 1 month ago. The problem occurs every several days. The problem has been waxing and waning. Associated symptoms include arthralgias and joint swelling. Associated symptoms comments: DJD right knee-remote knee replacement- back to orthopedic surgeon has nothing to offer except redo. The symptoms are aggravated by walking and standing. She has tried nothing for the symptoms.       The following portions of the patient's history were reviewed and updated as appropriate: allergies, current medications, past family history, past medical history, past social history, past surgical history and problem list.    Review of Systems   Musculoskeletal: Positive for arthralgias and joint swelling.        Right knee pain       Objective   Physical Exam   Constitutional: She is oriented to person, place, and time.   Mild obesity   Musculoskeletal: She exhibits edema, tenderness and deformity.   Effusion right knee warm swollen-surgical scar well-healed-remote from knee replacement   Neurological: She is alert and oriented to person, place, and time.   Skin: Skin is warm and dry.   Psychiatric: She has a normal mood and affect. Her behavior is normal. Judgment and thought content normal.   Nursing note and vitals reviewed.      Assessment/Plan   Kisha was seen today for knee pain.    Diagnoses and all orders for this visit:    Pain and swelling of right knee       Plan Aleve 2 twice daily needs periodic rest-cannot work 8 hours a day 7 days a week without intermittent rest

## 2020-09-08 RX ORDER — INDAPAMIDE 1.25 MG/1
TABLET, FILM COATED ORAL
Qty: 90 TABLET | Refills: 1 | Status: SHIPPED | OUTPATIENT
Start: 2020-09-08 | End: 2021-03-08

## 2020-10-04 DIAGNOSIS — R09.82 POSTNASAL DRIP: ICD-10-CM

## 2020-10-05 ENCOUNTER — OFFICE VISIT (OUTPATIENT)
Dept: FAMILY MEDICINE CLINIC | Facility: CLINIC | Age: 65
End: 2020-10-05

## 2020-10-05 VITALS
BODY MASS INDEX: 30.98 KG/M2 | WEIGHT: 197.4 LBS | HEART RATE: 81 BPM | HEIGHT: 67 IN | TEMPERATURE: 97.2 F | OXYGEN SATURATION: 98 % | DIASTOLIC BLOOD PRESSURE: 80 MMHG | SYSTOLIC BLOOD PRESSURE: 122 MMHG | RESPIRATION RATE: 16 BRPM

## 2020-10-05 DIAGNOSIS — R52 PAIN: Primary | ICD-10-CM

## 2020-10-05 PROCEDURE — 99213 OFFICE O/P EST LOW 20 MIN: CPT | Performed by: FAMILY MEDICINE

## 2020-10-05 RX ORDER — AZELASTINE 1 MG/ML
SPRAY, METERED NASAL
Qty: 90 EACH | Refills: 0 | Status: SHIPPED | OUTPATIENT
Start: 2020-10-05 | End: 2021-01-05

## 2020-10-05 RX ORDER — NAPROXEN SODIUM 220 MG
440 TABLET ORAL 2 TIMES DAILY PRN
COMMUNITY
End: 2021-07-13

## 2020-10-05 NOTE — PROGRESS NOTES
Subjective   Kisha Mosher is a 65 y.o. female.     65-year-old female with pain and swelling right knee-replacement 4 years ago    Pain  This is a recurrent problem. The current episode started 1 to 4 weeks ago. The problem occurs intermittently. The problem has been unchanged. Associated symptoms include arthralgias. Associated symptoms comments: Right knee replacement-Dr. Ramey-2016. Exacerbated by: Working on concrete standing. Treatments tried: Anti-inflammatories. The treatment provided mild relief.       The following portions of the patient's history were reviewed and updated as appropriate: allergies, current medications, past family history, past medical history, past social history, past surgical history and problem list.    Review of Systems   Cardiovascular: Positive for leg swelling.   Musculoskeletal: Positive for arthralgias.        Right knee replacement       Objective   Physical Exam  Vitals signs and nursing note reviewed.   Constitutional:       Appearance: Normal appearance.   Cardiovascular:      Rate and Rhythm: Normal rate and regular rhythm.   Pulmonary:      Effort: Pulmonary effort is normal.      Breath sounds: Normal breath sounds.   Musculoskeletal:         General: Swelling and tenderness present.      Comments: Effusion right knee with surgical scar consistent with replacement-warm low-grade and low-grade inflammation   Skin:     General: Skin is warm.   Neurological:      General: No focal deficit present.      Mental Status: She is alert and oriented to person, place, and time.   Psychiatric:         Mood and Affect: Mood normal.         Behavior: Behavior normal.         Thought Content: Thought content normal.         Judgment: Judgment normal.         Assessment/Plan   Kisha was seen today for joint swelling.    Diagnoses and all orders for this visit:    Pain         Plan above-continue conservative care- need to inject now with prosthesis present

## 2020-12-11 RX ORDER — ATORVASTATIN CALCIUM 20 MG/1
TABLET, FILM COATED ORAL
Qty: 90 TABLET | Refills: 0 | Status: SHIPPED | OUTPATIENT
Start: 2020-12-11 | End: 2021-03-03

## 2021-01-05 DIAGNOSIS — R09.82 POSTNASAL DRIP: ICD-10-CM

## 2021-01-05 RX ORDER — AZELASTINE HYDROCHLORIDE 137 UG/1
SPRAY, METERED NASAL
Qty: 90 SPRAY | Refills: 0 | Status: SHIPPED | OUTPATIENT
Start: 2021-01-05 | End: 2022-06-20 | Stop reason: SDUPTHER

## 2021-01-07 ENCOUNTER — OFFICE VISIT (OUTPATIENT)
Dept: FAMILY MEDICINE CLINIC | Facility: CLINIC | Age: 66
End: 2021-01-07

## 2021-01-07 VITALS
OXYGEN SATURATION: 98 % | DIASTOLIC BLOOD PRESSURE: 78 MMHG | HEIGHT: 67 IN | HEART RATE: 72 BPM | WEIGHT: 198 LBS | BODY MASS INDEX: 31.08 KG/M2 | TEMPERATURE: 96.4 F | SYSTOLIC BLOOD PRESSURE: 126 MMHG | RESPIRATION RATE: 14 BRPM

## 2021-01-07 DIAGNOSIS — Z00.00 ANNUAL PHYSICAL EXAM: Primary | ICD-10-CM

## 2021-01-07 DIAGNOSIS — Z12.4 SCREENING FOR MALIGNANT NEOPLASM OF THE CERVIX: ICD-10-CM

## 2021-01-07 DIAGNOSIS — Z23 NEED FOR PROPHYLACTIC VACCINATION AND INOCULATION AGAINST INFLUENZA: ICD-10-CM

## 2021-01-07 DIAGNOSIS — I10 ESSENTIAL HYPERTENSION: ICD-10-CM

## 2021-01-07 LAB
BILIRUB BLD-MCNC: NEGATIVE MG/DL
CLARITY, POC: CLEAR
COLOR UR: YELLOW
GLUCOSE UR STRIP-MCNC: NEGATIVE MG/DL
KETONES UR QL: NEGATIVE
LEUKOCYTE EST, POC: NEGATIVE
NITRITE UR-MCNC: NEGATIVE MG/ML
PH UR: 6.5 [PH] (ref 5–8)
PROT UR STRIP-MCNC: NEGATIVE MG/DL
RBC # UR STRIP: NEGATIVE /UL
SP GR UR: 1.03 (ref 1–1.03)
UROBILINOGEN UR QL: NORMAL

## 2021-01-07 PROCEDURE — 99396 PREV VISIT EST AGE 40-64: CPT | Performed by: FAMILY MEDICINE

## 2021-01-07 PROCEDURE — 81003 URINALYSIS AUTO W/O SCOPE: CPT | Performed by: FAMILY MEDICINE

## 2021-01-07 NOTE — PROGRESS NOTES
Subjective   Kisha Mosher is a 65 y.o. female.     65-year-old female with a history of hypertension hyperlipidemia    Hyperlipidemia  This is a chronic problem. The current episode started more than 1 year ago. The problem is controlled. Recent lipid tests were reviewed and are variable. Pertinent negatives include no chest pain. Current antihyperlipidemic treatment includes diet change and statins. The current treatment provides moderate improvement of lipids. Compliance problems include adherence to diet and adherence to exercise.  Risk factors for coronary artery disease include dyslipidemia, hypertension, obesity, post-menopausal and a sedentary lifestyle.        The following portions of the patient's history were reviewed and updated as appropriate: allergies, current medications, past family history, past medical history, past social history, past surgical history and problem list.    Review of Systems   Cardiovascular: Negative for chest pain and leg swelling.   Gastrointestinal:        Colonoscopy last year   Genitourinary:        No more vaginal bleeding   Musculoskeletal: Positive for arthralgias.   All other systems reviewed and are negative.      Objective   Physical Exam  Vitals signs and nursing note reviewed. Exam conducted with a chaperone present.   Constitutional:       Appearance: She is obese.   HENT:      Right Ear: Tympanic membrane and ear canal normal.      Left Ear: Tympanic membrane and ear canal normal.   Eyes:      Extraocular Movements: Extraocular movements intact.      Pupils: Pupils are equal, round, and reactive to light.   Neck:      Vascular: No carotid bruit.   Cardiovascular:      Rate and Rhythm: Normal rate and regular rhythm.      Pulses: Normal pulses.      Heart sounds: Normal heart sounds.   Pulmonary:      Effort: Pulmonary effort is normal.      Breath sounds: Normal breath sounds.      Comments: Breast no masses noted  Abdominal:      General: Abdomen is flat.       Palpations: Abdomen is soft. There is no mass.      Hernia: No hernia is present.   Genitourinary:     Comments: Cervix present Pap smear taken uterus normal size no adnexal masses  Musculoskeletal:      Right lower leg: No edema.      Left lower leg: No edema.   Lymphadenopathy:      Cervical: No cervical adenopathy.   Skin:     General: Skin is warm and dry.      Capillary Refill: Capillary refill takes less than 2 seconds.   Neurological:      General: No focal deficit present.      Mental Status: She is alert and oriented to person, place, and time.   Psychiatric:         Mood and Affect: Mood normal.         Behavior: Behavior normal.         Thought Content: Thought content normal.         Judgment: Judgment normal.         Assessment/Plan   Problems Addressed this Visit        Cardiac and Vasculature    Essential hypertension    Relevant Orders    POC Urinalysis Dipstick, Multipro      Other Visit Diagnoses     Annual physical exam    -  Primary    Relevant Orders    TSH    T4, free    CBC & Differential    Comprehensive Metabolic Panel    Lipid Panel    Need for prophylactic vaccination and inoculation against influenza          Diagnoses       Codes Comments    Annual physical exam    -  Primary ICD-10-CM: Z00.00  ICD-9-CM: V70.0     Essential hypertension     ICD-10-CM: I10  ICD-9-CM: 401.9     Need for prophylactic vaccination and inoculation against influenza     ICD-10-CM: Z23  ICD-9-CM: V04.81           Plan above-COVID-19 vaccine advised weight reduction

## 2021-01-08 LAB
ALBUMIN SERPL-MCNC: 4.4 G/DL (ref 3.5–5.2)
ALBUMIN/GLOB SERPL: 1.5 G/DL
ALP SERPL-CCNC: 117 U/L (ref 39–117)
ALT SERPL-CCNC: 22 U/L (ref 1–33)
AST SERPL-CCNC: 22 U/L (ref 1–32)
BASOPHILS # BLD AUTO: 0.03 10*3/MM3 (ref 0–0.2)
BASOPHILS NFR BLD AUTO: 0.5 % (ref 0–1.5)
BILIRUB SERPL-MCNC: 0.6 MG/DL (ref 0–1.2)
BUN SERPL-MCNC: 13 MG/DL (ref 8–23)
BUN/CREAT SERPL: 21 (ref 7–25)
CALCIUM SERPL-MCNC: 9.6 MG/DL (ref 8.6–10.5)
CHLORIDE SERPL-SCNC: 102 MMOL/L (ref 98–107)
CHOLEST SERPL-MCNC: 164 MG/DL (ref 0–200)
CO2 SERPL-SCNC: 31.7 MMOL/L (ref 22–29)
CREAT SERPL-MCNC: 0.62 MG/DL (ref 0.57–1)
EOSINOPHIL # BLD AUTO: 0.09 10*3/MM3 (ref 0–0.4)
EOSINOPHIL NFR BLD AUTO: 1.6 % (ref 0.3–6.2)
ERYTHROCYTE [DISTWIDTH] IN BLOOD BY AUTOMATED COUNT: 12.9 % (ref 12.3–15.4)
GLOBULIN SER CALC-MCNC: 2.9 GM/DL
GLUCOSE SERPL-MCNC: 89 MG/DL (ref 65–99)
HCT VFR BLD AUTO: 46.8 % (ref 34–46.6)
HDLC SERPL-MCNC: 69 MG/DL (ref 40–60)
HGB BLD-MCNC: 15.5 G/DL (ref 12–15.9)
IMM GRANULOCYTES # BLD AUTO: 0.01 10*3/MM3 (ref 0–0.05)
IMM GRANULOCYTES NFR BLD AUTO: 0.2 % (ref 0–0.5)
LDLC SERPL CALC-MCNC: 83 MG/DL (ref 0–100)
LYMPHOCYTES # BLD AUTO: 2.33 10*3/MM3 (ref 0.7–3.1)
LYMPHOCYTES NFR BLD AUTO: 40.5 % (ref 19.6–45.3)
MCH RBC QN AUTO: 29.5 PG (ref 26.6–33)
MCHC RBC AUTO-ENTMCNC: 33.1 G/DL (ref 31.5–35.7)
MCV RBC AUTO: 89 FL (ref 79–97)
MONOCYTES # BLD AUTO: 0.41 10*3/MM3 (ref 0.1–0.9)
MONOCYTES NFR BLD AUTO: 7.1 % (ref 5–12)
NEUTROPHILS # BLD AUTO: 2.89 10*3/MM3 (ref 1.7–7)
NEUTROPHILS NFR BLD AUTO: 50.1 % (ref 42.7–76)
NRBC BLD AUTO-RTO: 0 /100 WBC (ref 0–0.2)
PLATELET # BLD AUTO: 250 10*3/MM3 (ref 140–450)
POTASSIUM SERPL-SCNC: 4.5 MMOL/L (ref 3.5–5.2)
PROT SERPL-MCNC: 7.3 G/DL (ref 6–8.5)
RBC # BLD AUTO: 5.26 10*6/MM3 (ref 3.77–5.28)
SODIUM SERPL-SCNC: 141 MMOL/L (ref 136–145)
T4 FREE SERPL-MCNC: 1.37 NG/DL (ref 0.93–1.7)
TRIGL SERPL-MCNC: 63 MG/DL (ref 0–150)
TSH SERPL DL<=0.005 MIU/L-ACNC: 2.9 UIU/ML (ref 0.27–4.2)
VLDLC SERPL CALC-MCNC: 12 MG/DL (ref 5–40)
WBC # BLD AUTO: 5.76 10*3/MM3 (ref 3.4–10.8)

## 2021-01-11 LAB
C TRACH RRNA CVX QL NAA+PROBE: NEGATIVE
CYTOLOGIST CVX/VAG CYTO: NORMAL
CYTOLOGY CVX/VAG DOC CYTO: NORMAL
CYTOLOGY CVX/VAG DOC THIN PREP: NORMAL
DX ICD CODE: NORMAL
HIV 1 & 2 AB SER-IMP: NORMAL
HPV I/H RISK 4 DNA CVX QL PROBE+SIG AMP: NEGATIVE
Lab: NORMAL
N GONORRHOEA RRNA CVX QL NAA+PROBE: NEGATIVE
OTHER STN SPEC: NORMAL
STAT OF ADQ CVX/VAG CYTO-IMP: NORMAL

## 2021-01-15 DIAGNOSIS — I10 ESSENTIAL HYPERTENSION: ICD-10-CM

## 2021-01-15 RX ORDER — AMLODIPINE BESYLATE 2.5 MG/1
TABLET ORAL
Qty: 90 TABLET | Refills: 3 | Status: SHIPPED | OUTPATIENT
Start: 2021-01-15 | End: 2022-01-26 | Stop reason: SDUPTHER

## 2021-01-26 ENCOUNTER — TELEPHONE (OUTPATIENT)
Dept: FAMILY MEDICINE CLINIC | Facility: CLINIC | Age: 66
End: 2021-01-26

## 2021-01-26 DIAGNOSIS — Z78.0 POST-MENOPAUSAL: ICD-10-CM

## 2021-01-26 DIAGNOSIS — Z12.4 SCREENING FOR MALIGNANT NEOPLASM OF THE CERVIX: Primary | ICD-10-CM

## 2021-01-26 NOTE — TELEPHONE ENCOUNTER
PATIENT CALLED STATING THAT SHE WAS TOLD THAT SHE WAS GOING TO GET A REFERRAL FOR A MAMOGRAM BUT SHE HASNT HEARD ANYTHING BACK YET. SHE WOULD LIKE TO SPEAK WITH DR PINON ABOUT IT.    GOOD CALL BACK:  [phone]

## 2021-02-19 ENCOUNTER — OFFICE VISIT (OUTPATIENT)
Dept: FAMILY MEDICINE CLINIC | Facility: CLINIC | Age: 66
End: 2021-02-19

## 2021-02-19 VITALS — TEMPERATURE: 97.5 F | BODY MASS INDEX: 32.32 KG/M2 | WEIGHT: 194 LBS | HEIGHT: 65 IN

## 2021-02-19 DIAGNOSIS — R05.9 COUGH: ICD-10-CM

## 2021-02-19 DIAGNOSIS — J02.9 SORE THROAT: ICD-10-CM

## 2021-02-19 DIAGNOSIS — Z20.822 EXPOSURE TO COVID-19 VIRUS: Primary | ICD-10-CM

## 2021-02-19 PROCEDURE — 99214 OFFICE O/P EST MOD 30 MIN: CPT | Performed by: FAMILY MEDICINE

## 2021-02-19 RX ORDER — AZITHROMYCIN 250 MG/1
TABLET, FILM COATED ORAL
Qty: 6 TABLET | Refills: 0 | Status: SHIPPED | OUTPATIENT
Start: 2021-02-19 | End: 2021-07-13

## 2021-02-19 NOTE — PROGRESS NOTES
Subjective   Kisha Mosher is a 65 y.o. female.     65-year-old female with for 5-day history of cough congestion sinus    Sinusitis  This is a new problem. The current episode started in the past 7 days. The problem is unchanged. There has been no fever. Associated symptoms include congestion, coughing, a hoarse voice and sinus pressure. Pertinent negatives include no shortness of breath. Past treatments include nothing.       The following portions of the patient's history were reviewed and updated as appropriate: allergies, current medications, past family history, past medical history, past social history, past surgical history and problem list.    Review of Systems   HENT: Positive for congestion, hoarse voice and sinus pressure.    Respiratory: Positive for cough. Negative for shortness of breath.    Cardiovascular: Negative for chest pain.   Gastrointestinal: Positive for nausea. Negative for diarrhea.       Objective   Physical Exam  Vitals signs and nursing note reviewed.   Constitutional:       Appearance: Normal appearance.   HENT:      Right Ear: Tympanic membrane and ear canal normal.      Left Ear: Tympanic membrane and ear canal normal.      Mouth/Throat:      Mouth: Mucous membranes are moist.      Pharynx: Oropharynx is clear.   Cardiovascular:      Rate and Rhythm: Normal rate and regular rhythm.      Pulses: Normal pulses.      Heart sounds: Normal heart sounds.   Pulmonary:      Effort: Pulmonary effort is normal.      Breath sounds: Normal breath sounds.   Musculoskeletal:      Right lower leg: No edema.      Left lower leg: No edema.   Neurological:      General: No focal deficit present.      Mental Status: She is alert and oriented to person, place, and time.   Psychiatric:         Mood and Affect: Mood normal.         Behavior: Behavior normal.         Assessment/Plan   Diagnoses and all orders for this visit:    1. Exposure to COVID-19 virus (Primary)  -     COVID-19,[facility] ROUTINE, NP/OP  SWAB IN TRANSPORT MEDIA OR ESWAB 72 HR TAT - Swab, Oropharynx    2. Sore throat  -     COVID-19,[facility] ROUTINE, NP/OP SWAB IN TRANSPORT MEDIA OR ESWAB 72 HR TAT - Swab, Oropharynx    3. Cough  -     COVID-19,[facility] ROUTINE, NP/OP SWAB IN TRANSPORT MEDIA OR ESWAB 72 HR TAT - Swab, Oropharynx    Other orders  -     azithromycin (Zithromax Z-Yovani) 250 MG tablet; Take 2 tablets the first day, then 1 tablet daily for 4 days.  Dispense: 6 tablet; Refill: 0         Plan-above-Covid PCR nasopharyngeal swab

## 2021-02-20 LAB — SARS-COV-2 RNA RESP QL NAA+PROBE: NOT DETECTED

## 2021-03-03 RX ORDER — ATORVASTATIN CALCIUM 20 MG/1
TABLET, FILM COATED ORAL
Qty: 90 TABLET | Refills: 2 | Status: SHIPPED | OUTPATIENT
Start: 2021-03-03 | End: 2021-11-30

## 2021-03-08 RX ORDER — INDAPAMIDE 1.25 MG/1
TABLET, FILM COATED ORAL
Qty: 90 TABLET | Refills: 2 | Status: SHIPPED | OUTPATIENT
Start: 2021-03-08 | End: 2021-11-30

## 2021-07-01 ENCOUNTER — TELEPHONE (OUTPATIENT)
Dept: FAMILY MEDICINE CLINIC | Facility: CLINIC | Age: 66
End: 2021-07-01

## 2021-07-01 NOTE — TELEPHONE ENCOUNTER
Caller: Kisha Mosher    Relationship: Self    Best call back number: [phone]     What orders are you requesting (i.e. lab or imaging): LABWORK    In what timeframe would the patient need to come in: BEFORE UPCOMING APPOINTMENT ON 07/13/2021    Where will you receive your lab/imaging services: LAB    Additional notes: PATIENT REQUESTING CALLBACK ONCE ORDERS ARE IN SO THAT SHE CAN COME IN TO HAVE LABS DONE BEFORE UPCOMING APPOINTMENT.

## 2021-07-13 ENCOUNTER — OFFICE VISIT (OUTPATIENT)
Dept: FAMILY MEDICINE CLINIC | Facility: CLINIC | Age: 66
End: 2021-07-13

## 2021-07-13 VITALS
WEIGHT: 189 LBS | BODY MASS INDEX: 31.49 KG/M2 | TEMPERATURE: 97.5 F | SYSTOLIC BLOOD PRESSURE: 120 MMHG | OXYGEN SATURATION: 97 % | DIASTOLIC BLOOD PRESSURE: 82 MMHG | HEART RATE: 75 BPM | HEIGHT: 65 IN | RESPIRATION RATE: 16 BRPM

## 2021-07-13 DIAGNOSIS — E78.2 MIXED HYPERLIPIDEMIA: Primary | ICD-10-CM

## 2021-07-13 PROCEDURE — 99213 OFFICE O/P EST LOW 20 MIN: CPT | Performed by: FAMILY MEDICINE

## 2021-07-13 RX ORDER — LATANOPROST 50 UG/ML
1 SOLUTION/ DROPS OPHTHALMIC
COMMUNITY
Start: 2021-06-14

## 2021-07-13 NOTE — PATIENT INSTRUCTIONS
Mediterranean Diet  A Mediterranean diet refers to food and lifestyle choices that are based on the traditions of countries located on the [place]. This way of eating has been shown to help prevent certain conditions and improve outcomes for people who have chronic diseases, like kidney disease and heart disease.  What are tips for following this plan?  Lifestyle  · Cook and eat meals together with your family, when possible.  · Drink enough fluid to keep your urine clear or pale yellow.  · Be physically active every day. This includes:  ? Aerobic exercise like running or swimming.  ? Leisure activities like gardening, walking, or housework.  · Get 7-8 hours of sleep each night.  · If recommended by your health care provider, drink red wine in moderation. This means 1 glass a day for nonpregnant women and 2 glasses a day for men. A glass of wine equals 5 oz (150 mL).  Reading food labels    · Check the serving size of packaged foods. For foods such as rice and pasta, the serving size refers to the amount of cooked product, not dry.  · Check the total fat in packaged foods. Avoid foods that have saturated fat or trans fats.  · Check the ingredients list for added sugars, such as corn syrup.  Shopping  · At the grocery store, buy most of your food from the areas near the walls of the store. This includes:  ? Fresh fruits and vegetables (produce).  ? Grains, beans, nuts, and seeds. Some of these may be available in unpackaged forms or large amounts (in bulk).  ? Fresh seafood.  ? Poultry and eggs.  ? Low-fat dairy products.  · Buy whole ingredients instead of prepackaged foods.  · Buy fresh fruits and vegetables in-season from local farmers markets.  · Buy frozen fruits and vegetables in resealable bags.  · If you do not have access to quality fresh seafood, buy precooked frozen shrimp or canned fish, such as tuna, salmon, or sardines.  · Buy small amounts of raw or cooked vegetables, salads, or olives from  the deli or salad bar at your store.  · Stock your pantry so you always have certain foods on hand, such as olive oil, canned tuna, canned tomatoes, rice, pasta, and beans.  Cooking  · Cook foods with extra-virgin olive oil instead of using butter or other vegetable oils.  · Have meat as a side dish, and have vegetables or grains as your main dish. This means having meat in small portions or adding small amounts of meat to foods like pasta or stew.  · Use beans or vegetables instead of meat in common dishes like chili or lasagna.  · [place] with different cooking methods. Try roasting or broiling vegetables instead of steaming or sautéeing them.  · Add frozen vegetables to soups, stews, pasta, or rice.  · Add nuts or seeds for added healthy fat at each meal. You can add these to yogurt, salads, or vegetable dishes.  · Marinate fish or vegetables using olive oil, lemon juice, garlic, and fresh herbs.  Meal planning    · Plan to eat 1 vegetarian meal one day each week. Try to work up to 2 vegetarian meals, if possible.  · Eat seafood 2 or more times a week.  · Have healthy snacks readily available, such as:  ? Vegetable sticks with hummus.  ? Greek yogurt.  ? Fruit and nut trail mix.  · Eat balanced meals throughout the week. This includes:  ? Fruit: 2-3 servings a day  ? Vegetables: 4-5 servings a day  ? Low-fat dairy: 2 servings a day  ? Fish, poultry, or lean meat: 1 serving a day  ? Beans and legumes: 2 or more servings a week  ? Nuts and seeds: 1-2 servings a day  ? Whole grains: 6-8 servings a day  ? Extra-virgin olive oil: 3-4 servings a day  · Limit red meat and sweets to only a few servings a month  What are my food choices?  · Mediterranean diet  ? Recommended  § Grains: Whole-grain pasta. Brown rice. Bulgar wheat. Polenta. Couscous. Whole-wheat bread. Oatmeal. Quinoa.  § Vegetables: Artichokes. Beets. Broccoli. Cabbage. Carrots. Eggplant. Green beans. Chard. Kale. Spinach. Onions. Leeks. Peas. Squash.  Tomatoes. Peppers. Radishes.  § Fruits: Apples. Apricots. Avocado. Berries. Bananas. Cherries. Dates. Figs. Grapes. Julio. Melon. Oranges. Peaches. Plums. Pomegranate.  § Meats and other protein foods: Beans. Almonds. Sunflower seeds. Pine nuts. Peanuts. Cod. [place]. Scallops. Shrimp. Tuna. Tilapia. Clams. Oysters. Eggs.  § Dairy: Low-fat milk. Cheese. Greek yogurt.  § Beverages: Water. Red wine. Herbal tea.  § Fats and oils: Extra virgin olive oil. Avocado oil. Grape seed oil.  § Sweets and desserts: Greek yogurt with honey. Baked apples. Poached pears. Trail mix.  § Seasoning and other foods: Basil. Cilantro. Coriander. Cumin. Mint. Parsley. [place]. Rosemary. Tarragon. Garlic. Oregano. Thyme. Pepper. Balsalmic vinegar. Tahini. Hummus. Tomato sauce. Olives. Mushrooms.  ? Limit these  § Grains: Prepackaged pasta or rice dishes. Prepackaged cereal with added sugar.  § Vegetables: Deep fried potatoes (french fries).  § Fruits: Fruit canned in syrup.  § Meats and other protein foods: Beef. Pork. Lamb. Poultry with skin. Hot dogs. Paris[place].  § Dairy: Ice cream. Sour cream. Whole milk.  § Beverages: Juice. Sugar-sweetened soft drinks. Beer. Liquor and spirits.  § Fats and oils: Butter. Canola oil. Vegetable oil. Beef fat (tallow). Lard.  § Sweets and desserts: Cookies. Cakes. Pies. Candy.  § Seasoning and other foods: Mayonnaise. Premade sauces and marinades.  The items listed may not be a complete list. Talk with your dietitian about what dietary choices are right for you.  Summary  · The Mediterranean diet includes both food and lifestyle choices.  · Eat a variety of fresh fruits and vegetables, beans, nuts, seeds, and whole grains.  · Limit the amount of red meat and sweets that you eat.  · Talk with your health care provider about whether it is safe for you to drink red wine in moderation. This means 1 glass a day for nonpregnant women and 2 glasses a day for men. A glass of wine equals 5 oz (150 mL).  This information  is not intended to replace advice given to you by your health care provider. Make sure you discuss any questions you have with your health care provider.  Document Revised: 08/17/2017 Document Reviewed: 08/10/2017  Elsevier Patient Education © 2020 [employer]

## 2021-07-13 NOTE — PROGRESS NOTES
Subjective   Kisha Mosher is a 66 y.o. female.     66-year-old female with history of hyperlipidemia      The following portions of the patient's history were reviewed and updated as appropriate: allergies, current medications, past family history, past medical history, past social history, past surgical history and problem list.    Review of Systems   Respiratory: Negative for shortness of breath.    Cardiovascular: Negative for chest pain and leg swelling.   Musculoskeletal: Positive for arthralgias.       Objective   Physical Exam  Vitals and nursing note reviewed.   Constitutional:       Appearance: She is obese.   Cardiovascular:      Rate and Rhythm: Normal rate and regular rhythm.      Pulses: Normal pulses.      Heart sounds: Normal heart sounds.   Pulmonary:      Effort: Pulmonary effort is normal.      Breath sounds: Normal breath sounds.   Abdominal:      General: Abdomen is flat.      Palpations: Abdomen is soft. There is no mass.      Tenderness: There is no abdominal tenderness.   Musculoskeletal:      Right lower leg: No edema.      Left lower leg: No edema.   Neurological:      General: No focal deficit present.      Mental Status: She is alert and oriented to person, place, and time.   Psychiatric:         Mood and Affect: Mood normal.         Behavior: Behavior normal.         Thought Content: Thought content normal.         Judgment: Judgment normal.         Assessment/Plan   Diagnoses and all orders for this visit:    1. Mixed hyperlipidemia (Primary)  -     Comprehensive Metabolic Panel  -     Lipid Panel       .Vaccinations i.e. Materna-Mediterranean diet losing weight cut out carbs

## 2021-07-14 LAB
ALBUMIN SERPL-MCNC: 4.7 G/DL (ref 3.5–5.2)
ALBUMIN/GLOB SERPL: 2 G/DL
ALP SERPL-CCNC: 111 U/L (ref 39–117)
ALT SERPL-CCNC: 28 U/L (ref 1–33)
AST SERPL-CCNC: 24 U/L (ref 1–32)
BILIRUB SERPL-MCNC: 0.6 MG/DL (ref 0–1.2)
BUN SERPL-MCNC: 16 MG/DL (ref 8–23)
BUN/CREAT SERPL: 18.8 (ref 7–25)
CALCIUM SERPL-MCNC: 9.9 MG/DL (ref 8.6–10.5)
CHLORIDE SERPL-SCNC: 100 MMOL/L (ref 98–107)
CHOLEST SERPL-MCNC: 177 MG/DL (ref 0–200)
CO2 SERPL-SCNC: 28.9 MMOL/L (ref 22–29)
CREAT SERPL-MCNC: 0.85 MG/DL (ref 0.57–1)
GLOBULIN SER CALC-MCNC: 2.4 GM/DL
GLUCOSE SERPL-MCNC: 97 MG/DL (ref 65–99)
HDLC SERPL-MCNC: 66 MG/DL (ref 40–60)
LDLC SERPL CALC-MCNC: 94 MG/DL (ref 0–100)
POTASSIUM SERPL-SCNC: 4.7 MMOL/L (ref 3.5–5.2)
PROT SERPL-MCNC: 7.1 G/DL (ref 6–8.5)
SODIUM SERPL-SCNC: 140 MMOL/L (ref 136–145)
TRIGL SERPL-MCNC: 92 MG/DL (ref 0–150)
VLDLC SERPL CALC-MCNC: 17 MG/DL (ref 5–40)

## 2021-10-06 ENCOUNTER — OFFICE VISIT (OUTPATIENT)
Dept: FAMILY MEDICINE CLINIC | Facility: CLINIC | Age: 66
End: 2021-10-06

## 2021-10-06 VITALS
OXYGEN SATURATION: 93 % | HEIGHT: 65 IN | WEIGHT: 182.6 LBS | RESPIRATION RATE: 18 BRPM | HEART RATE: 83 BPM | BODY MASS INDEX: 30.42 KG/M2 | DIASTOLIC BLOOD PRESSURE: 82 MMHG | SYSTOLIC BLOOD PRESSURE: 126 MMHG

## 2021-10-06 DIAGNOSIS — R52 PAIN: Primary | ICD-10-CM

## 2021-10-06 PROCEDURE — 99213 OFFICE O/P EST LOW 20 MIN: CPT | Performed by: FAMILY MEDICINE

## 2021-10-06 NOTE — PROGRESS NOTES
Subjective   Kisha Mosher is a 66 y.o. female.     66-year-old with pain left dorsal foot      The following portions of the patient's history were reviewed and updated as appropriate: allergies, current medications, past family history, past medical history, past social history, past surgical history and problem list.    Review of Systems   Respiratory: Negative for shortness of breath.    Cardiovascular: Negative for chest pain.   Musculoskeletal: Positive for arthralgias and joint swelling. Negative for back pain.        Groin pain left dorsal foot       Objective   Physical Exam  Vitals and nursing note reviewed.   Constitutional:       Appearance: Normal appearance.   Musculoskeletal:         General: Swelling present.      Comments: Swelling left dorsal foot over extensor tendons-has bony prominence-has to wear steel toe-work-suggestions offered   Skin:     General: Skin is warm and dry.      Capillary Refill: Capillary refill takes less than 2 seconds.   Neurological:      General: No focal deficit present.      Mental Status: She is alert and oriented to person, place, and time.   Psychiatric:         Mood and Affect: Mood normal.         Behavior: Behavior normal.         Thought Content: Thought content normal.         Judgment: Judgment normal.         Assessment/Plan   Diagnoses and all orders for this visit:    1. Pain (Primary)    Other orders  -     Cancel: CBC & Differential  -     Cancel: Uric Acid  -     Cancel: Basic Metabolic Panel         Plan above-had flu shot-will need probable repeat colonoscopy in January when she has her annual physical

## 2021-11-02 ENCOUNTER — TELEPHONE (OUTPATIENT)
Dept: FAMILY MEDICINE CLINIC | Facility: CLINIC | Age: 66
End: 2021-11-02

## 2021-11-02 ENCOUNTER — OFFICE VISIT (OUTPATIENT)
Dept: FAMILY MEDICINE CLINIC | Facility: CLINIC | Age: 66
End: 2021-11-02

## 2021-11-02 VITALS
OXYGEN SATURATION: 98 % | TEMPERATURE: 96.2 F | SYSTOLIC BLOOD PRESSURE: 133 MMHG | BODY MASS INDEX: 29.29 KG/M2 | WEIGHT: 186.6 LBS | DIASTOLIC BLOOD PRESSURE: 80 MMHG | HEIGHT: 67 IN | HEART RATE: 81 BPM

## 2021-11-02 DIAGNOSIS — R07.9 CHEST PAIN, UNSPECIFIED TYPE: Primary | ICD-10-CM

## 2021-11-02 PROCEDURE — 99213 OFFICE O/P EST LOW 20 MIN: CPT | Performed by: NURSE PRACTITIONER

## 2021-11-02 PROCEDURE — 93000 ELECTROCARDIOGRAM COMPLETE: CPT | Performed by: NURSE PRACTITIONER

## 2021-11-02 NOTE — PROGRESS NOTES
CC: chest apin    History:  Kisha Mosher is a 66 y.o. female who presents today for evaluation of the above problems.      Has had numbness in left arm off and on for several months. Happens at random times. Lasts for 5-10 minutes.   Has not had any pain until yesterday, when she had sharp pain that lasted a couple of seconds in her left chest.       HPI  ROS:  Review of Systems   Respiratory: Negative for shortness of breath.    Cardiovascular: Positive for chest pain.   Neurological:        Numbness and tingling of left shoulder, arm, hand       No Known Allergies  Past Medical History:   Diagnosis Date   • Essential hypertension 2019   • Fibroid uterus    • Glaucoma    • Hyperlipidemia      Past Surgical History:   Procedure Laterality Date   •  SECTION     • COLONOSCOPY  2016    [facility] DR TRUJILLO   • D & C HYSTEROSCOPY MYOSURE N/A 2020    Procedure: DILATATION AND CURETTAGE HYSTEROSCOPY WITH MYOSURE;  Surgeon: Dinh Syed MD;  Location: [facility];  Service: Obstetrics/Gynecology;  Laterality: N/A;   • EYE SURGERY Right    • JOINT REPLACEMENT     • TOTAL KNEE ARTHROPLASTY Right 2017   • TUBAL ABDOMINAL LIGATION       Family History   Problem Relation Age of Onset   • Breast cancer Sister    • Alzheimer's disease Mother    • Hypertension Mother    • No Known Problems Father    • No Known Problems Daughter    • No Known Problems Son    • No Known Problems Maternal Grandmother    • No Known Problems Maternal Grandfather    • No Known Problems Paternal Grandmother    • No Known Problems Paternal Grandfather    • Breast cancer Sister    • No Known Problems Daughter    • No Known Problems Son    • No Known Problems Son       reports that she has never smoked. She has never used smokeless tobacco. She reports that she does not drink alcohol and does not use drugs.      Current Outpatient Medications:   •  amLODIPine (NORVASC) 2.5 MG tablet, TAKE 1 TABLET BY MOUTH EVERY DAY,  "Disp: 90 tablet, Rfl: 3  •  atorvastatin (LIPITOR) 20 MG tablet, TAKE 1 TABLET BY MOUTH EVERY DAY, Disp: 90 tablet, Rfl: 2  •  azelastine (OPTIVAR) 0.05 % ophthalmic solution, 2 (Two) Times a Day As Needed (allergy)., Disp: , Rfl:   •  Azelastine HCl 137 MCG/SPRAY solution, USE 2 SPRAYS IN EACH NOSTRIL TWICE A DAY USE AS DIRECTED, Disp: 90 spray, Rfl: 0  •  fluticasone (FLONASE) 50 MCG/ACT nasal spray, SPRAY 1 SPRAY INTO EACH NOSTRIL TWICE A DAY (Patient taking differently: Daily As Needed.), Disp: 16 mL, Rfl: 2  •  indapamide (LOZOL) 1.25 MG tablet, TAKE 1 TABLET BY MOUTH EVERY DAY, Disp: 90 tablet, Rfl: 2  •  latanoprost (XALATAN) 0.005 % ophthalmic solution, Administer 1 drop to both eyes every night at bedtime., Disp: , Rfl:   •  levocetirizine (XYZAL) 5 MG tablet, Take 5 mg by mouth Daily., Disp: , Rfl:     OBJECTIVE:  /80 (BP Location: Left arm, Patient Position: Sitting, Cuff Size: Adult)   Pulse 81   Temp 96.2 °F (35.7 °C) (Temporal)   Ht 170.2 cm (67\")   Wt 84.6 kg (186 lb 9.6 oz)   SpO2 98%   Breastfeeding No   BMI 29.23 kg/m²    Physical Exam  Vitals reviewed.   Constitutional:       Appearance: She is well-developed.   Cardiovascular:      Rate and Rhythm: Normal rate and regular rhythm.      Heart sounds: Normal heart sounds.   Pulmonary:      Effort: Pulmonary effort is normal.      Breath sounds: Normal breath sounds.   Neurological:      Mental Status: She is alert and oriented to person, place, and time.   Psychiatric:         Behavior: Behavior normal.         Assessment/Plan    Diagnoses and all orders for this visit:    1. Chest pain, unspecified type (Primary)  -     ECG 12 Lead  -     Ambulatory Referral to Cardiology  -     Troponin T ([facility]); Future    Start baby aspirin daily. Troponin at hospital today. They will call result. Cardiology evaluation.    An After Visit Summary was printed and given to the patient at discharge.  Return if symptoms worsen or fail to improve, for " Next scheduled follow up.       Eden Luna, APRN 11/2/21    Electronically signed.

## 2021-11-02 NOTE — TELEPHONE ENCOUNTER
Caller: Kisha Mosher    Relationship: Self    Best call back number: [phone]     What test was performed: Troponin T    When was the test performed: TODAY 11/02/2021    Where was the test performed: [facility]    Additional notes: WOULD LIKE TO KNOW IF TEST RESULTS WERE BACK. REQUESTING CALLBACK TO DISCUSS ONCE FINAL AND SENT OVER. PLEASE ADVISE.

## 2021-11-02 NOTE — PROGRESS NOTES
Procedure     ECG 12 Lead    Date/Time: 11/2/2021 11:17 AM  Performed by: Kim Baker MA  Authorized by: Eden Luna APRN   Comparison: compared with previous ECG from 2/6/2020  Comparison to previous ECG: Previous was normal. Today shows possible old anterior infarct.   Rhythm: sinus rhythm  Rate: normal  BPM: 78  ST Segments: ST segments normal  T Waves: T waves normal  QRS axis: normal  Other findings comments: possible old anterior infarct    Clinical impression: non-specific ECG

## 2021-11-16 ENCOUNTER — TELEPHONE (OUTPATIENT)
Dept: FAMILY MEDICINE CLINIC | Facility: CLINIC | Age: 66
End: 2021-11-16

## 2021-11-16 DIAGNOSIS — R07.9 CHEST PAIN, UNSPECIFIED TYPE: Primary | ICD-10-CM

## 2021-11-16 NOTE — TELEPHONE ENCOUNTER
Caller: Kisha Mosher    Relationship: Self    Best call back number: [phone]    What is the best time to reach you: ANYTIME     Who are you requesting to speak with (clinical staff, provider,  specific staff member): CLINICAL         What was the call regarding: PATIENT REQUESTING A CALL BACK TO DISCUSS THE STATUS OF THE STRESS TEST APPOINTMENT THAT WE ARE TO SET UP FOR HER     Do you require a callback:  YES

## 2021-11-22 ENCOUNTER — HOSPITAL ENCOUNTER (OUTPATIENT)
Dept: CARDIOLOGY | Facility: HOSPITAL | Age: 66
Discharge: HOME | End: 2021-11-22
Admitting: NURSE PRACTITIONER

## 2021-11-22 VITALS
HEIGHT: 67 IN | WEIGHT: 185 LBS | DIASTOLIC BLOOD PRESSURE: 87 MMHG | HEART RATE: 73 BPM | SYSTOLIC BLOOD PRESSURE: 147 MMHG | BODY MASS INDEX: 29.03 KG/M2

## 2021-11-22 DIAGNOSIS — R07.9 CHEST PAIN, UNSPECIFIED TYPE: ICD-10-CM

## 2021-11-22 PROCEDURE — 93018 CV STRESS TEST I&R ONLY: CPT | Performed by: INTERNAL MEDICINE

## 2021-11-22 PROCEDURE — 93017 CV STRESS TEST TRACING ONLY: CPT

## 2021-11-23 LAB
BH CV STRESS BP STAGE 1: NORMAL
BH CV STRESS DURATION MIN STAGE 1: 6
BH CV STRESS DURATION SEC STAGE 1: 0
BH CV STRESS GRADE STAGE 1: 10
BH CV STRESS HR STAGE 1: 148
BH CV STRESS METS STAGE 1: 7.5
BH CV STRESS PROTOCOL 1: NORMAL
BH CV STRESS RECOVERY BP: NORMAL MMHG
BH CV STRESS RECOVERY HR: 85 BPM
BH CV STRESS SPEED STAGE 1: 2.5
BH CV STRESS STAGE 1: 1
MAXIMAL PREDICTED HEART RATE: 156 BPM
PERCENT MAX PREDICTED HR: 94.87 %
STRESS BASELINE BP: NORMAL MMHG
STRESS BASELINE HR: 74 BPM
STRESS PERCENT HR: 112 %
STRESS POST EXERCISE DUR MIN: 6 MIN
STRESS POST EXERCISE DUR SEC: 0 SEC
STRESS POST PEAK BP: NORMAL MMHG
STRESS POST PEAK HR: 148 BPM
STRESS TARGET HR: 133 BPM

## 2021-11-23 NOTE — PROGRESS NOTES
Is patient still having chest pain?  Her stress test was inconclusive, so they said if she was still symptomatic they would recommend an additional test.

## 2021-11-23 NOTE — PROGRESS NOTES
Ok. I just don't want it to fall through the cracks. Please check on it that they are getting her scheduled.

## 2021-11-30 ENCOUNTER — OFFICE VISIT (OUTPATIENT)
Dept: CARDIOLOGY | Facility: CLINIC | Age: 66
End: 2021-11-30

## 2021-11-30 VITALS
OXYGEN SATURATION: 98 % | DIASTOLIC BLOOD PRESSURE: 78 MMHG | WEIGHT: 187 LBS | SYSTOLIC BLOOD PRESSURE: 130 MMHG | HEART RATE: 82 BPM | BODY MASS INDEX: 29.35 KG/M2 | HEIGHT: 67 IN

## 2021-11-30 DIAGNOSIS — E78.2 MIXED HYPERLIPIDEMIA: ICD-10-CM

## 2021-11-30 DIAGNOSIS — I10 ESSENTIAL HYPERTENSION: ICD-10-CM

## 2021-11-30 DIAGNOSIS — Z71.89 CARDIAC RISK COUNSELING: ICD-10-CM

## 2021-11-30 DIAGNOSIS — R07.9 CHEST PAIN, UNSPECIFIED TYPE: Primary | ICD-10-CM

## 2021-11-30 PROCEDURE — 99204 OFFICE O/P NEW MOD 45 MIN: CPT | Performed by: INTERNAL MEDICINE

## 2021-11-30 PROCEDURE — 93000 ELECTROCARDIOGRAM COMPLETE: CPT | Performed by: INTERNAL MEDICINE

## 2021-11-30 RX ORDER — INDAPAMIDE 1.25 MG/1
TABLET, FILM COATED ORAL
Qty: 90 TABLET | Refills: 0 | Status: SHIPPED | OUTPATIENT
Start: 2021-11-30 | End: 2022-02-28

## 2021-11-30 RX ORDER — ATORVASTATIN CALCIUM 20 MG/1
TABLET, FILM COATED ORAL
Qty: 90 TABLET | Refills: 0 | Status: SHIPPED | OUTPATIENT
Start: 2021-11-30 | End: 2022-02-28

## 2021-11-30 NOTE — TELEPHONE ENCOUNTER
Rx Refill Note  Requested Prescriptions     Pending Prescriptions Disp Refills   • atorvastatin (LIPITOR) 20 MG tablet [Pharmacy Med Name: ATORVASTATIN 20 MG TABLET] 90 tablet 2     Sig: TAKE 1 TABLET BY MOUTH EVERY DAY   • indapamide (LOZOL) 1.25 MG tablet [Pharmacy Med Name: INDAPAMIDE 1.25 MG TABLET] 90 tablet 2     Sig: TAKE 1 TABLET BY MOUTH EVERY DAY      Last office visit with prescribing clinician: 10/6/2021      Next office visit with prescribing clinician: 1/17/22           Kim Baker MA  11/30/21, 07:21 CST

## 2021-11-30 NOTE — PROGRESS NOTES
[facility] - CARDIOLOGY  New Patient Initial Outpatient Evaluation    Primary Care Physician: Rivera Cunningham MD    Subjective     Chief Complaint: Chest pain    History of Present Illness    Ms. Mosher is a 66-year-old with hypertension, and hyperlipidemia who was referred to me for evaluation of chest pain by IVETT Francis. Ms. Luna had ordered a treadmill stress test on this patient that was completed here on 11/22/2021; see results below. Ms. Luna ordered that test on the basis of a visit she had with the patient in her office on 11/02/2021, in which she notes the patient reporting left arm numbness off, and on for several months that occurred randomly, and would last for 5 to 10 minutes. She had also just had 1 episode of chest pain the day prior to that visit that was described in that note as sharp, and lasting only a few seconds in her left chest. Ms. Luna sent her to the hospital for troponin that day that subsequent note encounter suggests was normal. EKG performed in the office that day showed sinus rhythm with anteroseptal T wave inversions, and poor R wave progression. She presents now today for further evaluation.    Ms. Mosher reports that she has not had any more chest pain since the 1 episode she experienced 11/01/20211 the day before her visit with IVETT Francis. She states that the day she experienced the chest pain, she was sitting at a desk at work, but is up, and down all day. She adds that she was not doing anything stressful. She reports that the chest pain was sharp, left-sided, short-lived lasting a few seconds, and was different than anything she has ever felt before. She reports that she had an EKG last year, and wants to know if there are any changes, and we discussed that. She denies that she has any other symptoms that limit her ability to exert herself, such as walking for a period of time other than shortness of breath, but attributes that to her age, and  "conditioning, and it is not an abrupt change in her breathing. She adds that she started dancing  swing for exercise in 2021, and used to dance regularly 10 years ago. She adds that she really pushes herself with dancing, and feels that she can keep up with the other dancers.    She adds that she has also had some numbness in her feet, and now she has a little swelling in her left foot, and it \"puddles.\" She thought this might be circulation related to her heart in some way. She had an x-ray of this foot, and it was unrevealing.     She has never smoked.    Review of Systems   Constitutional: Negative for malaise/fatigue.   Cardiovascular: Negative for chest pain, claudication, dyspnea on exertion, leg swelling, near-syncope, orthopnea, palpitations, paroxysmal nocturnal dyspnea and syncope.   Respiratory: Negative for shortness of breath.    Hematologic/Lymphatic: Does not bruise/bleed easily.   Musculoskeletal:        Left foot swelling, worse at night.        Otherwise complete ROS reviewed and negative except as mentioned in the HPI.      Past Medical History:   Past Medical History:   Diagnosis Date   • Abnormal ECG    • Essential hypertension 2019   • Fibroid uterus    • Glaucoma    • Hyperlipidemia        Past Surgical History:  Past Surgical History:   Procedure Laterality Date   •  SECTION     • COLONOSCOPY  2016    [facility] DR TRUJILLO   • D & C HYSTEROSCOPY MYOSURE N/A 2020    Procedure: DILATATION AND CURETTAGE HYSTEROSCOPY WITH MYOSURE;  Surgeon: Dinh Syed MD;  Location: [facility];  Service: Obstetrics/Gynecology;  Laterality: N/A;   • EYE SURGERY Right    • JOINT REPLACEMENT     • TOTAL KNEE ARTHROPLASTY Right 2017   • TUBAL ABDOMINAL LIGATION         Family History: family history includes Alzheimer's disease in her mother; Breast cancer in her sister and sister; Hypertension in her mother; No Known Problems in her daughter, daughter, father, maternal " "grandfather, maternal grandmother, paternal grandfather, paternal grandmother, son, son, and son.    Social History:  reports that she has never smoked. She has never used smokeless tobacco. She reports that she does not drink alcohol and does not use drugs.    Medications:  Prior to Admission medications    Medication Sig Start Date End Date Taking? Authorizing Provider   amLODIPine (NORVASC) 2.5 MG tablet TAKE 1 TABLET BY MOUTH EVERY DAY 1/15/21  Yes Eden Luna APRN   atorvastatin (LIPITOR) 20 MG tablet TAKE 1 TABLET BY MOUTH EVERY DAY 11/30/21  Yes Eden Luna APRN   azelastine (OPTIVAR) 0.05 % ophthalmic solution 2 (Two) Times a Day As Needed (allergy). 6/28/20  Yes Guillermo Pierson MD   Azelastine HCl 137 MCG/SPRAY solution USE 2 SPRAYS IN EACH NOSTRIL TWICE A DAY USE AS DIRECTED 1/5/21  Yes Rivera Cunningham MD   fluticasone (FLONASE) 50 MCG/ACT nasal spray SPRAY 1 SPRAY INTO EACH NOSTRIL TWICE A DAY  Patient taking differently: Daily As Needed. 6/22/20  Yes Rivera Cunningham MD   indapamide (LOZOL) 1.25 MG tablet TAKE 1 TABLET BY MOUTH EVERY DAY 11/30/21  Yes Eden Luna APRN   latanoprost (XALATAN) 0.005 % ophthalmic solution Administer 1 drop to both eyes every night at bedtime. 6/14/21  Yes Guillermo Pierson MD   levocetirizine (XYZAL) 5 MG tablet Take 5 mg by mouth Daily. 6/28/20  Yes Guillermo Pierson MD   atorvastatin (LIPITOR) 20 MG tablet TAKE 1 TABLET BY MOUTH EVERY DAY 3/3/21 11/30/21  Rivera Cunningham MD   indapamide (LOZOL) 1.25 MG tablet TAKE 1 TABLET BY MOUTH EVERY DAY 3/8/21 11/30/21  Rivera Cunningham MD     Allergies:  No Known Allergies    Objective     Vital Signs: /78   Pulse 82   Ht 170.2 cm (67\")   Wt 84.8 kg (187 lb)   SpO2 98%   BMI 29.29 kg/m²     Vitals and nursing note reviewed.   Constitutional:       General: Not in acute distress.     Appearance: Not in distress.   Neck:      Vascular: No JVD or JVR. JVD normal. "   Pulmonary:      Effort: Pulmonary effort is normal.      Breath sounds: Normal breath sounds.   Cardiovascular:      Normal rate. Regular rhythm.      Murmurs: There is no murmur.      No gallop. No rub.   Pulses:     Intact distal pulses.   Skin:     General: Skin is warm and dry.   Neurological:      Mental Status: Alert, oriented to person, place, and time and oriented to person, place and time.         Results Reviewed:    -A 12 lead EKG performed in the office of Ms. Eden Luna on 11/02/2021 personally reviewed:   My interpretation shows normal sinus rhythm with a rate of 78 beats per minute, normal axis, T wave inversions in V1, and V2, and poor R wave progression across the precordium. Abnormal EKG. There are also flattened T waves in leads I, aVL, V3, and V4.    ============================================================  Interpretation Summary for Treadmill Stress Test performed 11/22/2021    · Indeterminate stress test/nondiagostic.  · Patient has resting ST/T wave abnormalities or resting ECG, which lessens the sensitivity of this test to detect ischemia. Target heart rate was achieved and there were no obvious ischemic changes and patient did not complain of angina, but did report shortness of breath.  · Mildly impaired exercise capacity (had to remain in Stage I for extended period - making it a modified Sandoval protocol).  · If clinical concern persists, recommend repeating stress test with imaging (ie stress echocardiogram).      Lipid Panel (07/13/2021 07:36)      ECG 12 Lead    Date/Time: 11/30/2021 1:01 PM  Performed by: Monty Ocampo MD  Authorized by: Monty Ocampo MD   Comparison: compared with previous ECG from 11/2/2021  Similar to previous ECG  Rhythm: sinus rhythm  BPM: 82  T inversion: V1 and V2  T flattening: aVL  Other findings: poor R wave progression    Clinical impression: abnormal EKG              Lab Results   Component Value Date    TRIG 92 07/13/2021    HDL 66 (H)  07/13/2021    VLDL 17 07/13/2021     No results found for: HGBA1C        Assessment / Plan        Problem List Items Addressed This Visit        Cardiac and Vasculature    Mixed hyperlipidemia    Essential hypertension      Other Visit Diagnoses     Chest pain, unspecified type    -  Primary    Cardiac risk counseling              1. Chest pain: By definition, her chest pain was noncardiac in nature. She had 1 episode of sharp pain that lasted for a matter of a few seconds that was nonexertional, and that was almost 4 weeks ago with no recurrence since. She has no exertionally-limiting symptoms whatsoever. Therefore, no further testing is recommended.  - I did spend the bulk of today going over primary preventative strategies/cardiac risk counseling as noted below. I did encourage her to strive for HA-recommended 150 minutes of moderately vigorous aerobic activity weekly, and if she starts to notice any exertionally-limiting symptoms while doing so, to call us back, and we will consider further testing.    2. Essential hypertension: Well-controlled. Continue current medications including indapamide, and amlodipine.    3. Mixed hyperlipidemia: Recent lipid panel reviewed, and well-controlled per ATP III guidelines for primary prevention of heart disease; continue current statin therapy, and routine PCP follow-up.    4. Cardiac risk counseling: Did discuss means of primary prevention of developing CAD, particularly with regards to close PCP follow-up for risk factor stratification, and modification. We also discussed exercise goals. Congratulated the patient on never having started smoking!    Thank you for the referral. No specific cardiology follow-up required at this time.    Transcribed from ambient dictation for Monty Ocampo MD by Kassie Celeste.  11/30/21   14:37 CST    Patient verbalized consent to the visit recording.   I Monty Ocampo MD have personally performed the services described in this document as  scribed by the above individual, and it is both accurate and complete.   I have edited each component as needed.    Monty Ocampo MD  11/30/2021  21:20 CST

## 2022-01-17 ENCOUNTER — OFFICE VISIT (OUTPATIENT)
Dept: FAMILY MEDICINE CLINIC | Facility: CLINIC | Age: 67
End: 2022-01-17

## 2022-01-17 VITALS
OXYGEN SATURATION: 98 % | HEIGHT: 67 IN | WEIGHT: 186.4 LBS | BODY MASS INDEX: 29.26 KG/M2 | HEART RATE: 66 BPM | TEMPERATURE: 97.5 F | SYSTOLIC BLOOD PRESSURE: 130 MMHG | DIASTOLIC BLOOD PRESSURE: 80 MMHG

## 2022-01-17 DIAGNOSIS — I10 ESSENTIAL HYPERTENSION: ICD-10-CM

## 2022-01-17 DIAGNOSIS — Z12.4 SCREENING FOR MALIGNANT NEOPLASM OF THE CERVIX: ICD-10-CM

## 2022-01-17 DIAGNOSIS — Z86.010 HX OF ADENOMATOUS COLONIC POLYPS: ICD-10-CM

## 2022-01-17 DIAGNOSIS — Z00.00 ANNUAL PHYSICAL EXAM: Primary | ICD-10-CM

## 2022-01-17 DIAGNOSIS — Z12.31 ENCOUNTER FOR SCREENING MAMMOGRAM FOR MALIGNANT NEOPLASM OF BREAST: ICD-10-CM

## 2022-01-17 DIAGNOSIS — R53.83 FATIGUE, UNSPECIFIED TYPE: ICD-10-CM

## 2022-01-17 DIAGNOSIS — E78.2 MIXED HYPERLIPIDEMIA: ICD-10-CM

## 2022-01-17 LAB
BILIRUB BLD-MCNC: NEGATIVE MG/DL
CLARITY, POC: CLEAR
COLOR UR: YELLOW
GLUCOSE UR STRIP-MCNC: NEGATIVE MG/DL
KETONES UR QL: NEGATIVE
LEUKOCYTE EST, POC: NEGATIVE
NITRITE UR-MCNC: NEGATIVE MG/ML
PH UR: 7 [PH] (ref 5–8)
PROT UR STRIP-MCNC: NEGATIVE MG/DL
RBC # UR STRIP: ABNORMAL /UL
SP GR UR: 1.02 (ref 1–1.03)
UROBILINOGEN UR QL: NORMAL

## 2022-01-17 PROCEDURE — 99396 PREV VISIT EST AGE 40-64: CPT | Performed by: NURSE PRACTITIONER

## 2022-01-17 PROCEDURE — 81003 URINALYSIS AUTO W/O SCOPE: CPT | Performed by: NURSE PRACTITIONER

## 2022-01-17 RX ORDER — MECLIZINE HYDROCHLORIDE 25 MG/1
25 TABLET ORAL 3 TIMES DAILY PRN
Qty: 90 TABLET | Refills: 0 | Status: SHIPPED | OUTPATIENT
Start: 2022-01-17 | End: 2022-06-20

## 2022-01-17 NOTE — PROGRESS NOTES
CC: annual physical    History:  Kisha Mosher is a 66 y.o. female who presents today for evaluation of the above problems.    Patient reports that she is doing well.  She denies any current complaints other than occasional dizziness from inner ear. She is due for colonoscopy and mammogram. She has had her flu shot and covid vaccines. BP is well controlled.       HPI  ROS:  Review of Systems   Constitutional: Negative for fever.   Respiratory: Negative for shortness of breath.    Cardiovascular: Negative for chest pain.   Gastrointestinal: Negative for constipation, diarrhea, nausea and vomiting.   Genitourinary: Negative for difficulty urinating and pelvic pain.   Neurological: Positive for dizziness (occasional).       No Known Allergies  Past Medical History:   Diagnosis Date   • Abnormal ECG    • Essential hypertension 2019   • Fibroid uterus    • Glaucoma    • Hyperlipidemia      Past Surgical History:   Procedure Laterality Date   •  SECTION     • [place]ONOSCOPY  [place]    [facility] [place]   • D & C HYSTEROSCOPY MYOSURE N/A 2020    Procedure: DILATATION AND CURETTAGE HYSTEROSCOPY WITH MYOSURE;  Surgeon: Dinh Syed MD;  Location: [facility];  Service: Obstetrics/Gynecology;  Laterality: N/A;   • EYE SURGERY Right    • JOINT REPLACEMENT     • TOTAL KNEE ARTHROPLASTY Right 2017   • TUBAL ABDOMINAL LIGATION       Family History   Problem Relation Age of Onset   • Breast cancer Sister    • Alzheimer's disease Mother    • Hypertension Mother    • No Known Problems Father    • No Known Problems Daughter    • No Known Problems Son    • No Known Problems Maternal Grandmother    • No Known Problems Maternal Grandfather    • No Known Problems Paternal Grandmother    • No Known Problems Paternal Grandfather    • Breast cancer Sister    • No Known Problems Daughter    • No Known Problems Son    • No Known Problems Son       reports that she has never smoked. She has never used  "smokeless tobacco. She reports that she does not drink alcohol and does not use drugs.      Current Outpatient Medications:   •  amLODIPine (NORVASC) 2.5 MG tablet, TAKE 1 TABLET BY MOUTH EVERY DAY, Disp: 90 tablet, Rfl: 3  •  atorvastatin (LIPITOR) 20 MG tablet, TAKE 1 TABLET BY MOUTH EVERY DAY, Disp: 90 tablet, Rfl: 0  •  Azelastine HCl 137 MCG/SPRAY solution, USE 2 SPRAYS IN EACH NOSTRIL TWICE A DAY USE AS DIRECTED, Disp: 90 spray, Rfl: 0  •  fluticasone (FLONASE) 50 MCG/ACT nasal spray, SPRAY 1 SPRAY INTO EACH NOSTRIL TWICE A DAY (Patient taking differently: Daily As Needed.), Disp: 16 mL, Rfl: 2  •  indapamide (LOZOL) 1.25 MG tablet, TAKE 1 TABLET BY MOUTH EVERY DAY, Disp: 90 tablet, Rfl: 0  •  latanoprost (XALATAN) 0.005 % ophthalmic solution, Administer 1 drop to both eyes every night at bedtime., Disp: , Rfl:   •  levocetirizine (XYZAL) 5 MG tablet, Take 5 mg by mouth Daily., Disp: , Rfl:   •  meclizine (ANTIVERT) 25 MG tablet, Take 1 tablet by mouth 3 (Three) Times a Day As Needed for Dizziness., Disp: 90 tablet, Rfl: 0    OBJECTIVE:  /80 (BP Location: Left arm, Patient Position: Sitting, Cuff Size: Adult)   Pulse 66   Temp 97.5 °F (36.4 °C) (Temporal)   Ht 170.2 cm (67\")   Wt 84.6 kg (186 lb 6.4 oz)   SpO2 98%   Breastfeeding No   BMI 29.19 kg/m²    Physical Exam  Vitals reviewed.   Constitutional:       Appearance: She is well-developed.   HENT:      Right Ear: Tympanic membrane, ear canal and external ear normal.      Left Ear: Tympanic membrane, ear canal and external ear normal.      Mouth/Throat:      Mouth: Mucous membranes are moist.      Pharynx: Oropharynx is clear.   Neck:      Vascular: No carotid bruit.   Cardiovascular:      Rate and Rhythm: Normal rate and regular rhythm.      Heart sounds: Normal heart sounds.   Pulmonary:      Effort: Pulmonary effort is normal.      Breath sounds: Normal breath sounds.   Chest:   Breasts:      Right: Normal.      Left: Normal.       Abdominal: "      General: Abdomen is flat. Bowel sounds are normal.      Palpations: Abdomen is soft.      Hernia: There is no hernia in the left inguinal area or right inguinal area.   Genitourinary:     Exam position: Lithotomy position.      Labia:         Right: No rash, tenderness, lesion or injury.         Left: No rash, tenderness, lesion or injury.       Comments: Cystocele - grade 2 noted on exam.  Severe friability of cervix and vaginal atrophy. No other abnormality noted.  Lymphadenopathy:      Lower Body: No right inguinal adenopathy. No left inguinal adenopathy.   Neurological:      Mental Status: She is alert and oriented to person, place, and time.   Psychiatric:         Behavior: Behavior normal.         Assessment/Plan    Diagnoses and all orders for this visit:    1. Annual physical exam (Primary)  -     CBC & Differential  -     TSH  -     T4, free  -     Comprehensive Metabolic Panel  -     Lipid Panel  -     POC Urinalysis Dipstick, Multipro    2. Essential hypertension  -     Comprehensive Metabolic Panel    3. Hx of adenomatous colonic polyps    4. Mixed hyperlipidemia  -     Lipid Panel    5. Fatigue, unspecified type  -     CBC & Differential  -     TSH  -     T4, free    6. Encounter for screening mammogram for malignant neoplasm of breast  -     Mammo Screening Digital Tomosynthesis Bilateral With CAD; Future    Other orders  -     meclizine (ANTIVERT) 25 MG tablet; Take 1 tablet by mouth 3 (Three) Times a Day As Needed for Dizziness.  Dispense: 90 tablet; Refill: 0    HEALTH MAINTENANCE  Labs today. Mammogram ordered. Patient requests to wait until after her birthday for colonoscopy. Will revisit at [facility] in 6 months.     An After Visit Summary was printed and given to the patient at discharge.  Return in about 6 months (around 7/17/2022) for Next scheduled follow up.       IVETT Francis 1/17/22    Electronically signed.

## 2022-01-18 LAB
ALBUMIN SERPL-MCNC: 4.3 G/DL (ref 3.5–5.2)
ALBUMIN/GLOB SERPL: 1.6 G/DL
ALP SERPL-CCNC: 109 U/L (ref 39–117)
ALT SERPL-CCNC: 22 U/L (ref 1–33)
AST SERPL-CCNC: 22 U/L (ref 1–32)
BASOPHILS # BLD AUTO: 0.06 10*3/MM3 (ref 0–0.2)
BASOPHILS NFR BLD AUTO: 1.2 % (ref 0–1.5)
BILIRUB SERPL-MCNC: 0.5 MG/DL (ref 0–1.2)
BUN SERPL-MCNC: 12 MG/DL (ref 8–23)
BUN/CREAT SERPL: 16.9 (ref 7–25)
CALCIUM SERPL-MCNC: 9.8 MG/DL (ref 8.6–10.5)
CHLORIDE SERPL-SCNC: 103 MMOL/L (ref 98–107)
CHOLEST SERPL-MCNC: 196 MG/DL (ref 0–200)
CO2 SERPL-SCNC: 30.5 MMOL/L (ref 22–29)
CREAT SERPL-MCNC: 0.71 MG/DL (ref 0.57–1)
EOSINOPHIL # BLD AUTO: 0.1 10*3/MM3 (ref 0–0.4)
EOSINOPHIL NFR BLD AUTO: 2.1 % (ref 0.3–6.2)
ERYTHROCYTE [DISTWIDTH] IN BLOOD BY AUTOMATED COUNT: 13.2 % (ref 12.3–15.4)
GLOBULIN SER CALC-MCNC: 2.7 GM/DL
GLUCOSE SERPL-MCNC: 95 MG/DL (ref 65–99)
HCT VFR BLD AUTO: 49.3 % (ref 34–46.6)
HDLC SERPL-MCNC: 67 MG/DL (ref 40–60)
HGB BLD-MCNC: 15.8 G/DL (ref 12–15.9)
IMM GRANULOCYTES # BLD AUTO: 0.02 10*3/MM3 (ref 0–0.05)
IMM GRANULOCYTES NFR BLD AUTO: 0.4 % (ref 0–0.5)
LDLC SERPL CALC-MCNC: 107 MG/DL (ref 0–100)
LYMPHOCYTES # BLD AUTO: 1.67 10*3/MM3 (ref 0.7–3.1)
LYMPHOCYTES NFR BLD AUTO: 34.5 % (ref 19.6–45.3)
MCH RBC QN AUTO: 29.9 PG (ref 26.6–33)
MCHC RBC AUTO-ENTMCNC: 32 G/DL (ref 31.5–35.7)
MCV RBC AUTO: 93.4 FL (ref 79–97)
MONOCYTES # BLD AUTO: 0.41 10*3/MM3 (ref 0.1–0.9)
MONOCYTES NFR BLD AUTO: 8.5 % (ref 5–12)
NEUTROPHILS # BLD AUTO: 2.58 10*3/MM3 (ref 1.7–7)
NEUTROPHILS NFR BLD AUTO: 53.3 % (ref 42.7–76)
NRBC BLD AUTO-RTO: 0 /100 WBC (ref 0–0.2)
PLATELET # BLD AUTO: 210 10*3/MM3 (ref 140–450)
POTASSIUM SERPL-SCNC: 4.1 MMOL/L (ref 3.5–5.2)
PROT SERPL-MCNC: 7 G/DL (ref 6–8.5)
RBC # BLD AUTO: 5.28 10*6/MM3 (ref 3.77–5.28)
SODIUM SERPL-SCNC: 142 MMOL/L (ref 136–145)
T4 FREE SERPL-MCNC: 1.32 NG/DL (ref 0.93–1.7)
TRIGL SERPL-MCNC: 125 MG/DL (ref 0–150)
TSH SERPL DL<=0.005 MIU/L-ACNC: 2.58 UIU/ML (ref 0.27–4.2)
VLDLC SERPL CALC-MCNC: 22 MG/DL (ref 5–40)
WBC # BLD AUTO: 4.84 10*3/MM3 (ref 3.4–10.8)

## 2022-01-22 LAB
CYTOLOGIST CVX/VAG CYTO: ABNORMAL
CYTOLOGY CVX/VAG DOC CYTO: ABNORMAL
CYTOLOGY CVX/VAG DOC THIN PREP: ABNORMAL
DX ICD CODE: ABNORMAL
HIV 1 & 2 AB SER-IMP: ABNORMAL
HPV I/H RISK 4 DNA CVX QL PROBE+SIG AMP: POSITIVE
HPV16 DNA CVX QL PROBE+SIG AMP: NEGATIVE
HPV18+45 E6+E7 MRNA CVX QL NAA+PROBE: NEGATIVE
OTHER STN SPEC: ABNORMAL
STAT OF ADQ CVX/VAG CYTO-IMP: ABNORMAL

## 2022-01-24 NOTE — PROGRESS NOTES
Please advise that cervical cells were normal, but she was positive for HPV, which is a sexually transmitted virus that can cause cervical cancer , though some people never have any symptoms. We will just need to continue to do yearly PAP to monitor for any changes to the cervix.

## 2022-01-26 DIAGNOSIS — I10 ESSENTIAL HYPERTENSION: ICD-10-CM

## 2022-01-26 RX ORDER — AMLODIPINE BESYLATE 2.5 MG/1
2.5 TABLET ORAL DAILY
Qty: 90 TABLET | Refills: 3 | Status: SHIPPED | OUTPATIENT
Start: 2022-01-26 | End: 2022-04-29 | Stop reason: SDUPTHER

## 2022-01-26 NOTE — TELEPHONE ENCOUNTER
Rx Refill Note  Requested Prescriptions     Pending Prescriptions Disp Refills   • amLODIPine (NORVASC) 2.5 MG tablet 90 tablet 3     Sig: Take 1 tablet by mouth Daily.      Last office visit with prescribing clinician: 10/6/2021      Next office visit with prescribing clinician: Visit date not found            Nuar Chavez Rep  01/26/22, 07:58 CST

## 2022-02-28 RX ORDER — ATORVASTATIN CALCIUM 20 MG/1
TABLET, FILM COATED ORAL
Qty: 90 TABLET | Refills: 3 | Status: SHIPPED | OUTPATIENT
Start: 2022-02-28 | End: 2022-03-22 | Stop reason: SDUPTHER

## 2022-02-28 RX ORDER — INDAPAMIDE 1.25 MG/1
TABLET, FILM COATED ORAL
Qty: 90 TABLET | Refills: 3 | Status: SHIPPED | OUTPATIENT
Start: 2022-02-28 | End: 2023-03-07

## 2022-02-28 NOTE — TELEPHONE ENCOUNTER
Rx Refill Note  Requested Prescriptions     Pending Prescriptions Disp Refills   • atorvastatin (LIPITOR) 20 MG tablet [Pharmacy Med Name: ATORVASTATIN 20 MG TABLET] 90 tablet 0     Sig: TAKE 1 TABLET BY MOUTH EVERY DAY   • indapamide (LOZOL) 1.25 MG tablet [Pharmacy Med Name: INDAPAMIDE 1.25 MG TABLET] 90 tablet 0     Sig: TAKE 1 TABLET BY MOUTH EVERY DAY      Last office visit with prescribing clinician: 1/17/2022      Next office visit with prescribing clinician: 7/18/2022            Kim Baker MA  02/28/22, 16:04 CST

## 2022-03-22 ENCOUNTER — OFFICE VISIT (OUTPATIENT)
Dept: FAMILY MEDICINE CLINIC | Facility: CLINIC | Age: 67
End: 2022-03-22

## 2022-03-22 VITALS
SYSTOLIC BLOOD PRESSURE: 120 MMHG | BODY MASS INDEX: 29.54 KG/M2 | HEART RATE: 82 BPM | TEMPERATURE: 97.1 F | HEIGHT: 67 IN | DIASTOLIC BLOOD PRESSURE: 80 MMHG | WEIGHT: 188.2 LBS | OXYGEN SATURATION: 98 %

## 2022-03-22 DIAGNOSIS — S00.01XA ABRASION OF SCALP, INITIAL ENCOUNTER: Primary | ICD-10-CM

## 2022-03-22 PROCEDURE — 96372 THER/PROPH/DIAG INJ SC/IM: CPT | Performed by: NURSE PRACTITIONER

## 2022-03-22 PROCEDURE — 99213 OFFICE O/P EST LOW 20 MIN: CPT | Performed by: NURSE PRACTITIONER

## 2022-03-22 RX ORDER — CEFTRIAXONE 500 MG/1
500 INJECTION, POWDER, FOR SOLUTION INTRAMUSCULAR; INTRAVENOUS ONCE
Status: COMPLETED | OUTPATIENT
Start: 2022-03-22 | End: 2022-03-22

## 2022-03-22 RX ORDER — ATORVASTATIN CALCIUM 10 MG/1
10 TABLET, FILM COATED ORAL DAILY
Qty: 90 TABLET | Refills: 1 | Status: SHIPPED | OUTPATIENT
Start: 2022-03-22 | End: 2023-01-20 | Stop reason: DRUGHIGH

## 2022-03-22 RX ORDER — SULFAMETHOXAZOLE AND TRIMETHOPRIM 800; 160 MG/1; MG/1
1 TABLET ORAL 2 TIMES DAILY
Qty: 14 TABLET | Refills: 0 | Status: SHIPPED | OUTPATIENT
Start: 2022-03-22 | End: 2022-04-29

## 2022-03-22 RX ADMIN — CEFTRIAXONE 500 MG: 500 INJECTION, POWDER, FOR SOLUTION INTRAMUSCULAR; INTRAVENOUS at 09:43

## 2022-03-22 NOTE — PROGRESS NOTES
CC: scalp abrasion    History:  Kisha Mosher is a 66 y.o. female who presents today for evaluation of the above problems.      Patient states that she wore a wig one day last week and the clip rubbed on her scalp and made a sore. She attempted to treat the lesion herself, however, it has became larger and has purulent drainage. She had azithromycin at home and has take 500 mg for two days and has been using bacitracin ointment also. States that it is some better, but still has purulent drainage.     Additionally, she notes that she has been splitting her atorvastatin in half so she can tolerate the medication. She can tell that she doesn't have as much joint pain on smaller dose.     HPI  ROS:  Review of Systems   Constitutional: Negative for fever.   Skin: Positive for wound.       No Known Allergies  Past Medical History:   Diagnosis Date   • Abnormal ECG    • Essential hypertension 2019   • Fibroid uterus    • Glaucoma    • Hyperlipidemia      Past Surgical History:   Procedure Laterality Date   •  SECTION     • COLONOSCOPY  2016    [facility] [place]   • D & C HYSTEROSCOPY MYOSURE N/A 2020    Procedure: DILATATION AND CURETTAGE HYSTEROSCOPY WITH MYOSURE;  Surgeon: Dinh Syde MD;  Location: [facility];  Service: Obstetrics/Gynecology;  Laterality: N/A;   • EYE SURGERY Right    • JOINT REPLACEMENT     • TOTAL KNEE ARTHROPLASTY Right 2017   • TUBAL ABDOMINAL LIGATION       Family History   Problem Relation Age of Onset   • Breast cancer Sister    • Alzheimer's disease Mother    • Hypertension Mother    • No Known Problems Father    • No Known Problems Daughter    • No Known Problems Son    • No Known Problems Maternal Grandmother    • No Known Problems Maternal Grandfather    • No Known Problems Paternal Grandmother    • No Known Problems Paternal Grandfather    • Breast cancer Sister    • No Known Problems Daughter    • No Known Problems Son    • No Known Problems Son   "     reports that she has never smoked. She has never used smokeless tobacco. She reports that she does not drink alcohol and does not use drugs.      Current Outpatient Medications:   •  amLODIPine (NORVASC) 2.5 MG tablet, Take 1 tablet by mouth Daily., Disp: 90 tablet, Rfl: 3  •  atorvastatin (LIPITOR) 10 MG tablet, Take 1 tablet by mouth Daily., Disp: 90 tablet, Rfl: 1  •  Azelastine HCl 137 MCG/SPRAY solution, USE 2 SPRAYS IN EACH NOSTRIL TWICE A DAY USE AS DIRECTED, Disp: 90 spray, Rfl: 0  •  fluticasone (FLONASE) 50 MCG/ACT nasal spray, SPRAY 1 SPRAY INTO EACH NOSTRIL TWICE A DAY (Patient taking differently: Daily As Needed.), Disp: 16 mL, Rfl: 2  •  indapamide (LOZOL) 1.25 MG tablet, TAKE 1 TABLET BY MOUTH EVERY DAY, Disp: 90 tablet, Rfl: 3  •  latanoprost (XALATAN) 0.005 % ophthalmic solution, Administer 1 drop to both eyes every night at bedtime., Disp: , Rfl:   •  levocetirizine (XYZAL) 5 MG tablet, Take 5 mg by mouth Daily., Disp: , Rfl:   •  meclizine (ANTIVERT) 25 MG tablet, Take 1 tablet by mouth 3 (Three) Times a Day As Needed for Dizziness., Disp: 90 tablet, Rfl: 0  •  sulfamethoxazole-trimethoprim (Bactrim DS) 800-160 MG per tablet, Take 1 tablet by mouth 2 (Two) Times a Day., Disp: 14 tablet, Rfl: 0    Current Facility-Administered Medications:   •  cefTRIAXone (ROCEPHIN) injection 500 mg, 500 mg, Intramuscular, Once, Eden Luna, APRN    OBJECTIVE:  /80 (BP Location: Left arm, Patient Position: Sitting, Cuff Size: Large Adult)   Pulse 82   Temp 97.1 °F (36.2 °C)   Ht 170.2 cm (67\")   Wt 85.4 kg (188 lb 3.2 oz)   SpO2 98%   BMI 29.48 kg/m²    Physical Exam  Vitals reviewed.   Constitutional:       Appearance: She is well-developed.   HENT:      Head:     Cardiovascular:      Rate and Rhythm: Normal rate.   Pulmonary:      Effort: Pulmonary effort is normal.   Neurological:      Mental Status: She is alert and oriented to person, place, and time.   Psychiatric:         Behavior: " Behavior normal.         Assessment/Plan    Diagnoses and all orders for this visit:    1. Abrasion of scalp, initial encounter (Primary)  -     sulfamethoxazole-trimethoprim (Bactrim DS) 800-160 MG per tablet; Take 1 tablet by mouth 2 (Two) Times a Day.  Dispense: 14 tablet; Refill: 0  -     cefTRIAXone (ROCEPHIN) injection 500 mg    Other orders  -     atorvastatin (LIPITOR) 10 MG tablet; Take 1 tablet by mouth Daily.  Dispense: 90 tablet; Refill: 1    Ok to take 10 mg on atorvastatin and will recheck lipids in July.    An After Visit Summary was printed and given to the patient at discharge.  Return if symptoms worsen or fail to improve, for Next scheduled follow up.       IVETT Francis 3/22/22    Electronically signed.

## 2022-03-31 ENCOUNTER — OFFICE VISIT (OUTPATIENT)
Dept: FAMILY MEDICINE CLINIC | Facility: CLINIC | Age: 67
End: 2022-03-31

## 2022-03-31 VITALS
WEIGHT: 185 LBS | BODY MASS INDEX: 29.03 KG/M2 | OXYGEN SATURATION: 96 % | HEIGHT: 67 IN | TEMPERATURE: 97.7 F | DIASTOLIC BLOOD PRESSURE: 80 MMHG | HEART RATE: 71 BPM | SYSTOLIC BLOOD PRESSURE: 150 MMHG

## 2022-03-31 DIAGNOSIS — F41.9 ANXIETY: Primary | ICD-10-CM

## 2022-03-31 PROCEDURE — 99212 OFFICE O/P EST SF 10 MIN: CPT | Performed by: FAMILY MEDICINE

## 2022-03-31 RX ORDER — BUPROPION HYDROCHLORIDE 150 MG/1
150 TABLET ORAL DAILY
Qty: 90 TABLET | Refills: 3 | Status: SHIPPED | OUTPATIENT
Start: 2022-03-31 | End: 2022-04-29 | Stop reason: SDUPTHER

## 2022-03-31 NOTE — PROGRESS NOTES
[facility]-medicine sent in-no work x1 month see back 1 month-patient very distraught not suicidal

## 2022-04-29 ENCOUNTER — OFFICE VISIT (OUTPATIENT)
Dept: FAMILY MEDICINE CLINIC | Facility: CLINIC | Age: 67
End: 2022-04-29

## 2022-04-29 VITALS
BODY MASS INDEX: 29.03 KG/M2 | HEIGHT: 67 IN | SYSTOLIC BLOOD PRESSURE: 142 MMHG | RESPIRATION RATE: 18 BRPM | HEART RATE: 85 BPM | TEMPERATURE: 97.3 F | OXYGEN SATURATION: 98 % | WEIGHT: 185 LBS | DIASTOLIC BLOOD PRESSURE: 90 MMHG

## 2022-04-29 DIAGNOSIS — F32.A DEPRESSION, UNSPECIFIED DEPRESSION TYPE: ICD-10-CM

## 2022-04-29 DIAGNOSIS — F41.9 ANXIETY: Primary | ICD-10-CM

## 2022-04-29 DIAGNOSIS — I10 ESSENTIAL HYPERTENSION: ICD-10-CM

## 2022-04-29 PROCEDURE — 99213 OFFICE O/P EST LOW 20 MIN: CPT | Performed by: FAMILY MEDICINE

## 2022-04-29 RX ORDER — AMLODIPINE BESYLATE 5 MG/1
5 TABLET ORAL DAILY
Qty: 90 TABLET | Refills: 3 | Status: SHIPPED | OUTPATIENT
Start: 2022-04-29 | End: 2023-01-20 | Stop reason: ALTCHOICE

## 2022-04-29 RX ORDER — BUPROPION HYDROCHLORIDE 300 MG/1
300 TABLET ORAL EVERY MORNING
Qty: 90 TABLET | Refills: 3 | Status: SHIPPED | OUTPATIENT
Start: 2022-04-29 | End: 2023-01-20 | Stop reason: DRUGHIGH

## 2022-04-29 NOTE — PROGRESS NOTES
Subjective   Kisha Mosher is a 67 y.o. female.     67-year-old who lost her son recently from suicide.  Extreme grief depression days no work sent for psychological counseling      The following portions of the patient's history were reviewed and updated as appropriate: allergies, current medications, past family history, past medical history, past social history, past surgical history and problem list.    Review of Systems   Cardiovascular: Negative for chest pain.        Blood pressure up-medicine increased   Musculoskeletal: Positive for arthralgias.        Knee-progressive DJD   Neurological: Negative for headaches.   Psychiatric/Behavioral: Positive for sleep disturbance. Negative for suicidal ideas. The patient is nervous/anxious.        Objective   Physical Exam  Vitals and nursing note reviewed.   Constitutional:       Appearance: Normal appearance.   HENT:      Mouth/Throat:      Mouth: Mucous membranes are moist.      Pharynx: Oropharynx is clear.   Eyes:      Extraocular Movements: Extraocular movements intact.      Pupils: Pupils are equal, round, and reactive to light.   Cardiovascular:      Rate and Rhythm: Normal rate and regular rhythm.   Pulmonary:      Effort: Pulmonary effort is normal.      Breath sounds: No wheezing or rhonchi.   Musculoskeletal:      Right lower leg: No edema.      Left lower leg: No edema.   Skin:     General: Skin is warm and dry.   Neurological:      General: No focal deficit present.      Mental Status: She is oriented to person, place, and time.   Psychiatric:         Behavior: Behavior normal.      Comments: Mood judgment thought content not appropriate-extreme grieving-sent for psychological counseling         Assessment/Plan   Diagnoses and all orders for this visit:    1. Anxiety (Primary)  -     Psychotherapy; Future    2. Depression, unspecified depression type  -     Psychotherapy; Future    3. Essential hypertension  -     amLODIPine (NORVASC) 5 MG tablet; Take 1  tablet by mouth Daily.  Dispense: 90 tablet; Refill: 3    Other orders  -     buPROPion XL (Wellbutrin XL) 300 MG 24 hr tablet; Take 1 tablet by mouth Every Morning.  Dispense: 90 tablet; Refill: 3       Plan above-no work-return 1 month-urgent counseling

## 2022-05-25 ENCOUNTER — TELEPHONE (OUTPATIENT)
Dept: FAMILY MEDICINE CLINIC | Facility: CLINIC | Age: 67
End: 2022-05-25

## 2022-05-26 ENCOUNTER — OFFICE VISIT (OUTPATIENT)
Dept: FAMILY MEDICINE CLINIC | Facility: CLINIC | Age: 67
End: 2022-05-26

## 2022-05-26 VITALS
HEART RATE: 88 BPM | BODY MASS INDEX: 29.29 KG/M2 | DIASTOLIC BLOOD PRESSURE: 90 MMHG | HEIGHT: 67 IN | SYSTOLIC BLOOD PRESSURE: 138 MMHG | OXYGEN SATURATION: 97 % | WEIGHT: 186.6 LBS | TEMPERATURE: 98 F | RESPIRATION RATE: 16 BRPM

## 2022-05-26 DIAGNOSIS — F41.9 ANXIETY: Primary | ICD-10-CM

## 2022-05-26 DIAGNOSIS — Z86.010 HISTORY OF COLONIC POLYPS: ICD-10-CM

## 2022-05-26 PROCEDURE — 99213 OFFICE O/P EST LOW 20 MIN: CPT | Performed by: FAMILY MEDICINE

## 2022-05-26 NOTE — PROGRESS NOTES
Subjective   Kisha Mosher is a 67 y.o. female.     67-year-old female returns after suffering major depression from loss of her son-suicide      The following portions of the patient's history were reviewed and updated as appropriate: allergies, current medications, past family history, past medical history, past social history, past surgical history and problem list.    Review of Systems   Gastrointestinal:        History of adenomatous polyps-5 years ago-due colonoscopy   Psychiatric/Behavioral: Positive for dysphoric mood and sleep disturbance. Negative for suicidal ideas. The patient is nervous/anxious.        Objective   Physical Exam  Vitals and nursing note reviewed.   Constitutional:       Appearance: Normal appearance.   Eyes:      Extraocular Movements: Extraocular movements intact.      Pupils: Pupils are equal, round, and reactive to light.   Cardiovascular:      Rate and Rhythm: Normal rate and regular rhythm.   Pulmonary:      Effort: Pulmonary effort is normal.      Breath sounds: Normal breath sounds.   Musculoskeletal:      Right lower leg: No edema.      Left lower leg: No edema.   Skin:     General: Skin is warm and dry.   Neurological:      General: No focal deficit present.      Mental Status: She is alert and oriented to person, place, and time.   Psychiatric:         Mood and Affect: Mood normal.         Behavior: Behavior normal.         Thought Content: Thought content normal.         Judgment: Judgment normal.      Comments: Mood much improved-underwent counseling through work with a clinical psychologist- able to return to work full duty 5/23/2022         Assessment & Plan   Diagnoses and all orders for this visit:    1. Anxiety (Primary)    2. History of colonic polyps  -     Ambulatory Referral to General Surgery

## 2022-06-20 DIAGNOSIS — R09.82 POSTNASAL DRIP: ICD-10-CM

## 2022-06-20 RX ORDER — AZELASTINE 1 MG/ML
SPRAY, METERED NASAL
Qty: 90 EACH | OUTPATIENT
Start: 2022-06-20

## 2022-06-20 RX ORDER — AZELASTINE HYDROCHLORIDE 137 UG/1
2 SPRAY, METERED NASAL 2 TIMES DAILY
Qty: 90 ML | Refills: 0 | Status: SHIPPED | OUTPATIENT
Start: 2022-06-20 | End: 2022-09-19

## 2022-06-20 RX ORDER — MECLIZINE HYDROCHLORIDE 25 MG/1
TABLET ORAL
Qty: 90 TABLET | Refills: 0 | Status: SHIPPED | OUTPATIENT
Start: 2022-06-20

## 2022-06-20 NOTE — TELEPHONE ENCOUNTER
Rx Refill Note  Requested Prescriptions     Pending Prescriptions Disp Refills   • meclizine (ANTIVERT) 25 MG tablet [Pharmacy Med Name: MECLIZINE 25 MG TABLET] 90 tablet 0     Sig: TAKE 1 TABLET BY MOUTH 3 TIMES A DAY AS NEEDED FOR DIZZINESS.   • Azelastine HCl 137 MCG/SPRAY solution 90 mL 0      Last office visit with prescribing clinician: 3/22/2022      Next office visit with prescribing clinician: 7/18/2022     CPE done     {TIP  Please add Last Relevant Lab Date if appropriate: 01/17/2022  {TIP  Is Refill Pharmacy correct?: yes    Kim Duque MA  06/20/22, 16:02 CDT

## 2022-07-21 ENCOUNTER — OFFICE VISIT (OUTPATIENT)
Dept: FAMILY MEDICINE CLINIC | Facility: CLINIC | Age: 67
End: 2022-07-21

## 2022-07-21 VITALS
HEIGHT: 67 IN | SYSTOLIC BLOOD PRESSURE: 129 MMHG | BODY MASS INDEX: 29.22 KG/M2 | WEIGHT: 186.2 LBS | DIASTOLIC BLOOD PRESSURE: 80 MMHG | OXYGEN SATURATION: 98 % | TEMPERATURE: 96.6 F | HEART RATE: 82 BPM

## 2022-07-21 DIAGNOSIS — M54.6 ACUTE LEFT-SIDED THORACIC BACK PAIN: ICD-10-CM

## 2022-07-21 DIAGNOSIS — I10 ESSENTIAL HYPERTENSION: Primary | ICD-10-CM

## 2022-07-21 DIAGNOSIS — E78.2 MIXED HYPERLIPIDEMIA: ICD-10-CM

## 2022-07-21 PROCEDURE — 99213 OFFICE O/P EST LOW 20 MIN: CPT | Performed by: NURSE PRACTITIONER

## 2022-07-21 PROCEDURE — 96372 THER/PROPH/DIAG INJ SC/IM: CPT | Performed by: NURSE PRACTITIONER

## 2022-07-21 RX ORDER — TRIAMCINOLONE ACETONIDE 40 MG/ML
40 INJECTION, SUSPENSION INTRA-ARTICULAR; INTRAMUSCULAR ONCE
Status: COMPLETED | OUTPATIENT
Start: 2022-07-21 | End: 2022-07-21

## 2022-07-21 RX ORDER — PREDNISONE 10 MG/1
TABLET ORAL
Qty: 21 TABLET | Refills: 0 | Status: SHIPPED | OUTPATIENT
Start: 2022-07-21 | End: 2022-08-10

## 2022-07-21 RX ORDER — SODIUM PICOSULFATE, MAGNESIUM OXIDE, AND ANHYDROUS CITRIC ACID 10; 3.5; 12 MG/160ML; G/160ML; G/160ML
LIQUID ORAL
COMMUNITY
Start: 2022-06-16 | End: 2022-08-10

## 2022-07-21 RX ADMIN — TRIAMCINOLONE ACETONIDE 40 MG: 40 INJECTION, SUSPENSION INTRA-ARTICULAR; INTRAMUSCULAR at 10:10

## 2022-07-21 NOTE — PROGRESS NOTES
CC: 6 month check     History:  Kisha Mosher is a 67 y.o. female who presents today for evaluation of the above problems.      Patient states that she is doing well overall, other than left sided thoracic back pain.  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 The discomfort in midline from her left shoulder blade.  It is better when she is lying down.     Otherwise, she denies any current issues.  BP is appropriate and well controlled.     HPI  ROS:  Review of Systems   Constitutional: Negative for fever.   Respiratory: Negative for shortness of breath.    Cardiovascular: Negative for chest pain.   Musculoskeletal: Positive for back pain.       No Known Allergies  Past Medical History:   Diagnosis Date   • Abnormal ECG    • Essential hypertension 2019   • Fibroid uterus    • Glaucoma    • Hyperlipidemia      Past Surgical History:   Procedure Laterality Date   •  SECTION     • COLONOSCOPY  2016    [facility] [place]   • D & C HYSTEROSCOPY MYOSURE N/A 2020    Procedure: DILATATION AND CURETTAGE HYSTEROSCOPY WITH MYOSURE;  Surgeon: Dinh Syed MD;  Location: [facility];  Service: Obstetrics/Gynecology;  Laterality: N/A;   • EYE SURGERY Right    • JOINT REPLACEMENT     • TOTAL KNEE ARTHROPLASTY Right 2017   • TUBAL ABDOMINAL LIGATION       Family History   Problem Relation Age of Onset   • Breast cancer Sister    • Alzheimer's disease Mother    • Hypertension Mother    • No Known Problems Father    • No Known Problems Daughter    • No Known Problems Son    • No Known Problems Maternal Grandmother    • No Known Problems Maternal Grandfather    • No Known Problems Paternal Grandmother    • No Known Problems Paternal Grandfather    • Breast cancer Sister    • No Known Problems Daughter    • No Known Problems Son    • No Known Problems Son       reports that she has never smoked. She has never used smokeless tobacco. She reports that she does not  "drink alcohol and does not use drugs.      Current Outpatient Medications:   •  amLODIPine (NORVASC) 5 MG tablet, Take 1 tablet by mouth Daily., Disp: 90 tablet, Rfl: 3  •  atorvastatin (LIPITOR) 10 MG tablet, Take 1 tablet by mouth Daily., Disp: 90 tablet, Rfl: 1  •  Azelastine HCl 137 MCG/SPRAY solution, 2 sprays into the nostril(s) as directed by provider 2 (Two) Times a Day., Disp: 90 mL, Rfl: 0  •  buPROPion XL (Wellbutrin XL) 300 MG 24 hr tablet, Take 1 tablet by mouth Every Morning. (Patient taking differently: Take 150 mg by mouth Every Morning.), Disp: 90 tablet, Rfl: 3  •  fluticasone (FLONASE) 50 MCG/ACT nasal spray, SPRAY 1 SPRAY INTO EACH NOSTRIL TWICE A DAY (Patient taking differently: Daily As Needed.), Disp: 16 mL, Rfl: 2  •  indapamide (LOZOL) 1.25 MG tablet, TAKE 1 TABLET BY MOUTH EVERY DAY, Disp: 90 tablet, Rfl: 3  •  latanoprost (XALATAN) 0.005 % ophthalmic solution, Administer 1 drop to both eyes every night at bedtime., Disp: , Rfl:   •  levocetirizine (XYZAL) 5 MG tablet, Take 5 mg by mouth Daily., Disp: , Rfl:   •  meclizine (ANTIVERT) 25 MG tablet, TAKE 1 TABLET BY MOUTH 3 TIMES A DAY AS NEEDED FOR DIZZINESS., Disp: 90 tablet, Rfl: 0  •  Clenpiq 10-3.5-12 MG-GM -GM/160ML solution, PER INSTRUCTIONS, Disp: , Rfl:   •  predniSONE (DELTASONE) 10 MG (21) dose pack, Use as directed on package, Disp: 21 tablet, Rfl: 0    Current Facility-Administered Medications:   •  triamcinolone acetonide (KENALOG-40) injection 40 mg, 40 mg, Intramuscular, Once, Eden Luna, APRN    OBJECTIVE:  /80 (BP Location: Left arm, Patient Position: Sitting, Cuff Size: Large Adult)   Pulse 82   Temp 96.6 °F (35.9 °C) (Temporal)   Ht 170.2 cm (67\")   Wt 84.5 kg (186 lb 3.2 oz)   SpO2 98%   Breastfeeding No   BMI 29.16 kg/m²    Physical Exam  Vitals reviewed.   Constitutional:       Appearance: She is well-developed.   Cardiovascular:      Rate and Rhythm: Normal rate.   Pulmonary:      Effort: Pulmonary " effort is normal.   Neurological:      Mental Status: She is alert and oriented to person, place, and time.   Psychiatric:         Behavior: Behavior normal.         Assessment/Plan    Diagnoses and all orders for this visit:    1. Essential hypertension (Primary)  -     Comprehensive Metabolic Panel    2. Mixed hyperlipidemia  -     Lipid Panel    3. Acute left-sided thoracic back pain  -     triamcinolone acetonide (KENALOG-40) injection 40 mg  -     predniSONE (DELTASONE) 10 MG (21) dose pack; Use as directed on package  Dispense: 21 tablet; Refill: 0        An After Visit Summary was printed and given to the patient at discharge.  Return in about 6 months (around 1/21/2023), or if symptoms worsen or fail to improve, for Annual physical.       IVETT Francis 7/21/22    Electronically signed.

## 2022-07-22 ENCOUNTER — TELEPHONE (OUTPATIENT)
Dept: FAMILY MEDICINE CLINIC | Facility: CLINIC | Age: 67
End: 2022-07-22

## 2022-07-22 NOTE — TELEPHONE ENCOUNTER
Caller: Kisha Mosher    Relationship to patient: Self    Best call back number:  [phone]    Patient is needing: Pt said that she is taking a cruise on 8/19 and will be going out of the country. She is asking if  recommends getting another booster before travelling instead of waiting until fall as they had previously discussed.

## 2022-07-22 NOTE — TELEPHONE ENCOUNTER
Patient advised to ok to go ahead and receive booster.  If patient does not go to pharmacy to receive will call office back to schedule.

## 2022-07-23 LAB
ALBUMIN SERPL-MCNC: 4.7 G/DL (ref 3.5–5.2)
ALBUMIN/GLOB SERPL: 2.1 G/DL
ALP SERPL-CCNC: 105 U/L (ref 39–117)
ALT SERPL-CCNC: 23 U/L (ref 1–33)
AST SERPL-CCNC: 23 U/L (ref 1–32)
BILIRUB SERPL-MCNC: 0.4 MG/DL (ref 0–1.2)
BUN SERPL-MCNC: 13 MG/DL (ref 8–23)
BUN/CREAT SERPL: 17.8 (ref 7–25)
CALCIUM SERPL-MCNC: 9.8 MG/DL (ref 8.6–10.5)
CHLORIDE SERPL-SCNC: 103 MMOL/L (ref 98–107)
CHOLEST SERPL-MCNC: 172 MG/DL (ref 0–200)
CO2 SERPL-SCNC: 29 MMOL/L (ref 22–29)
CREAT SERPL-MCNC: 0.73 MG/DL (ref 0.57–1)
EGFRCR SERPLBLD CKD-EPI 2021: 91.4 ML/MIN/1.73
GLOBULIN SER CALC-MCNC: 2.2 GM/DL
GLUCOSE SERPL-MCNC: 86 MG/DL (ref 65–99)
HDLC SERPL-MCNC: 70 MG/DL (ref 40–60)
LDLC SERPL CALC-MCNC: 85 MG/DL (ref 0–100)
POTASSIUM SERPL-SCNC: 4.1 MMOL/L (ref 3.5–5.2)
PROT SERPL-MCNC: 6.9 G/DL (ref 6–8.5)
SODIUM SERPL-SCNC: 143 MMOL/L (ref 136–145)
TRIGL SERPL-MCNC: 95 MG/DL (ref 0–150)
VLDLC SERPL CALC-MCNC: 17 MG/DL (ref 5–40)

## 2022-08-10 ENCOUNTER — OFFICE VISIT (OUTPATIENT)
Dept: FAMILY MEDICINE CLINIC | Facility: CLINIC | Age: 67
End: 2022-08-10

## 2022-08-10 VITALS
HEART RATE: 76 BPM | HEIGHT: 67 IN | BODY MASS INDEX: 28.91 KG/M2 | TEMPERATURE: 98.4 F | SYSTOLIC BLOOD PRESSURE: 140 MMHG | WEIGHT: 184.2 LBS | DIASTOLIC BLOOD PRESSURE: 90 MMHG | OXYGEN SATURATION: 98 %

## 2022-08-10 DIAGNOSIS — M54.2 NECK PAIN: Primary | ICD-10-CM

## 2022-08-10 DIAGNOSIS — F41.9 ANXIETY: ICD-10-CM

## 2022-08-10 PROCEDURE — 99212 OFFICE O/P EST SF 10 MIN: CPT | Performed by: FAMILY MEDICINE

## 2022-08-10 NOTE — PROGRESS NOTES
Subjective   Kisha Mosher is a 67 y.o. female.     67-year-old female follow-up for anxiety and hypertension neck pain        The following portions of the patient's history were reviewed and updated as appropriate: allergies, current medications, past family history, past medical history, past social history, past surgical history and problem list.    Review of Systems   Cardiovascular: Negative for chest pain and leg swelling.   Musculoskeletal: Positive for arthralgias and neck pain.   Psychiatric/Behavioral: The patient is nervous/anxious.         Patient improving with anxiety since her son committed suicide-continue same medicine   All other systems reviewed and are negative.      Objective   Physical Exam  Vitals and nursing note reviewed.   Constitutional:       Appearance: Normal appearance.   Cardiovascular:      Rate and Rhythm: Normal rate and regular rhythm.   Pulmonary:      Effort: Pulmonary effort is normal.      Breath sounds: Normal breath sounds.   Abdominal:      General: Abdomen is flat.      Palpations: Abdomen is soft.   Musculoskeletal:         General: Tenderness present.      Right lower leg: No edema.      Left lower leg: No edema.      Comments: Tenderness base of left neck   Neurological:      General: No focal deficit present.      Mental Status: She is alert and oriented to person, place, and time.   Psychiatric:         Mood and Affect: Mood normal.         Behavior: Behavior normal.         Thought Content: Thought content normal.         Judgment: Judgment normal.         Assessment & Plan   Diagnoses and all orders for this visit:    1. Neck pain (Primary)    2. Anxiety         Plan-continue anxiety medicine, no change in anxiety therapy- neck posture stressed

## 2022-08-17 ENCOUNTER — OFFICE VISIT (OUTPATIENT)
Dept: FAMILY MEDICINE CLINIC | Facility: CLINIC | Age: 67
End: 2022-08-17

## 2022-08-17 VITALS
TEMPERATURE: 97.1 F | BODY MASS INDEX: 28.88 KG/M2 | DIASTOLIC BLOOD PRESSURE: 80 MMHG | SYSTOLIC BLOOD PRESSURE: 130 MMHG | HEIGHT: 67 IN | HEART RATE: 88 BPM | OXYGEN SATURATION: 97 % | WEIGHT: 184 LBS | RESPIRATION RATE: 18 BRPM

## 2022-08-17 DIAGNOSIS — I10 PRIMARY HYPERTENSION: Primary | ICD-10-CM

## 2022-08-17 PROCEDURE — 99213 OFFICE O/P EST LOW 20 MIN: CPT | Performed by: FAMILY MEDICINE

## 2022-08-17 RX ORDER — VALACYCLOVIR HYDROCHLORIDE 500 MG/1
TABLET, FILM COATED ORAL
Qty: 90 TABLET | Refills: 1 | Status: SHIPPED | OUTPATIENT
Start: 2022-08-17 | End: 2023-02-17

## 2022-08-17 NOTE — PROGRESS NOTES
Subjective   Kisha Mosher is a 67 y.o. female.     67-year-old with labile hypertension related to stress recent loss of her son etc.      The following portions of the patient's history were reviewed and updated as appropriate: allergies, current medications, past family history, past medical history, past social history, past surgical history and problem list.    Review of Systems   Respiratory: Negative for shortness of breath.    Cardiovascular: Negative for chest pain and leg swelling.   Skin:        Current fever blisters   Neurological: Negative for dizziness and headaches.   Psychiatric/Behavioral: The patient is nervous/anxious.        Objective   Physical Exam  Vitals and nursing note reviewed.   Constitutional:       Appearance: Normal appearance.   Cardiovascular:      Rate and Rhythm: Normal rate and regular rhythm.   Pulmonary:      Effort: Pulmonary effort is normal.      Breath sounds: Normal breath sounds.   Musculoskeletal:      Right lower leg: No edema.      Left lower leg: No edema.   Neurological:      General: No focal deficit present.      Mental Status: She is alert and oriented to person, place, and time.   Psychiatric:         Behavior: Behavior normal.         Thought Content: Thought content normal.         Judgment: Judgment normal.      Comments: Mood is down         Assessment & Plan   Diagnoses and all orders for this visit:    1. Primary hypertension (Primary)    Other orders  -     valACYclovir (VALTREX) 500 MG tablet; 1 daily-maintenance  Dispense: 90 tablet; Refill: 1    Herpes labialis  Plan stress management-no work statement given to return to work 29 August-we will prescribe Valtrex 500 maintenance 1 a day for recurrent fever blisters

## 2022-09-01 ENCOUNTER — TELEPHONE (OUTPATIENT)
Dept: FAMILY MEDICINE CLINIC | Facility: CLINIC | Age: 67
End: 2022-09-01

## 2022-09-01 NOTE — TELEPHONE ENCOUNTER
Caller: NIALL WITH [employer]     Relationship: NIALL     Best call back number: [phone]    What form or medical record are you requesting: DISABILITY FORM   Who is requesting this form or medical record from you: CLINICAL     How would you like to receive the form or medical records (pick-up, mail, fax): FAX   If fax, what is the fax number:[phone]  If mail, what is the address:   If pick-up, provide patient with address and location details    Timeframe paperwork needed: 09/07/22     Additional notes: STATES THE FAX WAS SENT TO THE OFFICE ON AUGUST 18

## 2022-09-18 DIAGNOSIS — R09.82 POSTNASAL DRIP: ICD-10-CM

## 2022-09-19 RX ORDER — AZELASTINE HYDROCHLORIDE 137 UG/1
SPRAY, METERED NASAL
Qty: 30 ML | Refills: 3 | Status: SHIPPED | OUTPATIENT
Start: 2022-09-19 | End: 2022-12-15

## 2022-09-19 NOTE — TELEPHONE ENCOUNTER
Rx Refill Note  Requested Prescriptions     Pending Prescriptions Disp Refills   • Azelastine HCl 137 MCG/SPRAY solution [Pharmacy Med Name: AZELASTINE 0.1% (137 MCG) SPRY]       Sig: USE 2 SPRAY INTO EACH NOSTIL 2 TIMES DAILY      Last office visit with prescribing clinician: 8/17/22  Next office visit with prescribing clinician: 1/18/2023          {TIP  Is Refill Pharmacy correct?:23}  Kim Baker MA  09/19/22, 07:40 CDT

## 2022-12-15 DIAGNOSIS — R09.82 POSTNASAL DRIP: ICD-10-CM

## 2022-12-15 RX ORDER — AZELASTINE HYDROCHLORIDE 137 UG/1
SPRAY, METERED NASAL
Qty: 30 ML | Refills: 1 | Status: SHIPPED | OUTPATIENT
Start: 2022-12-15 | End: 2023-01-12

## 2022-12-15 NOTE — TELEPHONE ENCOUNTER
Rx Refill Note  Requested Prescriptions     Pending Prescriptions Disp Refills   • Azelastine HCl 137 MCG/SPRAY solution [Pharmacy Med Name: AZELASTINE 0.1% (137 MCG) SPRY] 30 mL 1     Sig: USE 2 SPRAY INTO EACH NOSTIL 2 TIMES DAILY      Last office visit with prescribing clinician: 8/17/2022   Last telemedicine visit with prescribing clinician: 1/18/2023   Next office visit with prescribing clinician: Visit date not found                         Would you like a call back once the refill request has been completed: [] Yes [] No    If the office needs to give you a call back, can they leave a voicemail: [] Yes [] No    Kim Baker MA  12/15/22, 07:44 CST

## 2023-01-12 DIAGNOSIS — R09.82 POSTNASAL DRIP: ICD-10-CM

## 2023-01-12 RX ORDER — AZELASTINE HYDROCHLORIDE 137 UG/1
SPRAY, METERED NASAL
Qty: 30 ML | Refills: 0 | Status: SHIPPED | OUTPATIENT
Start: 2023-01-12 | End: 2023-02-13

## 2023-01-12 NOTE — TELEPHONE ENCOUNTER
Rx Refill Note  Requested Prescriptions     Pending Prescriptions Disp Refills   • Azelastine HCl 137 MCG/SPRAY solution [Pharmacy Med Name: AZELASTINE 0.1% (137 MCG) SPRY]  1     Sig: USE 2 SPRAY INTO EACH NOSTIL 2 TIMES DAILY      Last office visit with prescribing clinician: 8/17/2022   Last telemedicine visit with prescribing clinician: 1/20/2023   Next office visit with prescribing clinician: Visit date not found   CPE done 01/17/2022  Up coming appt on 01/20/2023    {TIP  Please add Last Relevant Lab Date if appropriate: 01/17/2022             {TIP  Is Refill Pharmacy correct?: yes      Would you like a call back once the refill request has been completed: [] Yes [] No    If the office needs to give you a call back, can they leave a voicemail: [] Yes [] No    Kim Duque MA  01/12/23, 11:29 CST

## 2023-01-20 ENCOUNTER — OFFICE VISIT (OUTPATIENT)
Dept: FAMILY MEDICINE CLINIC | Facility: CLINIC | Age: 68
End: 2023-01-20
Payer: COMMERCIAL

## 2023-01-20 VITALS
TEMPERATURE: 97.4 F | SYSTOLIC BLOOD PRESSURE: 112 MMHG | HEIGHT: 67 IN | OXYGEN SATURATION: 98 % | HEART RATE: 76 BPM | BODY MASS INDEX: 28.88 KG/M2 | DIASTOLIC BLOOD PRESSURE: 76 MMHG | WEIGHT: 184 LBS

## 2023-01-20 DIAGNOSIS — R53.83 FATIGUE, UNSPECIFIED TYPE: ICD-10-CM

## 2023-01-20 DIAGNOSIS — Z12.31 ENCOUNTER FOR SCREENING MAMMOGRAM FOR MALIGNANT NEOPLASM OF BREAST: ICD-10-CM

## 2023-01-20 DIAGNOSIS — Z23 ENCOUNTER FOR IMMUNIZATION: ICD-10-CM

## 2023-01-20 DIAGNOSIS — E78.2 MIXED HYPERLIPIDEMIA: ICD-10-CM

## 2023-01-20 DIAGNOSIS — Z12.4 CERVICAL CANCER SCREENING: ICD-10-CM

## 2023-01-20 DIAGNOSIS — Z78.0 POSTMENOPAUSAL: ICD-10-CM

## 2023-01-20 DIAGNOSIS — I10 ESSENTIAL HYPERTENSION: ICD-10-CM

## 2023-01-20 DIAGNOSIS — M26.621 ARTHRALGIA OF RIGHT TEMPOROMANDIBULAR JOINT: ICD-10-CM

## 2023-01-20 DIAGNOSIS — Z00.00 ANNUAL PHYSICAL EXAM: Primary | ICD-10-CM

## 2023-01-20 LAB
BILIRUB BLD-MCNC: NEGATIVE MG/DL
CLARITY, POC: CLEAR
COLOR UR: YELLOW
GLUCOSE UR STRIP-MCNC: NEGATIVE MG/DL
KETONES UR QL: NEGATIVE
LEUKOCYTE EST, POC: ABNORMAL
NITRITE UR-MCNC: NEGATIVE MG/ML
PH UR: 5.5 [PH] (ref 5–8)
PROT UR STRIP-MCNC: NEGATIVE MG/DL
RBC # UR STRIP: ABNORMAL /UL
SP GR UR: 1.02 (ref 1–1.03)
UROBILINOGEN UR QL: ABNORMAL

## 2023-01-20 PROCEDURE — 99397 PER PM REEVAL EST PAT 65+ YR: CPT | Performed by: NURSE PRACTITIONER

## 2023-01-20 PROCEDURE — 81003 URINALYSIS AUTO W/O SCOPE: CPT | Performed by: NURSE PRACTITIONER

## 2023-01-20 RX ORDER — AMLODIPINE BESYLATE 5 MG/1
5 TABLET ORAL DAILY
COMMUNITY

## 2023-01-20 RX ORDER — PREDNISONE 10 MG/1
TABLET ORAL
Qty: 21 TABLET | Refills: 0 | Status: SHIPPED | OUTPATIENT
Start: 2023-01-20

## 2023-01-20 RX ORDER — ATORVASTATIN CALCIUM 20 MG/1
20 TABLET, FILM COATED ORAL DAILY
COMMUNITY
Start: 2022-12-07 | End: 2023-03-07

## 2023-01-20 RX ORDER — BUPROPION HYDROCHLORIDE 150 MG/1
150 TABLET ORAL DAILY
COMMUNITY
Start: 2022-12-08 | End: 2023-03-08

## 2023-01-20 NOTE — PROGRESS NOTES
CC: annual physical    History:  Kisha Mosher is a 67 y.o. female who presents today for evaluation of the above problems.      Patient reports that she is doing well. She denies any current issues other than right sided TMJ pain.  She is due for mammogram and bone density scan. Colon screening is up to date. She is due for a pneumonia vaccination and COVID booster.         HPI  ROS:  Review of Systems   Respiratory: Negative for shortness of breath.    Cardiovascular: Negative for chest pain.   Gastrointestinal: Negative for constipation and diarrhea.   Musculoskeletal: Positive for arthralgias (right sided TMJ).   Neurological: Negative for dizziness, light-headedness and headaches.       No Known Allergies  Past Medical History:   Diagnosis Date   • Abnormal ECG    • Anxiety 3/25/22   • Arthritis    • Cataract    • Colon polyp    • Depression 24   • Essential hypertension 2019   • Fibroid uterus    • Glaucoma    • Hyperlipidemia      Past Surgical History:   Procedure Laterality Date   •  SECTION     • COLONOSCOPY  2016    [facility] [place] DR TRUJILLO   • D & C HYSTEROSCOPY MYOSURE N/A 2020    Procedure: DILATATION AND CURETTAGE HYSTEROSCOPY WITH MYOSURE;  Surgeon: Dinh Syed MD;  Location: [facility];  Service: Obstetrics/Gynecology;  Laterality: N/A;   • EYE SURGERY Right    • JOINT REPLACEMENT     • TOTAL KNEE ARTHROPLASTY Right 2017   • TUBAL ABDOMINAL LIGATION       Family History   Problem Relation Age of Onset   • Breast cancer Sister    • Alzheimer's disease Mother    • Hypertension Mother    • No Known Problems Father    • No Known Problems Daughter    • No Known Problems Son    • No Known Problems Maternal Grandmother    • No Known Problems Maternal Grandfather    • No Known Problems Paternal Grandmother    • No Known Problems Paternal Grandfather    • Breast cancer Sister    • No Known Problems Daughter    • No Known Problems Son    • No Known Problems Son   "     reports that she has never smoked. She has never used smokeless tobacco. She reports that she does not drink alcohol and does not use drugs.      Current Outpatient Medications:   •  amLODIPine (NORVASC) 5 MG tablet, Take 5 mg by mouth Daily., Disp: , Rfl:   •  atorvastatin (LIPITOR) 20 MG tablet, Take 20 mg by mouth Daily., Disp: , Rfl:   •  Azelastine HCl 137 MCG/SPRAY solution, USE 2 SPRAY INTO EACH NOSTIL 2 TIMES DAILY, Disp: 30 mL, Rfl: 0  •  buPROPion XL (WELLBUTRIN XL) 150 MG 24 hr tablet, Take 150 mg by mouth Daily., Disp: , Rfl:   •  fluticasone (FLONASE) 50 MCG/ACT nasal spray, SPRAY 1 SPRAY INTO EACH NOSTRIL TWICE A DAY (Patient taking differently: Daily As Needed.), Disp: 16 mL, Rfl: 2  •  indapamide (LOZOL) 1.25 MG tablet, TAKE 1 TABLET BY MOUTH EVERY DAY, Disp: 90 tablet, Rfl: 3  •  latanoprost (XALATAN) 0.005 % ophthalmic solution, Administer 1 drop to both eyes every night at bedtime., Disp: , Rfl:   •  levocetirizine (XYZAL) 5 MG tablet, Take 5 mg by mouth Daily., Disp: , Rfl:   •  meclizine (ANTIVERT) 25 MG tablet, TAKE 1 TABLET BY MOUTH 3 TIMES A DAY AS NEEDED FOR DIZZINESS., Disp: 90 tablet, Rfl: 0  •  valACYclovir (VALTREX) 500 MG tablet, 1 daily-maintenance, Disp: 90 tablet, Rfl: 1  •  predniSONE (DELTASONE) 10 MG (21) dose pack, Use as directed on package, Disp: 21 tablet, Rfl: 0    OBJECTIVE:  /76 (BP Location: Left arm, Patient Position: Sitting, Cuff Size: Adult)   Pulse 76   Temp 97.4 °F (36.3 °C) (Temporal)   Ht 170.2 cm (67\")   Wt 83.5 kg (184 lb)   SpO2 98%   BMI 28.82 kg/m²    Physical Exam  Vitals reviewed.   Constitutional:       Appearance: She is well-developed.   HENT:      Right Ear: Tympanic membrane, ear canal and external ear normal.      Left Ear: Tympanic membrane, ear canal and external ear normal.      Mouth/Throat:      Mouth: Mucous membranes are moist.      Pharynx: Oropharynx is clear.   Neck:      Vascular: No carotid bruit.   Cardiovascular:      " Rate and Rhythm: Normal rate and regular rhythm.      Heart sounds: Normal heart sounds.   Pulmonary:      Effort: Pulmonary effort is normal.      Breath sounds: Normal breath sounds.   Chest:   Breasts:     Right: Normal.      Left: Normal.   Abdominal:      General: Abdomen is flat. Bowel sounds are normal.      Palpations: Abdomen is soft.      Hernia: There is no hernia in the left inguinal area or right inguinal area.   Genitourinary:     Labia:         Right: No rash, tenderness, lesion or injury.         Left: No rash, tenderness, lesion or injury.       Cervix: Friability present.      Uterus: Normal.       Adnexa: Right adnexa normal and left adnexa normal.      Comments: Grade 2 cystocele noted  Lymphadenopathy:      Lower Body: No right inguinal adenopathy. No left inguinal adenopathy.   Neurological:      Mental Status: She is alert and oriented to person, place, and time.   Psychiatric:         Behavior: Behavior normal.         Assessment/Plan    Diagnoses and all orders for this visit:    1. Annual physical exam (Primary)  -     CBC & Differential  -     TSH  -     T4, free  -     Comprehensive Metabolic Panel  -     Lipid Panel  -     POC Urinalysis Dipstick, Multipro  -     Cancel: Mammo Screening Digital Tomosynthesis Bilateral With CAD; Future  -     Cancel: DEXA Bone Density Axial; Future  -     Mammo Screening Digital Tomosynthesis Bilateral With CAD; Future  -     DEXA Bone Density Axial; Future    2. Essential hypertension  -     Comprehensive Metabolic Panel  -     POC Urinalysis Dipstick, Multipro    3. Mixed hyperlipidemia  -     Lipid Panel    4. Encounter for screening mammogram for malignant neoplasm of breast  -     Cancel: Mammo Screening Digital Tomosynthesis Bilateral With CAD; Future  -     Mammo Screening Digital Tomosynthesis Bilateral With CAD; Future    5. Postmenopausal  -     Cancel: DEXA Bone Density Axial; Future  -     DEXA Bone Density Axial; Future    6. Encounter for  immunization    7. Fatigue, unspecified type  -     CBC & Differential  -     TSH  -     T4, free  -     POC Urinalysis Dipstick, Multipro    8. Arthralgia of right temporomandibular joint  -     predniSONE (DELTASONE) 10 MG (21) dose pack; Use as directed on package  Dispense: 21 tablet; Refill: 0    9. Cervical cancer screening  -     IGP, Aptima HPV, Rfx 16 / 18,45    HEALTH MAINTENANCE  Patient to schedule COVID and pneumonia booster. Mammogram and bone density ordered.     An After Visit Summary was printed and given to the patient at discharge.  Return in about 6 months (around 7/20/2023) for Next scheduled follow up.       Eden Lnua, IVETT 1/20/23    Electronically signed.

## 2023-01-21 LAB
ALBUMIN SERPL-MCNC: 4.6 G/DL (ref 3.5–5.2)
ALBUMIN/GLOB SERPL: 1.9 G/DL
ALP SERPL-CCNC: 109 U/L (ref 39–117)
ALT SERPL-CCNC: 21 U/L (ref 1–33)
AST SERPL-CCNC: 24 U/L (ref 1–32)
BASOPHILS # BLD AUTO: 0.06 10*3/MM3 (ref 0–0.2)
BASOPHILS NFR BLD AUTO: 1.3 % (ref 0–1.5)
BILIRUB SERPL-MCNC: 0.5 MG/DL (ref 0–1.2)
BUN SERPL-MCNC: 13 MG/DL (ref 8–23)
BUN/CREAT SERPL: 17.1 (ref 7–25)
CALCIUM SERPL-MCNC: 10.1 MG/DL (ref 8.6–10.5)
CHLORIDE SERPL-SCNC: 103 MMOL/L (ref 98–107)
CHOLEST SERPL-MCNC: 181 MG/DL (ref 0–200)
CO2 SERPL-SCNC: 31.6 MMOL/L (ref 22–29)
CREAT SERPL-MCNC: 0.76 MG/DL (ref 0.57–1)
EGFRCR SERPLBLD CKD-EPI 2021: 87.1 ML/MIN/1.73
EOSINOPHIL # BLD AUTO: 0.07 10*3/MM3 (ref 0–0.4)
EOSINOPHIL NFR BLD AUTO: 1.5 % (ref 0.3–6.2)
ERYTHROCYTE [DISTWIDTH] IN BLOOD BY AUTOMATED COUNT: 13.2 % (ref 12.3–15.4)
GLOBULIN SER CALC-MCNC: 2.4 GM/DL
GLUCOSE SERPL-MCNC: 87 MG/DL (ref 65–99)
HCT VFR BLD AUTO: 45.4 % (ref 34–46.6)
HDLC SERPL-MCNC: 73 MG/DL (ref 40–60)
HGB BLD-MCNC: 15 G/DL (ref 12–15.9)
IMM GRANULOCYTES # BLD AUTO: 0.01 10*3/MM3 (ref 0–0.05)
IMM GRANULOCYTES NFR BLD AUTO: 0.2 % (ref 0–0.5)
LDLC SERPL CALC-MCNC: 93 MG/DL (ref 0–100)
LYMPHOCYTES # BLD AUTO: 1.81 10*3/MM3 (ref 0.7–3.1)
LYMPHOCYTES NFR BLD AUTO: 38.1 % (ref 19.6–45.3)
MCH RBC QN AUTO: 30.9 PG (ref 26.6–33)
MCHC RBC AUTO-ENTMCNC: 33 G/DL (ref 31.5–35.7)
MCV RBC AUTO: 93.4 FL (ref 79–97)
MONOCYTES # BLD AUTO: 0.44 10*3/MM3 (ref 0.1–0.9)
MONOCYTES NFR BLD AUTO: 9.3 % (ref 5–12)
NEUTROPHILS # BLD AUTO: 2.36 10*3/MM3 (ref 1.7–7)
NEUTROPHILS NFR BLD AUTO: 49.6 % (ref 42.7–76)
NRBC BLD AUTO-RTO: 0 /100 WBC (ref 0–0.2)
PLATELET # BLD AUTO: 209 10*3/MM3 (ref 140–450)
POTASSIUM SERPL-SCNC: 4.6 MMOL/L (ref 3.5–5.2)
PROT SERPL-MCNC: 7 G/DL (ref 6–8.5)
RBC # BLD AUTO: 4.86 10*6/MM3 (ref 3.77–5.28)
SODIUM SERPL-SCNC: 143 MMOL/L (ref 136–145)
T4 FREE SERPL-MCNC: 1.26 NG/DL (ref 0.93–1.7)
TRIGL SERPL-MCNC: 83 MG/DL (ref 0–150)
TSH SERPL DL<=0.005 MIU/L-ACNC: 2.31 UIU/ML (ref 0.27–4.2)
VLDLC SERPL CALC-MCNC: 15 MG/DL (ref 5–40)
WBC # BLD AUTO: 4.75 10*3/MM3 (ref 3.4–10.8)

## 2023-01-26 LAB
CYTOLOGIST CVX/VAG CYTO: ABNORMAL
CYTOLOGY CVX/VAG DOC CYTO: ABNORMAL
CYTOLOGY CVX/VAG DOC THIN PREP: ABNORMAL
DX ICD CODE: ABNORMAL
HIV 1 & 2 AB SER-IMP: ABNORMAL
HPV GENOTYPE REFLEX: ABNORMAL
HPV I/H RISK 4 DNA CVX QL PROBE+SIG AMP: POSITIVE
HPV16 DNA CVX QL PROBE+SIG AMP: NEGATIVE
HPV18+45 E6+E7 MRNA CVX QL NAA+PROBE: NEGATIVE
OTHER STN SPEC: ABNORMAL
STAT OF ADQ CVX/VAG CYTO-IMP: ABNORMAL

## 2023-01-26 NOTE — PROGRESS NOTES
Cervical cells appear normal, but HPV is still positive. Will need to see gynecology for further evaluation.

## 2023-02-11 DIAGNOSIS — R09.82 POSTNASAL DRIP: ICD-10-CM

## 2023-02-13 RX ORDER — AZELASTINE HYDROCHLORIDE 137 UG/1
SPRAY, METERED NASAL
Qty: 30 ML | Refills: 3 | Status: SHIPPED | OUTPATIENT
Start: 2023-02-13

## 2023-02-13 NOTE — TELEPHONE ENCOUNTER
Rx Refill Note  Requested Prescriptions     Pending Prescriptions Disp Refills   • Azelastine HCl 137 MCG/SPRAY solution [Pharmacy Med Name: AZELASTINE 0.1% (137 MCG) SPRY]       Sig: USE 2 SPRAY INTO EACH NOSTIL 2 TIMES DAILY      Last office visit with prescribing clinician: 1/20/2023   Last telemedicine visit with prescribing clinician: 2/21/2023   Next office visit with prescribing clinician: 7/21/2023                         Would you like a call back once the refill request has been completed: [] Yes [] No    If the office needs to give you a call back, can they leave a voicemail: [] Yes [] No    Kim Baker MA  02/13/23, 07:34 CST

## 2023-02-17 RX ORDER — VALACYCLOVIR HYDROCHLORIDE 500 MG/1
TABLET, FILM COATED ORAL
Qty: 90 TABLET | Refills: 1 | Status: SHIPPED | OUTPATIENT
Start: 2023-02-17

## 2023-02-17 NOTE — TELEPHONE ENCOUNTER
Rx Refill Note  Requested Prescriptions     Pending Prescriptions Disp Refills   • valACYclovir (VALTREX) 500 MG tablet [Pharmacy Med Name: VALACYCLOVIR  MG TABLET] 90 tablet 1     Sig: TAKE 1 TABLET BY MOUTH EVERY DAY      Last office visit with prescribing clinician: 1/20/23  Last telemedicine visit with prescribing clinician: 7/21/2023   Next office visit with prescribing clinician: 7/21/23                        Would you like a call back once the refill request has been completed: [] Yes [] No    If the office needs to give you a call back, can they leave a voicemail: [] Yes [] No    Kim Baker MA  02/17/23, 07:33 CST

## 2023-03-07 DIAGNOSIS — E78.2 MIXED HYPERLIPIDEMIA: Primary | ICD-10-CM

## 2023-03-07 DIAGNOSIS — I10 ESSENTIAL HYPERTENSION: ICD-10-CM

## 2023-03-07 RX ORDER — INDAPAMIDE 1.25 MG/1
TABLET, FILM COATED ORAL
Qty: 90 TABLET | Refills: 3 | Status: SHIPPED | OUTPATIENT
Start: 2023-03-07

## 2023-03-07 RX ORDER — ATORVASTATIN CALCIUM 20 MG/1
TABLET, FILM COATED ORAL
Qty: 90 TABLET | Refills: 3 | Status: SHIPPED | OUTPATIENT
Start: 2023-03-07

## 2023-03-07 NOTE — TELEPHONE ENCOUNTER
Rx Refill Note  Requested Prescriptions     Pending Prescriptions Disp Refills   • atorvastatin (LIPITOR) 20 MG tablet [Pharmacy Med Name: ATORVASTATIN 20 MG TABLET] 90 tablet 3     Sig: TAKE 1 TABLET BY MOUTH EVERY DAY   • indapamide (LOZOL) 1.25 MG tablet [Pharmacy Med Name: INDAPAMIDE 1.25 MG TABLET] 90 tablet 3     Sig: TAKE 1 TABLET BY MOUTH EVERY DAY      Last office visit with prescribing clinician: 1/20/2023   Last telemedicine visit with prescribing clinician: 7/21/2023   Next office visit with prescribing clinician: 7/21/2023   {  Kim Baker MA  03/07/23, 07:28 CST

## 2023-03-08 DIAGNOSIS — F41.9 ANXIETY: Primary | ICD-10-CM

## 2023-03-08 RX ORDER — BUPROPION HYDROCHLORIDE 150 MG/1
TABLET ORAL
Qty: 90 TABLET | Refills: 3 | Status: SHIPPED | OUTPATIENT
Start: 2023-03-08

## 2023-03-08 NOTE — TELEPHONE ENCOUNTER
Rx Refill Note  Requested Prescriptions     Pending Prescriptions Disp Refills   • buPROPion XL (WELLBUTRIN XL) 150 MG 24 hr tablet [Pharmacy Med Name: BUPROPION HCL  MG TABLET] 90 tablet 3     Sig: TAKE 1 TABLET BY MOUTH EVERY DAY      Last office visit with prescribing clinician: 1/20/23  Last telemedicine visit with prescribing clinician: 7/21/2023   Next office visit with prescribing clinician: Visit date not found   Kim Baker MA  03/08/23, 07:32 CST

## 2023-04-12 RX ORDER — ALENDRONATE SODIUM 70 MG/1
70 TABLET ORAL
Qty: 12 TABLET | Refills: 3 | Status: SHIPPED | OUTPATIENT
Start: 2023-04-12

## 2023-04-12 NOTE — PROGRESS NOTES
Bones are getting weaker. Now has osteopenia, which comes before osteoporosis. Start fosamax once a week to strengthen bones.

## 2023-04-25 DIAGNOSIS — I10 ESSENTIAL (PRIMARY) HYPERTENSION: ICD-10-CM

## 2023-04-25 RX ORDER — AMLODIPINE BESYLATE 5 MG/1
TABLET ORAL
Qty: 90 TABLET | Refills: 3 | Status: SHIPPED | OUTPATIENT
Start: 2023-04-25

## 2023-04-25 NOTE — TELEPHONE ENCOUNTER
Rx Refill Note  Requested Prescriptions     Pending Prescriptions Disp Refills   • amLODIPine (NORVASC) 5 MG tablet [Pharmacy Med Name: AMLODIPINE BESYLATE 5 MG TAB] 90 tablet 3     Sig: TAKE 1 TABLET BY MOUTH EVERY DAY      Last office visit with prescribing clinician: 1/20/23  Last telemedicine visit with prescribing clinician: 7/21/2023   Next office visit with prescribing clinician: Visit date not found       {TIP  Please add Last Relevant Lab 1/20/23  Kim Baker MA  04/25/23, 07:26 CDT

## 2023-05-12 ENCOUNTER — OFFICE VISIT (OUTPATIENT)
Dept: FAMILY MEDICINE CLINIC | Facility: CLINIC | Age: 68
End: 2023-05-12
Payer: COMMERCIAL

## 2023-05-12 VITALS
DIASTOLIC BLOOD PRESSURE: 73 MMHG | HEIGHT: 67 IN | SYSTOLIC BLOOD PRESSURE: 124 MMHG | BODY MASS INDEX: 29.03 KG/M2 | OXYGEN SATURATION: 95 % | TEMPERATURE: 97.5 F | WEIGHT: 185 LBS | HEART RATE: 105 BPM

## 2023-05-12 DIAGNOSIS — J01.00 ACUTE NON-RECURRENT MAXILLARY SINUSITIS: Primary | ICD-10-CM

## 2023-05-12 DIAGNOSIS — R05.1 ACUTE COUGH: ICD-10-CM

## 2023-05-12 LAB
EXPIRATION DATE: NORMAL
FLUAV AG UPPER RESP QL IA.RAPID: NOT DETECTED
FLUBV AG UPPER RESP QL IA.RAPID: NOT DETECTED
INTERNAL CONTROL: NORMAL
Lab: NORMAL
SARS-COV-2 AG UPPER RESP QL IA.RAPID: NOT DETECTED

## 2023-05-12 RX ORDER — AMOXICILLIN AND CLAVULANATE POTASSIUM 875; 125 MG/1; MG/1
1 TABLET, FILM COATED ORAL 2 TIMES DAILY
Qty: 14 TABLET | Refills: 0 | Status: SHIPPED | OUTPATIENT
Start: 2023-05-12

## 2023-05-12 RX ORDER — TRIAMCINOLONE ACETONIDE 40 MG/ML
40 INJECTION, SUSPENSION INTRA-ARTICULAR; INTRAMUSCULAR ONCE
Status: COMPLETED | OUTPATIENT
Start: 2023-05-12 | End: 2023-05-12

## 2023-05-12 RX ADMIN — TRIAMCINOLONE ACETONIDE 40 MG: 40 INJECTION, SUSPENSION INTRA-ARTICULAR; INTRAMUSCULAR at 09:50

## 2023-05-12 NOTE — PROGRESS NOTES
CC: cough, congestion    History:  Kisha Mosher is a 68 y.o. female who presents today for evaluation of the above problems.      Had COVID a month ago and doesn't feel like it ever got all the way better.  Symptoms worsened last weekend and she had a low-grade fever.  Complains of cough, congestion, very thick nasal and postnasal drainage.  Went to [facility] last weekend and Medrol dose pack was ordered.  She has also been taking Mucinex.  These have helped some.        HPI  ROS:  Review of Systems   Constitutional: Negative for fever.   HENT: Positive for congestion, ear pain, postnasal drip, sinus pressure and sore throat.    Respiratory: Positive for cough.        No Known Allergies  Past Medical History:   Diagnosis Date   • Abnormal ECG    • Anxiety 3/25/22   • Arthritis    • Cataract    • Colon polyp    • Depression 24   • Essential hypertension 2019   • Fibroid uterus    • Glaucoma    • Hyperlipidemia      Past Surgical History:   Procedure Laterality Date   •  SECTION     • COLONOSCOPY  2016    [facility] [place] [address] [facility]   • D & C HYSTEROSCOPY MYOSURE N/A 2020    Procedure: DILATATION AND CURETTAGE HYSTEROSCOPY WITH MYOSURE;  Surgeon: Dinh Syed MD;  Location: [facility];  Service: Obstetrics/Gynecology;  Laterality: N/A;   • EYE SURGERY Right    • JOINT REPLACEMENT     • TOTAL KNEE ARTHROPLASTY Right 2017   • TUBAL ABDOMINAL LIGATION       Family History   Problem Relation Age of Onset   • Breast cancer Sister    • Alzheimer's disease Mother    • Hypertension Mother    • No Known Problems Father    • No Known Problems Daughter    • No Known Problems Son    • No Known Problems Maternal Grandmother    • No Known Problems Maternal Grandfather    • No Known Problems Paternal Grandmother    • No Known Problems Paternal Grandfather    • Breast cancer Sister    • No Known Problems Daughter    • No Known Problems Son    • No Known Problems Son       reports that she  "has never smoked. She has never used smokeless tobacco. She reports that she does not drink alcohol and does not use drugs.      Current Outpatient Medications:   •  alendronate (Fosamax) 70 MG tablet, Take 1 tablet by mouth Every 7 (Seven) Days., Disp: 12 tablet, Rfl: 3  •  amLODIPine (NORVASC) 5 MG tablet, TAKE 1 TABLET BY MOUTH EVERY DAY, Disp: 90 tablet, Rfl: 3  •  atorvastatin (LIPITOR) 20 MG tablet, TAKE 1 TABLET BY MOUTH EVERY DAY, Disp: 90 tablet, Rfl: 3  •  Azelastine HCl 137 MCG/SPRAY solution, USE 2 SPRAY INTO EACH NOSTIL 2 TIMES DAILY, Disp: 30 mL, Rfl: 3  •  buPROPion XL (WELLBUTRIN XL) 150 MG 24 hr tablet, TAKE 1 TABLET BY MOUTH EVERY DAY, Disp: 90 tablet, Rfl: 3  •  fluticasone (FLONASE) 50 MCG/ACT nasal spray, SPRAY 1 SPRAY INTO EACH NOSTRIL TWICE A DAY (Patient taking differently: Daily As Needed.), Disp: 16 mL, Rfl: 2  •  indapamide (LOZOL) 1.25 MG tablet, TAKE 1 TABLET BY MOUTH EVERY DAY, Disp: 90 tablet, Rfl: 3  •  latanoprost (XALATAN) 0.005 % ophthalmic solution, Administer 1 drop to both eyes every night at bedtime., Disp: , Rfl:   •  levocetirizine (XYZAL) 5 MG tablet, Take 1 tablet by mouth Daily., Disp: , Rfl:   •  meclizine (ANTIVERT) 25 MG tablet, TAKE 1 TABLET BY MOUTH 3 TIMES A DAY AS NEEDED FOR DIZZINESS., Disp: 90 tablet, Rfl: 0  •  valACYclovir (VALTREX) 500 MG tablet, TAKE 1 TABLET BY MOUTH EVERY DAY, Disp: 90 tablet, Rfl: 1  •  amoxicillin-clavulanate (Augmentin) 875-125 MG per tablet, Take 1 tablet by mouth 2 (Two) Times a Day., Disp: 14 tablet, Rfl: 0  No current facility-administered medications for this visit.    OBJECTIVE:  /73 (BP Location: Right arm, Patient Position: Sitting, Cuff Size: Adult)   Pulse 105   Temp 97.5 °F (36.4 °C) (Temporal)   Ht 170.2 cm (67\") Comment: pt reported  Wt 83.9 kg (185 lb) Comment: pt reported  SpO2 95%   BMI 28.98 kg/m²    Physical Exam  Vitals reviewed.   Constitutional:       Appearance: She is well-developed.   HENT:      Right " Ear: Tympanic membrane, ear canal and external ear normal.      Left Ear: Tympanic membrane, ear canal and external ear normal.      Mouth/Throat:      Pharynx: Posterior oropharyngeal erythema present.   Cardiovascular:      Rate and Rhythm: Normal rate and regular rhythm.      Heart sounds: Normal heart sounds.   Pulmonary:      Effort: Pulmonary effort is normal.      Comments: Coarse in the bases  Neurological:      Mental Status: She is alert and oriented to person, place, and time.   Psychiatric:         Behavior: Behavior normal.         Assessment/Plan    Diagnoses and all orders for this visit:    1. Acute non-recurrent maxillary sinusitis (Primary)  -     amoxicillin-clavulanate (Augmentin) 875-125 MG per tablet; Take 1 tablet by mouth 2 (Two) Times a Day.  Dispense: 14 tablet; Refill: 0    2. Acute cough  -     POCT SARS-CoV-2 Antigen CAMILLE + Flu  -     triamcinolone acetonide (KENALOG-40) injection 40 mg        An After Visit Summary was printed and given to the patient at discharge.  Return if symptoms worsen or fail to improve, for Next scheduled follow up.       Eden ROOT 5/12/2023    Electronically signed.

## 2023-05-14 DIAGNOSIS — R09.82 POSTNASAL DRIP: ICD-10-CM

## 2023-05-15 RX ORDER — AZELASTINE HYDROCHLORIDE 137 UG/1
SPRAY, METERED NASAL
Qty: 30 ML | Refills: 1 | Status: SHIPPED | OUTPATIENT
Start: 2023-05-15

## 2023-05-15 NOTE — TELEPHONE ENCOUNTER
Rx Refill Note  Requested Prescriptions     Pending Prescriptions Disp Refills   • Azelastine HCl 137 MCG/SPRAY solution [Pharmacy Med Name: AZELASTINE 0.1% (137 MCG) SPRY]  1     Sig: USE 2 SPRAY INTO EACH NOSTIL 2 TIMES DAILY      Last office visit with prescribing clinician: 5/12/2023   Last telemedicine visit with prescribing clinician: 5/12/2023   Next office visit with prescribing clinician: 7/21/2023   Kim Baker MA  05/15/23, 07:36 CDT

## 2023-05-16 ENCOUNTER — OFFICE VISIT (OUTPATIENT)
Dept: OBSTETRICS AND GYNECOLOGY | Facility: CLINIC | Age: 68
End: 2023-05-16
Payer: COMMERCIAL

## 2023-05-16 VITALS
DIASTOLIC BLOOD PRESSURE: 94 MMHG | HEIGHT: 67 IN | BODY MASS INDEX: 29.03 KG/M2 | SYSTOLIC BLOOD PRESSURE: 144 MMHG | WEIGHT: 185 LBS

## 2023-05-16 DIAGNOSIS — R87.810 CERVICAL HIGH RISK HPV (HUMAN PAPILLOMAVIRUS) TEST POSITIVE: Primary | ICD-10-CM

## 2023-05-16 NOTE — PROGRESS NOTES
"Kisha Mosher is a 68 y.o. female  here today for colposcopy.  Her most recent Pap smear was normal but HPV positive (2 years in a row).      /94 (BP Location: Left arm, Patient Position: Sitting, Cuff Size: Adult)   Ht 170.2 cm (67\")   Wt 83.9 kg (185 lb)   LMP  (LMP Unknown)   Breastfeeding No   BMI 28.98 kg/m²      Colposcopy was performed in the office today.  She was placed in the lithotomy position on the exam table.  A speculum was inserted and the cervix well visualized.  The cervix was cleansed with acetic acid swabs.  Inspection with the colposcope showed the transformation zone to be absent.  There were no acetowhite lesions noted.  Colposcopy was unsatisfactory. No biopsy was performed.  An endocervical curettage was not performed.  She tolerated the procedure well.    Grade 2 cystocele present    Colposcopic impression: Normal atrophic cervix    I recommend Pap smear in 1 year.  We have discussed post colposcopy instructions.   "

## 2023-06-13 DIAGNOSIS — R09.82 POSTNASAL DRIP: ICD-10-CM

## 2023-06-13 RX ORDER — AZELASTINE HYDROCHLORIDE 137 UG/1
SPRAY, METERED NASAL
Qty: 30 ML | Refills: 1 | Status: SHIPPED | OUTPATIENT
Start: 2023-06-13

## 2023-06-13 NOTE — TELEPHONE ENCOUNTER
Rx Refill Note  Requested Prescriptions     Pending Prescriptions Disp Refills    Azelastine HCl 137 MCG/SPRAY solution [Pharmacy Med Name: AZELASTINE 0.1% (137 MCG) SPRY]  1     Sig: USE 2 SPRAY INTO EACH NOSTIL 2 TIMES DAILY      Last office visit with prescribing clinician: 5/12/823   Last telemedicine visit with prescribing clinician: Visit date not found   Next office visit with prescribing clinician: 7/21/23   {  Kim Baker MA  06/13/23, 09:36 CDT

## 2023-07-26 DIAGNOSIS — R09.82 POSTNASAL DRIP: ICD-10-CM

## 2023-07-26 RX ORDER — AZELASTINE HYDROCHLORIDE 137 UG/1
2 SPRAY, METERED NASAL 2 TIMES DAILY
Qty: 60 ML | Refills: 1 | Status: SHIPPED | OUTPATIENT
Start: 2023-07-26

## 2023-07-26 NOTE — TELEPHONE ENCOUNTER
Rx Refill Note  Requested Prescriptions     Pending Prescriptions Disp Refills    Azelastine HCl 137 MCG/SPRAY solution 60 mL 1     Si sprays by Each Nare route 2 (Two) Times a Day.      Last office visit with prescribing clinician: 2022   Last telemedicine visit with prescribing clinician: Visit date not found   Next office visit with prescribing clinician: Visit date not found                         Would you like a call back once the refill request has been completed: [] Yes [] No    If the office needs to give you a call back, can they leave a voicemail: [] Yes [] No    Nura Chavez Rep  23, 13:35 CDT

## 2023-07-31 ENCOUNTER — OFFICE VISIT (OUTPATIENT)
Dept: FAMILY MEDICINE CLINIC | Facility: CLINIC | Age: 68
End: 2023-07-31
Payer: COMMERCIAL

## 2023-07-31 VITALS
TEMPERATURE: 96.6 F | HEIGHT: 67 IN | HEART RATE: 72 BPM | OXYGEN SATURATION: 98 % | BODY MASS INDEX: 29.19 KG/M2 | SYSTOLIC BLOOD PRESSURE: 125 MMHG | DIASTOLIC BLOOD PRESSURE: 82 MMHG | WEIGHT: 186 LBS

## 2023-07-31 DIAGNOSIS — E78.2 MIXED HYPERLIPIDEMIA: ICD-10-CM

## 2023-07-31 DIAGNOSIS — I10 ESSENTIAL HYPERTENSION: Primary | ICD-10-CM

## 2023-07-31 PROCEDURE — 99213 OFFICE O/P EST LOW 20 MIN: CPT | Performed by: NURSE PRACTITIONER

## 2023-08-01 ENCOUNTER — TELEPHONE (OUTPATIENT)
Dept: FAMILY MEDICINE CLINIC | Facility: CLINIC | Age: 68
End: 2023-08-01
Payer: COMMERCIAL

## 2023-08-01 RX ORDER — PREDNISONE 10 MG/1
TABLET ORAL
Qty: 21 EACH | Refills: 0 | Status: SHIPPED | OUTPATIENT
Start: 2023-08-01

## 2023-08-16 LAB
ALBUMIN SERPL-MCNC: 4.5 G/DL (ref 3.5–5.2)
ALBUMIN/GLOB SERPL: 2.1 G/DL
ALP SERPL-CCNC: 86 U/L (ref 39–117)
ALT SERPL-CCNC: 17 U/L (ref 1–33)
AST SERPL-CCNC: 17 U/L (ref 1–32)
BILIRUB SERPL-MCNC: 0.4 MG/DL (ref 0–1.2)
BUN SERPL-MCNC: 17 MG/DL (ref 8–23)
BUN/CREAT SERPL: 20.7 (ref 7–25)
CALCIUM SERPL-MCNC: 9.4 MG/DL (ref 8.6–10.5)
CHLORIDE SERPL-SCNC: 102 MMOL/L (ref 98–107)
CHOLEST SERPL-MCNC: 197 MG/DL (ref 0–200)
CO2 SERPL-SCNC: 28.9 MMOL/L (ref 22–29)
CREAT SERPL-MCNC: 0.82 MG/DL (ref 0.57–1)
EGFRCR SERPLBLD CKD-EPI 2021: 79 ML/MIN/1.73
GLOBULIN SER CALC-MCNC: 2.1 GM/DL
GLUCOSE SERPL-MCNC: 107 MG/DL (ref 65–99)
HDLC SERPL-MCNC: 68 MG/DL (ref 40–60)
LDLC SERPL CALC-MCNC: 118 MG/DL (ref 0–100)
POTASSIUM SERPL-SCNC: 4.4 MMOL/L (ref 3.5–5.2)
PROT SERPL-MCNC: 6.6 G/DL (ref 6–8.5)
SODIUM SERPL-SCNC: 140 MMOL/L (ref 136–145)
TRIGL SERPL-MCNC: 61 MG/DL (ref 0–150)
VLDLC SERPL CALC-MCNC: 11 MG/DL (ref 5–40)

## 2023-08-17 ENCOUNTER — OFFICE VISIT (OUTPATIENT)
Dept: FAMILY MEDICINE CLINIC | Facility: CLINIC | Age: 68
End: 2023-08-17
Payer: COMMERCIAL

## 2023-08-17 VITALS
OXYGEN SATURATION: 98 % | BODY MASS INDEX: 29.35 KG/M2 | TEMPERATURE: 97.2 F | HEIGHT: 67 IN | DIASTOLIC BLOOD PRESSURE: 83 MMHG | HEART RATE: 72 BPM | WEIGHT: 187 LBS | SYSTOLIC BLOOD PRESSURE: 133 MMHG

## 2023-08-17 DIAGNOSIS — L03.114 CELLULITIS OF LEFT FOREARM: Primary | ICD-10-CM

## 2023-08-17 PROCEDURE — 99213 OFFICE O/P EST LOW 20 MIN: CPT | Performed by: NURSE PRACTITIONER

## 2023-08-17 RX ORDER — SULFAMETHOXAZOLE AND TRIMETHOPRIM 800; 160 MG/1; MG/1
1 TABLET ORAL 2 TIMES DAILY
Qty: 14 TABLET | Refills: 0 | Status: SHIPPED | OUTPATIENT
Start: 2023-08-17

## 2023-08-17 NOTE — PROGRESS NOTES
CC: left arm lesion    History:  Kisha Mohser is a 68 y.o. female who presents today for evaluation of the above problems.      Patient noticed sore spot on left forearm, near elbow, last Saturday. The area has become increasingly tender and she notes that when she cleans it with alcohol  and the scab comes off it does drain pus.     HPI  ROS:  Review of Systems   Skin:         Lesion on left forearm      No Known Allergies  Past Medical History:   Diagnosis Date    Abnormal ECG     Abnormal Pap smear of cervix     Anxiety 3/25/22    Arthritis     Cataract 2018    Colon polyp     Depression 24    Essential hypertension 2019    Fibroid uterus     Glaucoma     Hyperlipidemia      Past Surgical History:   Procedure Laterality Date     SECTION      COLONOSCOPY  2016    [facility] [place] [address] [facility]    D & C HYSTEROSCOPY MYOSURE N/A 2020    Procedure: DILATATION AND CURETTAGE HYSTEROSCOPY WITH MYOSURE;  Surgeon: Dinh Syed MD;  Location: [facility] OR;  Service: Obstetrics/Gynecology;  Laterality: N/A;    EYE SURGERY Right     JOINT REPLACEMENT      TOTAL KNEE ARTHROPLASTY Right 2017    TUBAL ABDOMINAL LIGATION       Family History   Problem Relation Age of Onset    Breast cancer Sister     Alzheimer's disease Mother     Hypertension Mother     No Known Problems Father     No Known Problems Daughter     No Known Problems Son     No Known Problems Maternal Grandmother     No Known Problems Maternal Grandfather     No Known Problems Paternal Grandmother     No Known Problems Paternal Grandfather     Breast cancer Sister     No Known Problems Daughter     No Known Problems Son     No Known Problems Son       reports that she has never smoked. She has never used smokeless tobacco. She reports that she does not drink alcohol and does not use drugs.      Current Outpatient Medications:     alendronate (Fosamax) 70 MG tablet, Take 1 tablet by mouth Every 7 (Seven) Days., Disp: 12 tablet, Rfl:  "3    amLODIPine (NORVASC) 5 MG tablet, TAKE 1 TABLET BY MOUTH EVERY DAY, Disp: 90 tablet, Rfl: 3    atorvastatin (LIPITOR) 20 MG tablet, TAKE 1 TABLET BY MOUTH EVERY DAY, Disp: 90 tablet, Rfl: 3    Azelastine HCl 137 MCG/SPRAY solution, 2 sprays by Each Nare route 2 (Two) Times a Day., Disp: 60 mL, Rfl: 1    buPROPion XL (WELLBUTRIN XL) 150 MG 24 hr tablet, TAKE 1 TABLET BY MOUTH EVERY DAY, Disp: 90 tablet, Rfl: 3    fluticasone (FLONASE) 50 MCG/ACT nasal spray, SPRAY 1 SPRAY INTO EACH NOSTRIL TWICE A DAY (Patient taking differently: Daily As Needed.), Disp: 16 mL, Rfl: 2    indapamide (LOZOL) 1.25 MG tablet, TAKE 1 TABLET BY MOUTH EVERY DAY, Disp: 90 tablet, Rfl: 3    latanoprost (XALATAN) 0.005 % ophthalmic solution, Administer 1 drop to both eyes every night at bedtime., Disp: , Rfl:     levocetirizine (XYZAL) 5 MG tablet, Take 1 tablet by mouth Daily., Disp: , Rfl:     meclizine (ANTIVERT) 25 MG tablet, TAKE 1 TABLET BY MOUTH 3 TIMES A DAY AS NEEDED FOR DIZZINESS., Disp: 90 tablet, Rfl: 0    valACYclovir (VALTREX) 500 MG tablet, TAKE 1 TABLET BY MOUTH EVERY DAY, Disp: 90 tablet, Rfl: 1    sulfamethoxazole-trimethoprim (Bactrim DS) 800-160 MG per tablet, Take 1 tablet by mouth 2 (Two) Times a Day., Disp: 14 tablet, Rfl: 0    OBJECTIVE:  /83 (BP Location: Right arm, Patient Position: Sitting, Cuff Size: Adult)   Pulse 72   Temp 97.2 øF (36.2 øC) (Temporal)   Ht 170.2 cm (67\")   Wt 84.8 kg (187 lb)   LMP  (LMP Unknown)   SpO2 98%   BMI 29.29 kg/mý    Physical Exam  Vitals reviewed.   Constitutional:       Appearance: She is well-developed.   Cardiovascular:      Rate and Rhythm: Normal rate.   Pulmonary:      Effort: Pulmonary effort is normal.   Skin:     Comments: Pimple type lesion on left forearm, near elbow. Surrounding erythema in approximately a 3 inch [place].    Neurological:      Mental Status: She is alert and oriented to person, place, and time.   Psychiatric:         Behavior: Behavior " normal.       Assessment/Plan    Diagnoses and all orders for this visit:    1. Cellulitis of left forearm (Primary)  -     sulfamethoxazole-trimethoprim (Bactrim DS) 800-160 MG per tablet; Take 1 tablet by mouth 2 (Two) Times a Day.  Dispense: 14 tablet; Refill: 0    2. Overweight    BMI is >= 25 and <30. (Overweight) The following options were offered after discussion;: weight loss educational material (shared in after visit summary)       Possible insect bite or MRSA lesion. Will treat with Bactrim to cover.     An After Visit Summary was printed and given to the patient at discharge.  Return if symptoms worsen or fail to improve, for Next scheduled follow up.       IVETT Francis     Electronically signed.

## 2023-08-22 PROBLEM — E66.3 OVERWEIGHT: Status: ACTIVE | Noted: 2023-08-22

## 2023-08-28 RX ORDER — AMLODIPINE BESYLATE 2.5 MG/1
TABLET ORAL
Qty: 90 TABLET | Refills: 3 | OUTPATIENT
Start: 2023-08-28

## 2023-10-24 RX ORDER — VALACYCLOVIR HYDROCHLORIDE 500 MG/1
TABLET, FILM COATED ORAL
Qty: 90 TABLET | Refills: 1 | Status: SHIPPED | OUTPATIENT
Start: 2023-10-24

## 2023-10-24 NOTE — TELEPHONE ENCOUNTER
Rx Refill Note  Requested Prescriptions     Pending Prescriptions Disp Refills    valACYclovir (VALTREX) 500 MG tablet [Pharmacy Med Name: VALACYCLOVIR  MG TABLET] 90 tablet 1     Sig: TAKE 1 TABLET BY MOUTH EVERY DAY                Sherin Martínez MA  10/24/23, 08:02 CDT

## 2023-11-29 DIAGNOSIS — R09.82 POSTNASAL DRIP: ICD-10-CM

## 2023-11-29 RX ORDER — AZELASTINE HYDROCHLORIDE 137 UG/1
SPRAY, METERED NASAL
Qty: 30 ML | Refills: 3 | Status: SHIPPED | OUTPATIENT
Start: 2023-11-29

## 2023-11-29 NOTE — TELEPHONE ENCOUNTER
Rx Refill Note  Requested Prescriptions     Pending Prescriptions Disp Refills    Azelastine HCl 137 MCG/SPRAY solution [Pharmacy Med Name: AZELASTINE 0.1% (137 MCG) SPRY]  3     Sig: SPRAY 2 SPRAYS INTO EACH NOSTRIL TWICE A DAY      Last office visit with prescribing clinician: 8/17/2023   Last telemedicine visit with prescribing clinician: Visit date not found   Next office visit with prescribing clinician: 1/22/2024       Kim Baker MA  11/29/23, 07:31 CST

## 2024-01-22 ENCOUNTER — OFFICE VISIT (OUTPATIENT)
Dept: FAMILY MEDICINE CLINIC | Facility: CLINIC | Age: 69
End: 2024-01-22
Payer: COMMERCIAL

## 2024-01-22 VITALS
WEIGHT: 189.4 LBS | SYSTOLIC BLOOD PRESSURE: 131 MMHG | DIASTOLIC BLOOD PRESSURE: 79 MMHG | HEART RATE: 82 BPM | HEIGHT: 67 IN | TEMPERATURE: 97.1 F | OXYGEN SATURATION: 98 % | BODY MASS INDEX: 29.73 KG/M2

## 2024-01-22 DIAGNOSIS — Z12.4 CERVICAL CANCER SCREENING: ICD-10-CM

## 2024-01-22 DIAGNOSIS — B97.7 HPV (HUMAN PAPILLOMA VIRUS) INFECTION: ICD-10-CM

## 2024-01-22 DIAGNOSIS — Z00.00 ANNUAL PHYSICAL EXAM: Primary | ICD-10-CM

## 2024-01-22 DIAGNOSIS — Z12.31 ENCOUNTER FOR SCREENING MAMMOGRAM FOR MALIGNANT NEOPLASM OF BREAST: ICD-10-CM

## 2024-01-22 DIAGNOSIS — R53.83 FATIGUE, UNSPECIFIED TYPE: ICD-10-CM

## 2024-01-22 DIAGNOSIS — F41.9 ANXIETY: ICD-10-CM

## 2024-01-22 DIAGNOSIS — E78.2 MIXED HYPERLIPIDEMIA: ICD-10-CM

## 2024-01-22 DIAGNOSIS — I10 ESSENTIAL HYPERTENSION: ICD-10-CM

## 2024-01-22 DIAGNOSIS — Z23 ENCOUNTER FOR IMMUNIZATION: ICD-10-CM

## 2024-01-22 DIAGNOSIS — E66.3 OVERWEIGHT: ICD-10-CM

## 2024-01-22 LAB
BILIRUB BLD-MCNC: NEGATIVE MG/DL
CLARITY, POC: CLEAR
COLOR UR: YELLOW
GLUCOSE UR STRIP-MCNC: NEGATIVE MG/DL
KETONES UR QL: NEGATIVE
LEUKOCYTE EST, POC: ABNORMAL
NITRITE UR-MCNC: NEGATIVE MG/ML
PH UR: 7 [PH] (ref 5–8)
PROT UR STRIP-MCNC: NEGATIVE MG/DL
RBC # UR STRIP: ABNORMAL /UL
SP GR UR: 1.02 (ref 1–1.03)
UROBILINOGEN UR QL: ABNORMAL

## 2024-01-22 PROCEDURE — 99397 PER PM REEVAL EST PAT 65+ YR: CPT | Performed by: NURSE PRACTITIONER

## 2024-01-22 PROCEDURE — 81003 URINALYSIS AUTO W/O SCOPE: CPT | Performed by: NURSE PRACTITIONER

## 2024-01-22 PROCEDURE — 90471 IMMUNIZATION ADMIN: CPT | Performed by: NURSE PRACTITIONER

## 2024-01-22 PROCEDURE — 90677 PCV20 VACCINE IM: CPT | Performed by: NURSE PRACTITIONER

## 2024-01-22 RX ORDER — ROSUVASTATIN CALCIUM 20 MG/1
20 TABLET, COATED ORAL DAILY
Qty: 90 TABLET | Refills: 2 | Status: SHIPPED | OUTPATIENT
Start: 2024-01-22

## 2024-01-22 RX ORDER — BUPROPION HYDROCHLORIDE 75 MG/1
75 TABLET ORAL DAILY
Qty: 30 TABLET | Refills: 5 | Status: SHIPPED | OUTPATIENT
Start: 2024-01-22

## 2024-01-22 NOTE — PROGRESS NOTES
CC: annual physical    History:  Kisha Mosher is a 68 y.o. female who presents today for evaluation of the above problems.      Patient reports that she is doing well. Has been having trouble with arthralgias in her knees and feet. 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 Has also been having muscle spasms, most bothersome on the left side of her neck, up into scalp. Feels that emotionally she is doing very well and she would like to wean down on her wellbutrin dose. BP is appropriate. She is due for a PAP today due to positive HPV on last year's specimen. She is also due for mammogram. She had a colonoscopy in  with a 2 years recall, so she will be due for us to get this set up at her next visit.       HPI  ROS:  Review of Systems   Respiratory:  Negative for shortness of breath.    Cardiovascular:  Negative for chest pain.   Gastrointestinal:  Negative for constipation and diarrhea.   Musculoskeletal:  Positive for arthralgias.   Neurological:  Negative for light-headedness and headaches.       No Known Allergies  Past Medical History:   Diagnosis Date    Abnormal ECG     Abnormal Pap smear of cervix     Anxiety 3/25/22    Arthritis     Cataract 2018    Colon polyp     Depression 24    Essential hypertension 2019    Fibroid uterus     Glaucoma     Hyperlipidemia      Past Surgical History:   Procedure Laterality Date     SECTION      COLONOSCOPY  2016    [facility] [place] [address] [facility]    D & C HYSTEROSCOPY MYOSURE N/A 2020    Procedure: DILATATION AND CURETTAGE HYSTEROSCOPY WITH MYOSURE;  Surgeon: Dinh Syed MD;  Location: [facility] OR;  Service: Obstetrics/Gynecology;  Laterality: N/A;    EYE SURGERY Right     JOINT REPLACEMENT      TOTAL KNEE ARTHROPLASTY Right 2017    TUBAL ABDOMINAL LIGATION       Family History   Problem Relation Age of Onset    Breast cancer Sister     Alzheimer's disease Mother     Hypertension Mother     No Known  Problems Father     No Known Problems Daughter     No Known Problems Son     No Known Problems Maternal Grandmother     No Known Problems Maternal Grandfather     No Known Problems Paternal Grandmother     No Known Problems Paternal Grandfather     Breast cancer Sister     No Known Problems Daughter     No Known Problems Son     No Known Problems Son       reports that she has never smoked. She has never been exposed to tobacco smoke. She has never used smokeless tobacco. She reports that she does not drink alcohol and does not use drugs.      Current Outpatient Medications:     alendronate (Fosamax) 70 MG tablet, Take 1 tablet by mouth Every 7 (Seven) Days., Disp: 12 tablet, Rfl: 3    amLODIPine (NORVASC) 5 MG tablet, TAKE 1 TABLET BY MOUTH EVERY DAY, Disp: 90 tablet, Rfl: 3    Azelastine HCl 137 MCG/SPRAY solution, SPRAY 2 SPRAYS INTO EACH NOSTRIL TWICE A DAY, Disp: 30 mL, Rfl: 3    fluticasone (FLONASE) 50 MCG/ACT nasal spray, SPRAY 1 SPRAY INTO EACH NOSTRIL TWICE A DAY (Patient taking differently: Daily As Needed.), Disp: 16 mL, Rfl: 2    indapamide (LOZOL) 1.25 MG tablet, TAKE 1 TABLET BY MOUTH EVERY DAY, Disp: 90 tablet, Rfl: 3    latanoprost (XALATAN) 0.005 % ophthalmic solution, Administer 1 drop to both eyes every night at bedtime., Disp: , Rfl:     levocetirizine (XYZAL) 5 MG tablet, Take 1 tablet by mouth Daily., Disp: , Rfl:     valACYclovir (VALTREX) 500 MG tablet, TAKE 1 TABLET BY MOUTH EVERY DAY, Disp: 90 tablet, Rfl: 1    buPROPion (WELLBUTRIN) 75 MG tablet, Take 1 tablet by mouth Daily., Disp: 30 tablet, Rfl: 5    meclizine (ANTIVERT) 25 MG tablet, TAKE 1 TABLET BY MOUTH 3 TIMES A DAY AS NEEDED FOR DIZZINESS. (Patient not taking: Reported on 1/22/2024), Disp: 90 tablet, Rfl: 0    rosuvastatin (Crestor) 20 MG tablet, Take 1 tablet by mouth Daily., Disp: 90 tablet, Rfl: 2    OBJECTIVE:  /79 (BP Location: Left arm, Patient Position: Sitting, Cuff Size: Adult)   Pulse 82   Temp 97.1 °F (36.2 °C)  "(Temporal)   Ht 170.2 cm (67\")   Wt 85.9 kg (189 lb 6.4 oz)   LMP  (LMP Unknown)   SpO2 98%   BMI 29.66 kg/m²    Physical Exam  Vitals reviewed.   Constitutional:       Appearance: She is well-developed.   HENT:      Right Ear: Tympanic membrane, ear canal and external ear normal.      Left Ear: Tympanic membrane, ear canal and external ear normal.      Mouth/Throat:      Mouth: Mucous membranes are moist.      Pharynx: Oropharynx is clear.   Neck:      Vascular: No carotid bruit.   Cardiovascular:      Rate and Rhythm: Normal rate and regular rhythm.      Heart sounds: Normal heart sounds.   Pulmonary:      Effort: Pulmonary effort is normal.      Breath sounds: Normal breath sounds.   Chest:   Breasts:     Right: Normal.      Left: Normal.   Abdominal:      General: Abdomen is flat. Bowel sounds are normal.      Palpations: Abdomen is soft.      Hernia: There is no hernia in the left inguinal area or right inguinal area.   Genitourinary:     Exam position: Lithotomy position.      Labia:         Right: No rash, tenderness, lesion or injury.         Left: No rash, tenderness, lesion or injury.       Cervix: Normal.      Adnexa: Right adnexa normal and left adnexa normal.      Comments: Grade 1 cystocele noted on exam.   Lymphadenopathy:      Lower Body: No right inguinal adenopathy. No left inguinal adenopathy.   Neurological:      Mental Status: She is alert and oriented to person, place, and time.   Psychiatric:         Behavior: Behavior normal.         Assessment/Plan    Diagnoses and all orders for this visit:    1. Annual physical exam (Primary)  -     CBC & Differential  -     TSH  -     T4, free  -     Comprehensive Metabolic Panel  -     Lipid Panel  -     POC Urinalysis Dipstick, Multipro  -     Pneumococcal Conjugate Vaccine 20-Valent (PCV20)  -     Mammo Screening Digital Tomosynthesis Bilateral With CAD; Future    2. Essential hypertension  -     T4, free    3. Mixed hyperlipidemia  -     Lipid " Panel  -     rosuvastatin (Crestor) 20 MG tablet; Take 1 tablet by mouth Daily.  Dispense: 90 tablet; Refill: 2    4. Overweight  -     Comprehensive Metabolic Panel    5. Encounter for immunization  -     Pneumococcal Conjugate Vaccine 20-Valent (PCV20)    6. Encounter for screening mammogram for malignant neoplasm of breast  -     Mammo Screening Digital Tomosynthesis Bilateral With CAD; Future    7. Fatigue, unspecified type  -     CBC & Differential  -     TSH  -     T4, free  -     Comprehensive Metabolic Panel  -     POC Urinalysis Dipstick, Multipro    8. Anxiety  -     buPROPion (WELLBUTRIN) 75 MG tablet; Take 1 tablet by mouth Daily.  Dispense: 30 tablet; Refill: 5    9. Cervical cancer screening  -     IGP, Rfx Aptima HPV ASCU    10. HPV (human papilloma virus) infection  -     IGP, Rfx Aptima HPV ASCU        HEALTH MAINTENANCE  Pap today. Pneumo vax today. Mammogram ordered. Will be due for colonoscopy to be scheduled at next visit. Weight info included in AVS. Would like COVID booster, but only in Moderna brand, which we do not have. She is advised to check with local pharmacies for this.     An After Visit Summary was printed and given to the patient at discharge.  Return in about 6 months (around 7/22/2024) for Next scheduled follow up.       IVETT Francis 1/22/24    Electronically signed.

## 2024-01-23 LAB
ALBUMIN SERPL-MCNC: 4.9 G/DL (ref 3.5–5.2)
ALBUMIN/GLOB SERPL: 2.5 G/DL
ALP SERPL-CCNC: 101 U/L (ref 39–117)
ALT SERPL-CCNC: 26 U/L (ref 1–33)
AST SERPL-CCNC: 22 U/L (ref 1–32)
BASOPHILS # BLD AUTO: 0.03 10*3/MM3 (ref 0–0.2)
BASOPHILS NFR BLD AUTO: 0.7 % (ref 0–1.5)
BILIRUB SERPL-MCNC: 0.5 MG/DL (ref 0–1.2)
BUN SERPL-MCNC: 17 MG/DL (ref 8–23)
BUN/CREAT SERPL: 21.3 (ref 7–25)
CALCIUM SERPL-MCNC: 9.6 MG/DL (ref 8.6–10.5)
CHLORIDE SERPL-SCNC: 101 MMOL/L (ref 98–107)
CHOLEST SERPL-MCNC: 171 MG/DL (ref 0–200)
CO2 SERPL-SCNC: 29.9 MMOL/L (ref 22–29)
CREAT SERPL-MCNC: 0.8 MG/DL (ref 0.57–1)
EGFRCR SERPLBLD CKD-EPI 2021: 81.4 ML/MIN/1.73
EOSINOPHIL # BLD AUTO: 0.09 10*3/MM3 (ref 0–0.4)
EOSINOPHIL NFR BLD AUTO: 2 % (ref 0.3–6.2)
ERYTHROCYTE [DISTWIDTH] IN BLOOD BY AUTOMATED COUNT: 12.8 % (ref 12.3–15.4)
GLOBULIN SER CALC-MCNC: 2 GM/DL
GLUCOSE SERPL-MCNC: 98 MG/DL (ref 65–99)
HCT VFR BLD AUTO: 45.5 % (ref 34–46.6)
HDLC SERPL-MCNC: 74 MG/DL (ref 40–60)
HGB BLD-MCNC: 15.2 G/DL (ref 12–15.9)
IMM GRANULOCYTES # BLD AUTO: 0.02 10*3/MM3 (ref 0–0.05)
IMM GRANULOCYTES NFR BLD AUTO: 0.4 % (ref 0–0.5)
LDLC SERPL CALC-MCNC: 86 MG/DL (ref 0–100)
LYMPHOCYTES # BLD AUTO: 1.54 10*3/MM3 (ref 0.7–3.1)
LYMPHOCYTES NFR BLD AUTO: 33.8 % (ref 19.6–45.3)
MCH RBC QN AUTO: 30.5 PG (ref 26.6–33)
MCHC RBC AUTO-ENTMCNC: 33.4 G/DL (ref 31.5–35.7)
MCV RBC AUTO: 91.2 FL (ref 79–97)
MONOCYTES # BLD AUTO: 0.39 10*3/MM3 (ref 0.1–0.9)
MONOCYTES NFR BLD AUTO: 8.6 % (ref 5–12)
NEUTROPHILS # BLD AUTO: 2.49 10*3/MM3 (ref 1.7–7)
NEUTROPHILS NFR BLD AUTO: 54.5 % (ref 42.7–76)
NRBC BLD AUTO-RTO: 0 /100 WBC (ref 0–0.2)
PLATELET # BLD AUTO: 198 10*3/MM3 (ref 140–450)
POTASSIUM SERPL-SCNC: 3.5 MMOL/L (ref 3.5–5.2)
PROT SERPL-MCNC: 6.9 G/DL (ref 6–8.5)
RBC # BLD AUTO: 4.99 10*6/MM3 (ref 3.77–5.28)
SODIUM SERPL-SCNC: 140 MMOL/L (ref 136–145)
T4 FREE SERPL-MCNC: 1.5 NG/DL (ref 0.93–1.7)
TRIGL SERPL-MCNC: 55 MG/DL (ref 0–150)
TSH SERPL DL<=0.005 MIU/L-ACNC: 2.51 UIU/ML (ref 0.27–4.2)
VLDLC SERPL CALC-MCNC: 11 MG/DL (ref 5–40)
WBC # BLD AUTO: 4.56 10*3/MM3 (ref 3.4–10.8)

## 2024-01-24 LAB
CONV .: NORMAL
CYTOLOGIST CVX/VAG CYTO: NORMAL
CYTOLOGY CVX/VAG DOC CYTO: NORMAL
CYTOLOGY CVX/VAG DOC THIN PREP: NORMAL
DX ICD CODE: NORMAL
HIV 1 & 2 AB SER-IMP: NORMAL
OTHER STN SPEC: NORMAL
STAT OF ADQ CVX/VAG CYTO-IMP: NORMAL

## 2024-01-25 LAB
Lab: NORMAL
Lab: NORMAL

## 2024-01-30 DIAGNOSIS — B97.7 HPV (HUMAN PAPILLOMA VIRUS) INFECTION: Primary | ICD-10-CM

## 2024-01-30 LAB
HPV I/H RISK 4 DNA CVX QL PROBE+SIG AMP: POSITIVE
HPV16 DNA CVX QL PROBE+SIG AMP: NEGATIVE
HPV18+45 E6+E7 MRNA CVX QL NAA+PROBE: NEGATIVE
WRITTEN AUTHORIZATION: NORMAL

## 2024-02-02 ENCOUNTER — TELEPHONE (OUTPATIENT)
Dept: OBSTETRICS AND GYNECOLOGY | Age: 69
End: 2024-02-02
Payer: COMMERCIAL

## 2024-02-02 NOTE — TELEPHONE ENCOUNTER
Called on 02/02/24 at 8:53 am to get patient scheduled. Phone went straight to voicemail and the mailbox is full. Unable to leave a message

## 2024-02-02 NOTE — TELEPHONE ENCOUNTER
Pt called with questions about colposcopy. Pt voices that she had an normal pap last year and a colpo and was told to follow up in a year. Pt had new pap this year in January with PCP and recommendation was to have another colpo. Pt had many questions and doesn't want to continue to do colpos. Pt advised that the recommendation is to still follow up with additional testing. Pt voices she would call back if she wants to schedule. Questions answered. Pt also given the number to billing since she had questions.

## 2024-02-26 RX ORDER — VALACYCLOVIR HYDROCHLORIDE 500 MG/1
TABLET, FILM COATED ORAL
Qty: 90 TABLET | Refills: 1 | Status: SHIPPED | OUTPATIENT
Start: 2024-02-26

## 2024-02-26 NOTE — TELEPHONE ENCOUNTER
Rx Refill Note  Requested Prescriptions     Pending Prescriptions Disp Refills    valACYclovir (VALTREX) 500 MG tablet [Pharmacy Med Name: VALACYCLOVIR  MG TABLET] 90 tablet 1     Sig: TAKE 1 TABLET BY MOUTH EVERY DAY      Last office visit with prescribing clinician: 01/22/2024  Last telemedicine visit with prescribing clinician: Visit date not found   Next office visit with prescribing clinician: 07/22/2024  Camille Boston MA  02/26/24, 12:14 CST

## 2024-03-07 DIAGNOSIS — M85.80 OSTEOPENIA, UNSPECIFIED LOCATION: Primary | ICD-10-CM

## 2024-03-07 RX ORDER — ALENDRONATE SODIUM 70 MG/1
70 TABLET ORAL
Qty: 12 TABLET | Refills: 3 | Status: SHIPPED | OUTPATIENT
Start: 2024-03-07

## 2024-03-07 NOTE — TELEPHONE ENCOUNTER
Rx Refill Note  Requested Prescriptions     Pending Prescriptions Disp Refills    alendronate (FOSAMAX) 70 MG tablet [Pharmacy Med Name: ALENDRONATE SODIUM 70 MG TAB] 12 tablet 3     Sig: TAKE 1 TABLET BY MOUTH EVERY 7 DAYS.      Last office visit with prescribing clinician: 1/22/2024   Last telemedicine visit with prescribing clinician: Visit date not found   Next office visit with prescribing clinician: 7/22/2024   CPE done 01/22/2024    {TIP  Please add Last Relevant Lab Date if appropriate 01/22/2024             {TIP  Is Refill Pharmacy correct?: yes    Would you like a call back once the refill request has been completed: [] Yes [x] No    If the office needs to give you a call back, can they leave a voicemail: [] Yes [] No    Kim Duque MA  03/07/24, 12:40 CST

## 2024-03-18 DIAGNOSIS — F41.9 ANXIETY: ICD-10-CM

## 2024-03-18 RX ORDER — BUPROPION HYDROCHLORIDE 75 MG/1
75 TABLET ORAL DAILY
Qty: 90 TABLET | Refills: 3 | Status: SHIPPED | OUTPATIENT
Start: 2024-03-18

## 2024-03-18 NOTE — TELEPHONE ENCOUNTER
Rx Refill Note  Requested Prescriptions     Pending Prescriptions Disp Refills    buPROPion (WELLBUTRIN) 75 MG tablet 90 tablet 3     Sig: Take 1 tablet by mouth Daily.      Last office visit with prescribing clinician: 1/22/2024   Last telemedicine visit with prescribing clinician: Visit date not found   Next office visit with prescribing clinician: 7/22/2024                         Would you like a call back once the refill request has been completed: [] Yes [] No    If the office needs to give you a call back, can they leave a voicemail: [] Yes [] No    Nura Chavez Rep  03/18/24, 09:14 CDT

## 2024-03-25 ENCOUNTER — OFFICE VISIT (OUTPATIENT)
Dept: FAMILY MEDICINE CLINIC | Facility: CLINIC | Age: 69
End: 2024-03-25
Payer: COMMERCIAL

## 2024-03-25 VITALS
OXYGEN SATURATION: 98 % | HEART RATE: 75 BPM | HEIGHT: 67 IN | BODY MASS INDEX: 30.32 KG/M2 | DIASTOLIC BLOOD PRESSURE: 80 MMHG | WEIGHT: 193.2 LBS | TEMPERATURE: 97.5 F | SYSTOLIC BLOOD PRESSURE: 124 MMHG

## 2024-03-25 DIAGNOSIS — M54.42 ACUTE BILATERAL LOW BACK PAIN WITH BILATERAL SCIATICA: Primary | ICD-10-CM

## 2024-03-25 DIAGNOSIS — M54.41 ACUTE BILATERAL LOW BACK PAIN WITH BILATERAL SCIATICA: Primary | ICD-10-CM

## 2024-03-25 RX ORDER — PREDNISONE 10 MG/1
TABLET ORAL
Qty: 21 TABLET | Refills: 0 | Status: SHIPPED | OUTPATIENT
Start: 2024-03-25

## 2024-03-25 RX ORDER — TRIAMCINOLONE ACETONIDE 40 MG/ML
40 INJECTION, SUSPENSION INTRA-ARTICULAR; INTRAMUSCULAR ONCE
Status: COMPLETED | OUTPATIENT
Start: 2024-03-25 | End: 2024-03-25

## 2024-03-25 RX ADMIN — TRIAMCINOLONE ACETONIDE 40 MG: 40 INJECTION, SUSPENSION INTRA-ARTICULAR; INTRAMUSCULAR at 15:09

## 2024-03-25 NOTE — PROGRESS NOTES
CC: hip pain    History:  Kisha Mosher is a 68 y.o. female who presents today for evaluation of the above problems.      Patient has been having off and on hip pain that is getting progressively worse. After a recent road trip for  she was unable to walk the day after making the long drive there and again on the way back. After any period of prolonged sitting - more than a couple hours - she has difficulty with ambulation following. She notes that she is having significant comfort while sleeping when she rolls over and also when she first gets out of bed. She has not noticed any weakness in her legs.     HPI  ROS:  Review of Systems   Musculoskeletal:  Positive for arthralgias and back pain.       No Known Allergies  Past Medical History:   Diagnosis Date    Abnormal ECG     Abnormal Pap smear of cervix     Anxiety 3/25/22    Arthritis     Cataract 2018    Colon polyp     Depression 22    Essential hypertension 2019    Fibroid uterus     Glaucoma     Hyperlipidemia      Past Surgical History:   Procedure Laterality Date     SECTION      COLONOSCOPY  2016    [facility] [place] [address] [facility]UJILLO    D & C HYSTEROSCOPY MYOSURE N/A 2020    Procedure: DILATATION AND CURETTAGE HYSTEROSCOPY WITH MYOSURE;  Surgeon: Dinh Syed MD;  Location: [facility];  Service: Obstetrics/Gynecology;  Laterality: N/A;    EYE SURGERY Right     JOINT REPLACEMENT      TOTAL KNEE ARTHROPLASTY Right 2017    TUBAL ABDOMINAL LIGATION       Family History   Problem Relation Age of Onset    Breast cancer Sister     Alzheimer's disease Mother     Hypertension Mother     No Known Problems Father     No Known Problems Daughter     No Known Problems Son     No Known Problems Maternal Grandmother     No Known Problems Maternal Grandfather     No Known Problems Paternal Grandmother     No Known Problems Paternal Grandfather     Breast cancer Sister     No Known Problems Daughter     No Known Problems Son     No Known  "Problems Son       reports that she has never smoked. She has never been exposed to tobacco smoke. She has never used smokeless tobacco. She reports that she does not drink alcohol and does not use drugs.      Current Outpatient Medications:     alendronate (FOSAMAX) 70 MG tablet, TAKE 1 TABLET BY MOUTH EVERY 7 DAYS., Disp: 12 tablet, Rfl: 3    amLODIPine (NORVASC) 5 MG tablet, TAKE 1 TABLET BY MOUTH EVERY DAY, Disp: 90 tablet, Rfl: 3    Azelastine HCl 137 MCG/SPRAY solution, SPRAY 2 SPRAYS INTO EACH NOSTRIL TWICE A DAY, Disp: 30 mL, Rfl: 3    buPROPion (WELLBUTRIN) 75 MG tablet, Take 1 tablet by mouth Daily., Disp: 90 tablet, Rfl: 3    fluticasone (FLONASE) 50 MCG/ACT nasal spray, SPRAY 1 SPRAY INTO EACH NOSTRIL TWICE A DAY (Patient taking differently: Daily As Needed.), Disp: 16 mL, Rfl: 2    indapamide (LOZOL) 1.25 MG tablet, TAKE 1 TABLET BY MOUTH EVERY DAY, Disp: 90 tablet, Rfl: 3    latanoprost (XALATAN) 0.005 % ophthalmic solution, Administer 1 drop to both eyes every night at bedtime., Disp: , Rfl:     levocetirizine (XYZAL) 5 MG tablet, Take 1 tablet by mouth Daily., Disp: , Rfl:     meclizine (ANTIVERT) 25 MG tablet, TAKE 1 TABLET BY MOUTH 3 TIMES A DAY AS NEEDED FOR DIZZINESS., Disp: 90 tablet, Rfl: 0    rosuvastatin (Crestor) 20 MG tablet, Take 1 tablet by mouth Daily., Disp: 90 tablet, Rfl: 2    valACYclovir (VALTREX) 500 MG tablet, TAKE 1 TABLET BY MOUTH EVERY DAY, Disp: 90 tablet, Rfl: 1    predniSONE (DELTASONE) 10 MG (21) dose pack, Use as directed on package, Disp: 21 tablet, Rfl: 0  No current facility-administered medications for this visit.    OBJECTIVE:  /80 (BP Location: Left arm, Patient Position: Sitting, Cuff Size: Adult)   Pulse 75   Temp 97.5 °F (36.4 °C) (Temporal)   Ht 170.2 cm (67\")   Wt 87.6 kg (193 lb 3.2 oz)   LMP  (LMP Unknown)   SpO2 98%   BMI 30.26 kg/m²    Physical Exam  Vitals reviewed.   Constitutional:       Appearance: She is well-developed.   Cardiovascular:    "   Rate and Rhythm: Normal rate.   Pulmonary:      Effort: Pulmonary effort is normal.   Musculoskeletal:      Comments: No bursal tenderness of hips noted.    Neurological:      Mental Status: She is alert and oriented to person, place, and time.   Psychiatric:         Behavior: Behavior normal.         Assessment/Plan    Diagnoses and all orders for this visit:    1. Acute bilateral low back pain with bilateral sciatica (Primary)  -     triamcinolone acetonide (KENALOG-40) injection 40 mg  -     predniSONE (DELTASONE) 10 MG (21) dose pack; Use as directed on package  Dispense: 21 tablet; Refill: 0        An After Visit Summary was printed and given to the patient at discharge.  Return in about 1 week (around 4/1/2024) for Recheck.       IVETT Francis 3/25/24    Electronically signed.

## 2024-03-25 NOTE — LETTER
March 25, 2024     Patient: Kisha Mosher   YOB: 1955   Date of Visit: 3/25/2024       To Whom It May Concern:    It is my medical opinion that Kisha Mosher may return to work on 4/1/2024 .           Sincerely,        IVETT Merrill    CC: No Recipients

## 2024-03-26 ENCOUNTER — TELEPHONE (OUTPATIENT)
Dept: FAMILY MEDICINE CLINIC | Facility: CLINIC | Age: 69
End: 2024-03-26
Payer: COMMERCIAL

## 2024-03-26 DIAGNOSIS — M25.552 BILATERAL HIP PAIN: Primary | ICD-10-CM

## 2024-03-26 DIAGNOSIS — M25.551 BILATERAL HIP PAIN: Primary | ICD-10-CM

## 2024-03-26 RX ORDER — TRAMADOL HYDROCHLORIDE 50 MG/1
50 TABLET ORAL EVERY 6 HOURS PRN
Qty: 30 TABLET | Refills: 0 | Status: SHIPPED | OUTPATIENT
Start: 2024-03-26

## 2024-03-26 NOTE — TELEPHONE ENCOUNTER
Let's get xrays of hip and back. Steroids just haven't had time to fully work - she still has several days of pills to take in addition to the shot she got yesterday. Pain medication sent to pharmacy also.

## 2024-03-26 NOTE — TELEPHONE ENCOUNTER
Caller: Kisha Mosher    Relationship: Self    Best call back number: [phone]     What is the best time to reach you: ANY    Who are you requesting to speak with (clinical staff, provider,  specific staff member): CLINICAL    Do you know the name of the person who called: SELF    What was the call regarding: GOT A SHOT YESTERDAY IN OFFICE AND HASN'T FELT ANY TYPE OF RELIEF. WOULD LIKE TO DISCUSS WHY NOT.     Is it okay if the provider responds through MyChart: NO CALL BACK PREFERRED

## 2024-03-28 ENCOUNTER — TELEPHONE (OUTPATIENT)
Dept: FAMILY MEDICINE CLINIC | Facility: CLINIC | Age: 69
End: 2024-03-28
Payer: COMMERCIAL

## 2024-03-28 NOTE — PROGRESS NOTES
I just see arthritis in all the xrays. If still no better after finishing steroids schedule follow up.

## 2024-03-28 NOTE — TELEPHONE ENCOUNTER
Caller: Kisha Mosher    Relationship: Self    Best call back number:    [phone]   Caller requesting test results:      What test was performed: XRAY SPINE LUMBAR    When was the test performed: 3/26/2024

## 2024-04-04 ENCOUNTER — OFFICE VISIT (OUTPATIENT)
Dept: FAMILY MEDICINE CLINIC | Facility: CLINIC | Age: 69
End: 2024-04-04
Payer: COMMERCIAL

## 2024-04-04 VITALS
HEIGHT: 67 IN | DIASTOLIC BLOOD PRESSURE: 87 MMHG | HEART RATE: 73 BPM | BODY MASS INDEX: 30.13 KG/M2 | OXYGEN SATURATION: 97 % | WEIGHT: 192 LBS | TEMPERATURE: 97.8 F | SYSTOLIC BLOOD PRESSURE: 144 MMHG

## 2024-04-04 DIAGNOSIS — M70.71 BURSITIS OF OTHER BURSA OF RIGHT HIP: Primary | ICD-10-CM

## 2024-04-04 DIAGNOSIS — I10 ESSENTIAL HYPERTENSION: ICD-10-CM

## 2024-04-04 PROCEDURE — 99213 OFFICE O/P EST LOW 20 MIN: CPT | Performed by: NURSE PRACTITIONER

## 2024-04-04 RX ORDER — NAPROXEN 500 MG/1
500 TABLET ORAL 2 TIMES DAILY WITH MEALS
Qty: 60 TABLET | Refills: 0 | Status: SHIPPED | OUTPATIENT
Start: 2024-04-04

## 2024-04-04 NOTE — PROGRESS NOTES
CC: right hip pain    History:  Kisha Mosher is a 68 y.o. female who presents today for evaluation of the above problems.      Patient states that after steroid injection one week ago her pain was actually much worse. She has continued to take the prednisone tablets as ordered and her symptoms have improved slightly. States that moving in the morning from the middle to the edge of the bed causes a great deal of pain and also notes that at times it feels like her hip is going to give out on her.         HPI  ROS:  Review of Systems   Musculoskeletal:  Positive for arthralgias (right hip pain).       No Known Allergies  Past Medical History:   Diagnosis Date    Abnormal ECG     Abnormal Pap smear of cervix     Anxiety 3/25/22    Arthritis     Cataract 2018    Colon polyp     Depression 22    Essential hypertension 2019    Fibroid uterus     Glaucoma     Hyperlipidemia      Past Surgical History:   Procedure Laterality Date     SECTION      [place]OSCOPY  [place]    [facility] [place]    D & C HYSTEROSCOPY MYOSURE N/A 2020    Procedure: DILATATION AND CURETTAGE HYSTEROSCOPY WITH MYOSURE;  Surgeon: Dinh Syed MD;  Location: [facility] OR;  Service: Obstetrics/Gynecology;  Laterality: N/A;    EYE SURGERY Right     JOINT REPLACEMENT      TOTAL KNEE ARTHROPLASTY Right 2017    TUBAL ABDOMINAL LIGATION       Family History   Problem Relation Age of Onset    Breast cancer Sister     Alzheimer's disease Mother     Hypertension Mother     No Known Problems Father     No Known Problems Daughter     No Known Problems Son     No Known Problems Maternal Grandmother     No Known Problems Maternal Grandfather     No Known Problems Paternal Grandmother     No Known Problems Paternal Grandfather     Breast cancer Sister     No Known Problems Daughter     No Known Problems Son     No Known Problems Son       reports that she has never smoked. She has never been exposed to tobacco smoke. She has  "never used smokeless tobacco. She reports that she does not drink alcohol and does not use drugs.      Current Outpatient Medications:     alendronate (FOSAMAX) 70 MG tablet, TAKE 1 TABLET BY MOUTH EVERY 7 DAYS., Disp: 12 tablet, Rfl: 3    amLODIPine (NORVASC) 5 MG tablet, TAKE 1 TABLET BY MOUTH EVERY DAY, Disp: 90 tablet, Rfl: 3    Azelastine HCl 137 MCG/SPRAY solution, SPRAY 2 SPRAYS INTO EACH NOSTRIL TWICE A DAY, Disp: 30 mL, Rfl: 3    buPROPion (WELLBUTRIN) 75 MG tablet, Take 1 tablet by mouth Daily., Disp: 90 tablet, Rfl: 3    fluticasone (FLONASE) 50 MCG/ACT nasal spray, SPRAY 1 SPRAY INTO EACH NOSTRIL TWICE A DAY (Patient taking differently: Daily As Needed.), Disp: 16 mL, Rfl: 2    indapamide (LOZOL) 1.25 MG tablet, TAKE 1 TABLET BY MOUTH EVERY DAY, Disp: 90 tablet, Rfl: 3    latanoprost (XALATAN) 0.005 % ophthalmic solution, Administer 1 drop to both eyes every night at bedtime., Disp: , Rfl:     levocetirizine (XYZAL) 5 MG tablet, Take 1 tablet by mouth Daily., Disp: , Rfl:     meclizine (ANTIVERT) 25 MG tablet, TAKE 1 TABLET BY MOUTH 3 TIMES A DAY AS NEEDED FOR DIZZINESS., Disp: 90 tablet, Rfl: 0    rosuvastatin (Crestor) 20 MG tablet, Take 1 tablet by mouth Daily., Disp: 90 tablet, Rfl: 2    traMADol (ULTRAM) 50 MG tablet, Take 1 tablet by mouth Every 6 (Six) Hours As Needed for Moderate Pain., Disp: 30 tablet, Rfl: 0    valACYclovir (VALTREX) 500 MG tablet, TAKE 1 TABLET BY MOUTH EVERY DAY, Disp: 90 tablet, Rfl: 1    naproxen (Naprosyn) 500 MG tablet, Take 1 tablet by mouth 2 (Two) Times a Day With Meals., Disp: 60 tablet, Rfl: 0    OBJECTIVE:  /87 (BP Location: Left arm, Patient Position: Sitting, Cuff Size: Adult)   Pulse 73   Temp 97.8 °F (36.6 °C) (Temporal)   Ht 170.2 cm (67\")   Wt 87.1 kg (192 lb)   LMP  (LMP Unknown)   SpO2 97%   BMI 30.07 kg/m²    Physical Exam  Vitals reviewed.   Constitutional:       Appearance: She is well-developed.   Cardiovascular:      Rate and Rhythm: Normal " rate.   Pulmonary:      Effort: Pulmonary effort is normal.   Musculoskeletal:      Comments: Point tenderness over right bursa. No pain with range of motion of hip.    Neurological:      Mental Status: She is alert and oriented to person, place, and time.   Psychiatric:         Behavior: Behavior normal.         Assessment/Plan    Diagnoses and all orders for this visit:    1. Bursitis of other bursa of right hip (Primary)  -     naproxen (Naprosyn) 500 MG tablet; Take 1 tablet by mouth 2 (Two) Times a Day With Meals.  Dispense: 60 tablet; Refill: 0    2. Essential hypertension    BP minimally elevated today. Will recheck at next visit.     Advised to ice the bursa for 20 minutes twice a day.     An After Visit Summary was printed and given to the patient at discharge.  Return in about 2 weeks (around 4/18/2024) for Recheck.       IVETT Francis 4/4/24    Electronically signed.

## 2024-04-15 DIAGNOSIS — R92.8 ABNORMAL MAMMOGRAM OF LEFT BREAST: Primary | ICD-10-CM

## 2024-04-18 ENCOUNTER — OFFICE VISIT (OUTPATIENT)
Dept: FAMILY MEDICINE CLINIC | Facility: CLINIC | Age: 69
End: 2024-04-18
Payer: COMMERCIAL

## 2024-04-18 VITALS
HEIGHT: 67 IN | TEMPERATURE: 97.2 F | BODY MASS INDEX: 29.98 KG/M2 | OXYGEN SATURATION: 95 % | WEIGHT: 191 LBS | SYSTOLIC BLOOD PRESSURE: 137 MMHG | HEART RATE: 82 BPM | DIASTOLIC BLOOD PRESSURE: 85 MMHG

## 2024-04-18 DIAGNOSIS — M54.41 ACUTE RIGHT-SIDED LOW BACK PAIN WITH RIGHT-SIDED SCIATICA: Primary | ICD-10-CM

## 2024-04-18 DIAGNOSIS — M70.71 BURSITIS OF OTHER BURSA OF RIGHT HIP: ICD-10-CM

## 2024-04-18 DIAGNOSIS — E66.3 OVERWEIGHT: ICD-10-CM

## 2024-04-18 NOTE — PROGRESS NOTES
CC: 2 week follow up - back and hip pain    History:  Kisha Mosher is a 69 y.o. female who presents today for evaluation of the above problems.      Patient states that the pain in her hip is doing some better, however, she has notice having a lot of increased tightness running down her right thigh. She hasn't noticed weakness in the leg. Lumbar xray did show diffuse demineralization and disc space narrowing.     HPI  ROS:  Review of Systems   Musculoskeletal:  Positive for arthralgias and back pain.        Thigh tightness       No Known Allergies  Past Medical History:   Diagnosis Date    Abnormal ECG     Abnormal Pap smear of cervix     Anxiety 3/25/22    Arthritis     Cataract 2018    Colon polyp     Depression 22    Essential hypertension 2019    Fibroid uterus     Glaucoma     Hyperlipidemia      Past Surgical History:   Procedure Laterality Date     SECTION      COLONOSCOPY  2016    [facility] [place]UJILLO    D & C HYSTEROSCOPY MYOSURE N/A 2020    Procedure: DILATATION AND CURETTAGE HYSTEROSCOPY WITH MYOSURE;  Surgeon: Dinh Syed MD;  Location: [facility];  Service: Obstetrics/Gynecology;  Laterality: N/A;    EYE SURGERY Right     JOINT REPLACEMENT      TOTAL KNEE ARTHROPLASTY Right 2017    TUBAL ABDOMINAL LIGATION       Family History   Problem Relation Age of Onset    Breast cancer Sister     Alzheimer's disease Mother     Hypertension Mother     No Known Problems Father     No Known Problems Daughter     No Known Problems Son     No Known Problems Maternal Grandmother     No Known Problems Maternal Grandfather     No Known Problems Paternal Grandmother     No Known Problems Paternal Grandfather     Breast cancer Sister     No Known Problems Daughter     No Known Problems Son     No Known Problems Son       reports that she has never smoked. She has never been exposed to tobacco smoke. She has never used smokeless tobacco. She reports that she does not drink  "alcohol and does not use drugs.      Current Outpatient Medications:     alendronate (FOSAMAX) 70 MG tablet, TAKE 1 TABLET BY MOUTH EVERY 7 DAYS., Disp: 12 tablet, Rfl: 3    amLODIPine (NORVASC) 5 MG tablet, TAKE 1 TABLET BY MOUTH EVERY DAY, Disp: 90 tablet, Rfl: 3    Azelastine HCl 137 MCG/SPRAY solution, SPRAY 2 SPRAYS INTO EACH NOSTRIL TWICE A DAY, Disp: 30 mL, Rfl: 3    buPROPion (WELLBUTRIN) 75 MG tablet, Take 1 tablet by mouth Daily., Disp: 90 tablet, Rfl: 3    fluticasone (FLONASE) 50 MCG/ACT nasal spray, SPRAY 1 SPRAY INTO EACH NOSTRIL TWICE A DAY (Patient taking differently: Daily As Needed.), Disp: 16 mL, Rfl: 2    indapamide (LOZOL) 1.25 MG tablet, TAKE 1 TABLET BY MOUTH EVERY DAY, Disp: 90 tablet, Rfl: 3    latanoprost (XALATAN) 0.005 % ophthalmic solution, Administer 1 drop to both eyes every night at bedtime., Disp: , Rfl:     levocetirizine (XYZAL) 5 MG tablet, Take 1 tablet by mouth Daily., Disp: , Rfl:     meclizine (ANTIVERT) 25 MG tablet, TAKE 1 TABLET BY MOUTH 3 TIMES A DAY AS NEEDED FOR DIZZINESS., Disp: 90 tablet, Rfl: 0    naproxen (Naprosyn) 500 MG tablet, Take 1 tablet by mouth 2 (Two) Times a Day With Meals., Disp: 60 tablet, Rfl: 0    rosuvastatin (Crestor) 20 MG tablet, Take 1 tablet by mouth Daily., Disp: 90 tablet, Rfl: 2    traMADol (ULTRAM) 50 MG tablet, Take 1 tablet by mouth Every 6 (Six) Hours As Needed for Moderate Pain., Disp: 30 tablet, Rfl: 0    valACYclovir (VALTREX) 500 MG tablet, TAKE 1 TABLET BY MOUTH EVERY DAY, Disp: 90 tablet, Rfl: 1    OBJECTIVE:  /85 (BP Location: Left arm, Patient Position: Sitting, Cuff Size: Adult)   Pulse 82   Temp 97.2 °F (36.2 °C) (Temporal)   Ht 170.2 cm (67\")   Wt 86.6 kg (191 lb)   LMP  (LMP Unknown)   SpO2 95%   BMI 29.91 kg/m²    Physical Exam  Vitals reviewed.   Constitutional:       Appearance: She is well-developed.   Cardiovascular:      Rate and Rhythm: Normal rate.   Pulmonary:      Effort: Pulmonary effort is normal. "   Neurological:      Mental Status: She is alert and oriented to person, place, and time.   Psychiatric:         Behavior: Behavior normal.         Assessment/Plan    Diagnoses and all orders for this visit:    1. Acute right-sided low back pain with right-sided sciatica (Primary)  -     Ambulatory Referral to Physical Therapy Evaluate and treat    2. Bursitis of other bursa of right hip  -     Ambulatory Referral to Physical Therapy Evaluate and treat    3. Overweight      Risks of being overweight added to AVS.       Concern for potential radiculopathy with pain starting to radiate down into thigh. Will start a dedicated course of physical therapy. Patient to be re-evaluated in one month. If symptoms persist will need lumbar MRI.     An After Visit Summary was printed and given to the patient at discharge.  Return in about 1 month (around 5/18/2024) for Recheck.       IVETT Francis 4/18/24    Electronically signed.

## 2024-04-22 ENCOUNTER — TELEPHONE (OUTPATIENT)
Dept: FAMILY MEDICINE CLINIC | Facility: CLINIC | Age: 69
End: 2024-04-22
Payer: COMMERCIAL

## 2024-04-22 NOTE — TELEPHONE ENCOUNTER
Sharon called and said that her insurance would only pay for her mammogram if it was put in as preventative- I explained that we couldn't because she had one and that's why she needed a diagnostic. She said to just cancel that and the ultrasound. I advised several times that she should go ahead and have it done just in case it does turn out to be something- she said to cancel those appts for now and she will think about if she wants to have them done. Advised Eden as well.

## 2024-04-24 DIAGNOSIS — R92.8 ABNORMAL MAMMOGRAM: Primary | ICD-10-CM

## 2024-05-01 DIAGNOSIS — M70.71 BURSITIS OF OTHER BURSA OF RIGHT HIP: ICD-10-CM

## 2024-05-01 RX ORDER — NAPROXEN 500 MG/1
500 TABLET ORAL 2 TIMES DAILY WITH MEALS
Qty: 60 TABLET | Refills: 0 | Status: SHIPPED | OUTPATIENT
Start: 2024-05-01

## 2024-05-01 NOTE — TELEPHONE ENCOUNTER
Rx Refill Note  Requested Prescriptions     Pending Prescriptions Disp Refills    naproxen (NAPROSYN) 500 MG tablet [Pharmacy Med Name: NAPROXEN 500 MG TABLET] 60 tablet 0     Sig: TAKE 1 TABLET BY MOUTH TWICE A DAY WITH MEALS      Last office visit with prescribing clinician: 4/18/2024   Last telemedicine visit with prescribing clinician: Visit date not found   Next office visit with prescribing clinician: 5/20/2024       {TIP  Please add Last Relevant Lab 1/22/24    Kim Baker MA  05/01/24, 07:31 CDT

## 2024-05-17 ENCOUNTER — TELEPHONE (OUTPATIENT)
Dept: FAMILY MEDICINE CLINIC | Facility: CLINIC | Age: 69
End: 2024-05-17
Payer: COMMERCIAL

## 2024-05-17 NOTE — TELEPHONE ENCOUNTER
Caller: Kisha Mosher    Relationship: Self    Best call back number: [phone]     What is the best time to reach you: ANYTIME    Who are you requesting to speak with (clinical staff, provider,  specific staff member): JOSE ENGEL    What was the call regarding: PATIENT STATED SHE IS NEEDING TO SPEAK TO SOMEONE IN REGARDS TO HER BEING ON LEAVE. PATIENT IS WANTING TO KNOW IF JOSE RECEIVED ANY PAPERWORK IN REGARDS TO THE LEAVE.    ALSO, PATIENT IS WANTING TO KNOW IF RESULTS FROM MAMMOGRAM HAVE CAME IN.    PLEASE CALL TO DISCUSS

## 2024-05-20 ENCOUNTER — OFFICE VISIT (OUTPATIENT)
Dept: FAMILY MEDICINE CLINIC | Facility: CLINIC | Age: 69
End: 2024-05-20
Payer: COMMERCIAL

## 2024-05-20 VITALS
HEART RATE: 75 BPM | DIASTOLIC BLOOD PRESSURE: 83 MMHG | BODY MASS INDEX: 30.7 KG/M2 | HEIGHT: 67 IN | WEIGHT: 195.6 LBS | OXYGEN SATURATION: 95 % | SYSTOLIC BLOOD PRESSURE: 134 MMHG | TEMPERATURE: 97.5 F

## 2024-05-20 DIAGNOSIS — G89.29 CHRONIC RIGHT-SIDED LOW BACK PAIN WITH RIGHT-SIDED SCIATICA: Primary | ICD-10-CM

## 2024-05-20 DIAGNOSIS — M54.41 CHRONIC RIGHT-SIDED LOW BACK PAIN WITH RIGHT-SIDED SCIATICA: Primary | ICD-10-CM

## 2024-05-20 PROCEDURE — 99213 OFFICE O/P EST LOW 20 MIN: CPT | Performed by: NURSE PRACTITIONER

## 2024-05-20 NOTE — PROGRESS NOTES
CC: low back pain     History:  Kisha Mosher is a 69 y.o. female who presents today for evaluation of the above problems.      Patient states that she feels like her back pain has not improved at all. She states that her foot has started catching when she is walking. She does complain of right sided sciatica down to the calf. Does not feel that PT is helping.     HPI  ROS:  Review of Systems   Musculoskeletal:  Positive for back pain.       No Known Allergies  Past Medical History:   Diagnosis Date    Abnormal ECG     Abnormal Pap smear of cervix     Anxiety 3/25/22    Arthritis     Cataract 2018    Colon polyp     Depression 22    Essential hypertension 2019    Fibroid uterus     Glaucoma     Hyperlipidemia      Past Surgical History:   Procedure Laterality Date     SECTION      COLONOSCOPY  2016    [facility] [place]    D & C HYSTEROSCOPY MYOSURE N/A 2020    Procedure: DILATATION AND CURETTAGE HYSTEROSCOPY WITH MYOSURE;  Surgeon: Dinh Syed MD;  Location: [facility];  Service: Obstetrics/Gynecology;  Laterality: N/A;    EYE SURGERY Right     JOINT REPLACEMENT      TOTAL KNEE ARTHROPLASTY Right 2017    TUBAL ABDOMINAL LIGATION       Family History   Problem Relation Age of Onset    Breast cancer Sister     Alzheimer's disease Mother     Hypertension Mother     No Known Problems Father     No Known Problems Daughter     No Known Problems Son     No Known Problems Maternal Grandmother     No Known Problems Maternal Grandfather     No Known Problems Paternal Grandmother     No Known Problems Paternal Grandfather     Breast cancer Sister     No Known Problems Daughter     No Known Problems Son     No Known Problems Son       reports that she has never smoked. She has never been exposed to tobacco smoke. She has never used smokeless tobacco. She reports that she does not drink alcohol and does not use drugs.      Current Outpatient Medications:     alendronate (FOSAMAX)  "70 MG tablet, TAKE 1 TABLET BY MOUTH EVERY 7 DAYS., Disp: 12 tablet, Rfl: 3    amLODIPine (NORVASC) 5 MG tablet, TAKE 1 TABLET BY MOUTH EVERY DAY, Disp: 90 tablet, Rfl: 3    Azelastine HCl 137 MCG/SPRAY solution, SPRAY 2 SPRAYS INTO EACH NOSTRIL TWICE A DAY, Disp: 30 mL, Rfl: 3    buPROPion (WELLBUTRIN) 75 MG tablet, Take 1 tablet by mouth Daily., Disp: 90 tablet, Rfl: 3    fluticasone (FLONASE) 50 MCG/ACT nasal spray, SPRAY 1 SPRAY INTO EACH NOSTRIL TWICE A DAY (Patient taking differently: Daily As Needed.), Disp: 16 mL, Rfl: 2    indapamide (LOZOL) 1.25 MG tablet, TAKE 1 TABLET BY MOUTH EVERY DAY, Disp: 90 tablet, Rfl: 3    latanoprost (XALATAN) 0.005 % ophthalmic solution, Administer 1 drop to both eyes every night at bedtime., Disp: , Rfl:     levocetirizine (XYZAL) 5 MG tablet, Take 1 tablet by mouth Daily., Disp: , Rfl:     meclizine (ANTIVERT) 25 MG tablet, TAKE 1 TABLET BY MOUTH 3 TIMES A DAY AS NEEDED FOR DIZZINESS., Disp: 90 tablet, Rfl: 0    naproxen (NAPROSYN) 500 MG tablet, TAKE 1 TABLET BY MOUTH TWICE A DAY WITH MEALS, Disp: 60 tablet, Rfl: 0    rosuvastatin (Crestor) 20 MG tablet, Take 1 tablet by mouth Daily., Disp: 90 tablet, Rfl: 2    traMADol (ULTRAM) 50 MG tablet, Take 1 tablet by mouth Every 6 (Six) Hours As Needed for Moderate Pain., Disp: 30 tablet, Rfl: 0    valACYclovir (VALTREX) 500 MG tablet, TAKE 1 TABLET BY MOUTH EVERY DAY, Disp: 90 tablet, Rfl: 1    OBJECTIVE:  /83 (BP Location: Left arm, Patient Position: Sitting, Cuff Size: Adult)   Pulse 75   Temp 97.5 °F (36.4 °C) (Temporal)   Ht 170.2 cm (67\")   Wt 88.7 kg (195 lb 9.6 oz)   LMP  (LMP Unknown)   SpO2 95%   BMI 30.64 kg/m²    Physical Exam  Vitals reviewed.   Constitutional:       Appearance: She is well-developed.   Cardiovascular:      Rate and Rhythm: Normal rate.   Pulmonary:      Effort: Pulmonary effort is normal.   Neurological:      Mental Status: She is alert and oriented to person, place, and time.      Deep " Tendon Reflexes:      Reflex Scores:       Patellar reflexes are 1+ on the right side and 1+ on the left side.     Comments: Hip flexor strength R 3/5  Knee strength R 5/5  Foot dorsiflexion strength R 5/5   Psychiatric:         Behavior: Behavior normal.       Assessment/Plan    Diagnoses and all orders for this visit:    1. Chronic right-sided low back pain with right-sided sciatica (Primary)  -     MRI Lumbar Spine Without Contrast; Future        An After Visit Summary was printed and given to the patient at discharge.  Return in about 1 month (around 6/20/2024), or if symptoms worsen or fail to improve.       IVETT Francis 5/20/24    Electronically signed.

## 2024-05-21 ENCOUNTER — TELEPHONE (OUTPATIENT)
Dept: FAMILY MEDICINE CLINIC | Facility: CLINIC | Age: 69
End: 2024-05-21
Payer: COMMERCIAL

## 2024-05-21 DIAGNOSIS — N63.20 MASS OF LEFT BREAST, UNSPECIFIED QUADRANT: Primary | ICD-10-CM

## 2024-05-21 NOTE — TELEPHONE ENCOUNTER
fyi:  Kisha Mosher had ultrasound today and a spot was found.  She has already called [facility] and appt scheduled for 06.21.24 @ 1030am.  She is going to call [facility] to see about getting images/reports sent to them.

## 2024-05-22 NOTE — TELEPHONE ENCOUNTER
I'm not sure on this--she didn't mention us needing to send one or provide contact information.      Do you know if she does?

## 2024-05-24 DIAGNOSIS — S32.050A COMPRESSION FRACTURE OF L5 VERTEBRA, INITIAL ENCOUNTER: Primary | ICD-10-CM

## 2024-05-24 NOTE — PROGRESS NOTES
There is a little bit of compression at l5, that could potentially be affecting her foot and causing it to catch. I really don't feel like this is surgical, but I would like to get neurosurgery opinion.

## 2024-05-29 ENCOUNTER — TELEPHONE (OUTPATIENT)
Dept: NEUROSURGERY | Facility: CLINIC | Age: 69
End: 2024-05-29
Payer: COMMERCIAL

## 2024-05-29 NOTE — TELEPHONE ENCOUNTER
LVM TO SET UP PATIENT FOR APPT IN REGARDS TO REFERRAL FOR COMPRESSION FRACTURE.   It is okay for the hub to relay the message to the patient and schedule if possible

## 2024-05-31 ENCOUNTER — TELEPHONE (OUTPATIENT)
Dept: NEUROSURGERY | Facility: CLINIC | Age: 69
End: 2024-05-31
Payer: COMMERCIAL

## 2024-05-31 NOTE — TELEPHONE ENCOUNTER
lvm to set up an appintment due to referral     It is okay for the hub to relay the message to the patient and schedule if possible

## 2024-06-03 ENCOUNTER — OFFICE VISIT (OUTPATIENT)
Dept: FAMILY MEDICINE CLINIC | Facility: CLINIC | Age: 69
End: 2024-06-03
Payer: COMMERCIAL

## 2024-06-03 VITALS
SYSTOLIC BLOOD PRESSURE: 126 MMHG | HEIGHT: 67 IN | OXYGEN SATURATION: 96 % | HEART RATE: 81 BPM | RESPIRATION RATE: 16 BRPM | DIASTOLIC BLOOD PRESSURE: 82 MMHG | BODY MASS INDEX: 30.61 KG/M2 | TEMPERATURE: 98.4 F | WEIGHT: 195 LBS

## 2024-06-03 DIAGNOSIS — J04.0 LARYNGITIS: Primary | ICD-10-CM

## 2024-06-03 DIAGNOSIS — G89.29 CHRONIC RIGHT-SIDED LOW BACK PAIN WITH RIGHT-SIDED SCIATICA: ICD-10-CM

## 2024-06-03 DIAGNOSIS — M54.41 CHRONIC RIGHT-SIDED LOW BACK PAIN WITH RIGHT-SIDED SCIATICA: ICD-10-CM

## 2024-06-03 PROCEDURE — 96372 THER/PROPH/DIAG INJ SC/IM: CPT | Performed by: NURSE PRACTITIONER

## 2024-06-03 PROCEDURE — 87428 SARSCOV & INF VIR A&B AG IA: CPT | Performed by: NURSE PRACTITIONER

## 2024-06-03 PROCEDURE — 99213 OFFICE O/P EST LOW 20 MIN: CPT | Performed by: NURSE PRACTITIONER

## 2024-06-03 RX ORDER — BENZONATATE 100 MG/1
100 CAPSULE ORAL 3 TIMES DAILY PRN
Qty: 30 CAPSULE | Refills: 0 | Status: SHIPPED | OUTPATIENT
Start: 2024-06-03

## 2024-06-03 RX ORDER — TRIAMCINOLONE ACETONIDE 40 MG/ML
40 INJECTION, SUSPENSION INTRA-ARTICULAR; INTRAMUSCULAR ONCE
Status: COMPLETED | OUTPATIENT
Start: 2024-06-03 | End: 2024-06-03

## 2024-06-03 RX ORDER — TRAMADOL HYDROCHLORIDE 50 MG/1
100 TABLET ORAL EVERY 6 HOURS PRN
Start: 2024-06-03

## 2024-06-03 RX ORDER — AZITHROMYCIN 250 MG/1
TABLET, FILM COATED ORAL
Qty: 6 TABLET | Refills: 0 | Status: SHIPPED | OUTPATIENT
Start: 2024-06-03

## 2024-06-03 RX ORDER — PREDNISONE 10 MG/1
TABLET ORAL
Qty: 21 TABLET | Refills: 0 | Status: SHIPPED | OUTPATIENT
Start: 2024-06-03

## 2024-06-03 RX ADMIN — TRIAMCINOLONE ACETONIDE 40 MG: 40 INJECTION, SUSPENSION INTRA-ARTICULAR; INTRAMUSCULAR at 13:04

## 2024-06-03 NOTE — PROGRESS NOTES
CC: sore throat, hoarse voice    History:  Kisha Mosher is a 69 y.o. female who presents today for evaluation of the above problems.      Sore throat and drainage since last Thursday. By Friday she lost her voice.  Has had some cough as well. No fever and no nasal congestion. Did take a tessalon perle that she had at home and it did help with symptoms.     States that the tramadol is not helping with her back pain. She does take naproxen occasionally which helps, but she is leary of this because it irritates her stomach and she is worried of it causing an ulcer.       HPI  ROS:  Review of Systems   HENT:  Positive for sore throat and voice change.    Respiratory:  Positive for cough.        No Known Allergies  Past Medical History:   Diagnosis Date    Abnormal ECG     Abnormal Pap smear of cervix     Anxiety 3/25/22    Arthritis     Cataract 2018    Colon polyp     Depression 22    Essential hypertension 2019    Fibroid uterus     Glaucoma     Hyperlipidemia      Past Surgical History:   Procedure Laterality Date     SECTION      COLONOSCOPY  2016    [facility] [place]    D & C HYSTEROSCOPY MYOSURE N/A 2020    Procedure: DILATATION AND CURETTAGE HYSTEROSCOPY WITH MYOSURE;  Surgeon: Dinh Syed MD;  Location: [facility];  Service: Obstetrics/Gynecology;  Laterality: N/A;    EYE SURGERY Right     JOINT REPLACEMENT      TOTAL KNEE ARTHROPLASTY Right 2017    TUBAL ABDOMINAL LIGATION       Family History   Problem Relation Age of Onset    Breast cancer Sister     Alzheimer's disease Mother     Hypertension Mother     No Known Problems Father     No Known Problems Daughter     No Known Problems Son     No Known Problems Maternal Grandmother     No Known Problems Maternal Grandfather     No Known Problems Paternal Grandmother     No Known Problems Paternal Grandfather     Breast cancer Sister     No Known Problems Daughter     No Known Problems Son     No Known Problems Son        reports that she has never smoked. She has never been exposed to tobacco smoke. She has never used smokeless tobacco. She reports that she does not drink alcohol and does not use drugs.      Current Outpatient Medications:     alendronate (FOSAMAX) 70 MG tablet, TAKE 1 TABLET BY MOUTH EVERY 7 DAYS., Disp: 12 tablet, Rfl: 3    amLODIPine (NORVASC) 5 MG tablet, TAKE 1 TABLET BY MOUTH EVERY DAY, Disp: 90 tablet, Rfl: 3    Azelastine HCl 137 MCG/SPRAY solution, SPRAY 2 SPRAYS INTO EACH NOSTRIL TWICE A DAY, Disp: 30 mL, Rfl: 3    buPROPion (WELLBUTRIN) 75 MG tablet, Take 1 tablet by mouth Daily., Disp: 90 tablet, Rfl: 3    fluticasone (FLONASE) 50 MCG/ACT nasal spray, SPRAY 1 SPRAY INTO EACH NOSTRIL TWICE A DAY (Patient taking differently: Daily As Needed.), Disp: 16 mL, Rfl: 2    indapamide (LOZOL) 1.25 MG tablet, TAKE 1 TABLET BY MOUTH EVERY DAY, Disp: 90 tablet, Rfl: 3    latanoprost (XALATAN) 0.005 % ophthalmic solution, Administer 1 drop to both eyes every night at bedtime., Disp: , Rfl:     levocetirizine (XYZAL) 5 MG tablet, Take 1 tablet by mouth Daily., Disp: , Rfl:     meclizine (ANTIVERT) 25 MG tablet, TAKE 1 TABLET BY MOUTH 3 TIMES A DAY AS NEEDED FOR DIZZINESS., Disp: 90 tablet, Rfl: 0    naproxen (NAPROSYN) 500 MG tablet, TAKE 1 TABLET BY MOUTH TWICE A DAY WITH MEALS, Disp: 60 tablet, Rfl: 0    rosuvastatin (Crestor) 20 MG tablet, Take 1 tablet by mouth Daily., Disp: 90 tablet, Rfl: 2    traMADol (ULTRAM) 50 MG tablet, Take 2 tablets by mouth Every 6 (Six) Hours As Needed for Moderate Pain., Disp: , Rfl:     valACYclovir (VALTREX) 500 MG tablet, TAKE 1 TABLET BY MOUTH EVERY DAY, Disp: 90 tablet, Rfl: 1    azithromycin (Zithromax) 250 MG tablet, Take 2 tablets the first day, then 1 tablet daily for 4 days., Disp: 6 tablet, Rfl: 0    benzonatate (Tessalon Perles) 100 MG capsule, Take 1 capsule by mouth 3 (Three) Times a Day As Needed for Cough., Disp: 30 capsule, Rfl: 0    predniSONE (DELTASONE) 10 MG  "(21) dose pack, Use as directed on package, Disp: 21 tablet, Rfl: 0  No current facility-administered medications for this visit.    OBJECTIVE:  /82 (BP Location: Left arm, Patient Position: Sitting, Cuff Size: Adult)   Pulse 81   Temp 98.4 °F (36.9 °C) (Temporal)   Resp 16   Ht 170.2 cm (67\")   Wt 88.5 kg (195 lb)   LMP  (LMP Unknown)   SpO2 96%   BMI 30.54 kg/m²    Physical Exam  Vitals reviewed.   Constitutional:       Appearance: She is well-developed.   HENT:      Right Ear: Tympanic membrane, ear canal and external ear normal.      Left Ear: Tympanic membrane, ear canal and external ear normal.      Mouth/Throat:      Mouth: Mucous membranes are moist.      Pharynx: Oropharyngeal exudate present.   Cardiovascular:      Rate and Rhythm: Normal rate and regular rhythm.      Heart sounds: Normal heart sounds.   Pulmonary:      Effort: Pulmonary effort is normal.      Breath sounds: Normal breath sounds.   Neurological:      Mental Status: She is alert and oriented to person, place, and time.   Psychiatric:         Behavior: Behavior normal.         Assessment/Plan    Diagnoses and all orders for this visit:    1. Laryngitis (Primary)  -     azithromycin (Zithromax) 250 MG tablet; Take 2 tablets the first day, then 1 tablet daily for 4 days.  Dispense: 6 tablet; Refill: 0  -     predniSONE (DELTASONE) 10 MG (21) dose pack; Use as directed on package  Dispense: 21 tablet; Refill: 0  -     benzonatate (Tessalon Perles) 100 MG capsule; Take 1 capsule by mouth 3 (Three) Times a Day As Needed for Cough.  Dispense: 30 capsule; Refill: 0  -     triamcinolone acetonide (KENALOG-40) injection 40 mg  -     POCT SARS-CoV-2 + Flu Antigen CAMILLE    2. Chronic right-sided low back pain with right-sided sciatica  -     traMADol (ULTRAM) 50 MG tablet; Take 2 tablets by mouth Every 6 (Six) Hours As Needed for Moderate Pain.    Advised that it is ok to increase tramadol to two tablets at a time instead of one.     Swab " negative.           An After Visit Summary was printed and given to the patient at discharge.  Return if symptoms worsen or fail to improve, for Next scheduled follow up.       IVETT Francis 6/3/24    Electronically signed.

## 2024-06-12 ENCOUNTER — TELEPHONE (OUTPATIENT)
Dept: NEUROSURGERY | Facility: CLINIC | Age: 69
End: 2024-06-12
Payer: COMMERCIAL

## 2024-06-12 NOTE — TELEPHONE ENCOUNTER
lvm to see if patient could come in at 1:30 today       It is okay for the hub to relay the message to the patient and schedule if possible

## 2024-06-13 ENCOUNTER — OFFICE VISIT (OUTPATIENT)
Dept: NEUROSURGERY | Facility: CLINIC | Age: 69
End: 2024-06-13
Payer: COMMERCIAL

## 2024-06-13 VITALS — WEIGHT: 194.8 LBS | HEIGHT: 67 IN | BODY MASS INDEX: 30.57 KG/M2

## 2024-06-13 DIAGNOSIS — E66.09 CLASS 1 OBESITY DUE TO EXCESS CALORIES WITHOUT SERIOUS COMORBIDITY WITH BODY MASS INDEX (BMI) OF 30.0 TO 30.9 IN ADULT: ICD-10-CM

## 2024-06-13 DIAGNOSIS — M51.36 DDD (DEGENERATIVE DISC DISEASE), LUMBAR: Primary | ICD-10-CM

## 2024-06-13 DIAGNOSIS — Z78.9 NONSMOKER: ICD-10-CM

## 2024-06-13 DIAGNOSIS — M54.16 LUMBAR RADICULOPATHY: ICD-10-CM

## 2024-06-13 PROBLEM — E66.811 CLASS 1 OBESITY DUE TO EXCESS CALORIES WITHOUT SERIOUS COMORBIDITY WITH BODY MASS INDEX (BMI) OF 30.0 TO 30.9 IN ADULT: Status: ACTIVE | Noted: 2024-06-13

## 2024-06-13 PROBLEM — M51.369 DDD (DEGENERATIVE DISC DISEASE), LUMBAR: Status: ACTIVE | Noted: 2024-06-13

## 2024-06-13 RX ORDER — PREGABALIN 50 MG/1
50 CAPSULE ORAL 3 TIMES DAILY
Qty: 90 CAPSULE | Refills: 0 | Status: SHIPPED | OUTPATIENT
Start: 2024-06-13

## 2024-06-13 NOTE — PATIENT INSTRUCTIONS
You have been referred to [facility] for a right L4-5 epidural steroid injection.  Continue naproxen as current  Lyrica has been sent to the pharmacy to take up to 3 times a day as needed for nerve pain  Avoid aggravating activities and utilize protective body mechanics.  Call the office for sooner appointment if you have any worsening pain, focal weakness, specifically foot drop, difficulty with ambulation or other issues or concerns.

## 2024-06-13 NOTE — PROGRESS NOTES
Chief complaint: Right Leg Pain  Chief Complaint   Patient presents with    Back Pain     COMPRESSION FRACTURE L5 VERTEBRAE-  pt states numbness and tingling down right leg stated numbness and tingling varies on location. It could include toes or could be ankle or stop  at knee. Stated she had PT at [facility] 6 weeks       Subjective     HPI: Kisha presents as a referral request from IVETT Melissa with a 4-month history of radiating pain from her low back into the right buttock down the lateral thigh and into the lateral calf.  Pain stops at the lateral ankle.  Symptoms started after she took a long car ride to [place].  She states that she could hardly walk the next day.  Symptoms have improved but persist.  Pain is most pronounced first thing in the morning when she gets out of bed and with prolonged sitting.  She also has pain whenever she does a lot of standing and walking.  She reports some paresthesias affecting bilateral feet which started prior to the onset of this pain.  She states that about 6 weeks ago her right foot felt weak but that has improved.  She has noticed that the right leg wants to give way but she has not fallen.  She was initially treated with steroids and Toradol.  She is currently utilizing naproxen.  She has attended 6 weeks of physical therapy and although her symptoms are improved, they have not resolved.    Past medical history is significant for osteoporosis, hypertension and hyperlipidemia.  She was recently diagnosed with breast cancer.    She does not smoke, utilize alcohol or drugs.    Review of Systems     Past Medical History:   Diagnosis Date    Abnormal ECG     Abnormal Pap smear of cervix     Anxiety 3/25/22    Arthritis     Cataract 2018    Colon polyp     Depression 22    Essential hypertension 2019    Fibroid uterus     Glaucoma 2020    Hyperlipidemia      Past Surgical History:   Procedure Laterality Date     SECTION       "COLONOSCOPY  08/26/2016    [facility] [place] [address] [facility]    D & C HYSTEROSCOPY MYOSURE N/A 2/19/2020    Procedure: DILATATION AND CURETTAGE HYSTEROSCOPY WITH MYOSURE;  Surgeon: Dinh Syed MD;  Location: [facility];  Service: Obstetrics/Gynecology;  Laterality: N/A;    EYE SURGERY Right     JOINT REPLACEMENT      TOTAL KNEE ARTHROPLASTY Right 03/13/2017    TUBAL ABDOMINAL LIGATION  1995     Family History   Problem Relation Age of Onset    Breast cancer Sister     Alzheimer's disease Mother     Hypertension Mother     No Known Problems Father     No Known Problems Daughter     No Known Problems Son     No Known Problems Maternal Grandmother     No Known Problems Maternal Grandfather     No Known Problems Paternal Grandmother     No Known Problems Paternal Grandfather     Breast cancer Sister     No Known Problems Daughter     No Known Problems Son     No Known Problems Son      Social History     Tobacco Use    Smoking status: Never     Passive exposure: Never    Smokeless tobacco: Never   Vaping Use    Vaping status: Never Used   Substance Use Topics    Alcohol use: No    Drug use: No     (Not in a hospital admission)    Allergies:  Patient has no known allergies.    Objective      Vital Signs  Ht 170.2 cm (67.01\")   Wt 88.4 kg (194 lb 12.8 oz)   LMP  (LMP Unknown)   BMI 30.50 kg/m²     Physical Exam  Constitutional:       Appearance: Normal appearance. She is well-developed.   HENT:      Head: Normocephalic.   Eyes:      General: Lids are normal.      Conjunctiva/sclera: Conjunctivae normal.      Pupils: Pupils are equal, round, and reactive to light.   Cardiovascular:      Rate and Rhythm: Normal rate.   Pulmonary:      Effort: Pulmonary effort is normal.      Breath sounds: Normal breath sounds.   Musculoskeletal:         General: No tenderness. Normal range of motion.      Cervical back: Normal range of motion.   Skin:     General: Skin is warm and dry.   Neurological:      Mental Status: She is alert and oriented " to person, place, and time.      GCS: GCS eye subscore is 4. GCS verbal subscore is 5. GCS motor subscore is 6.      Cranial Nerves: No cranial nerve deficit.      Sensory: Sensory deficit present.      Motor: Weakness present.      Coordination: Coordination normal.      Gait: Gait is intact. Gait normal.      Deep Tendon Reflexes: Reflexes are normal and symmetric. Reflexes normal.      Reflex Scores:       Tricep reflexes are 2+ on the right side and 2+ on the left side.       Bicep reflexes are 2+ on the right side and 2+ on the left side.       Brachioradialis reflexes are 2+ on the right side and 2+ on the left side.       Patellar reflexes are 2+ on the right side and 2+ on the left side.       Achilles reflexes are 2+ on the right side and 2+ on the left side.  Psychiatric:         Speech: Speech normal.         Behavior: Behavior normal.         Thought Content: Thought content normal.         Neurologic Exam     Mental Status   Oriented to person, place, and time.   Speech: speech is normal   Level of consciousness: alert  Knowledge: good.     Cranial Nerves     CN III, IV, VI   Pupils are equal, round, and reactive to light.    Motor Exam   Muscle bulk: normal  Overall muscle tone: normal    Strength   Strength 5/5 except as noted.   Right hamstrin/5    Sensory Exam   Decreased sensation to light touch dorsal and plantar surface of the left foot and dorsal surface of the right foot.     Gait, Coordination, and Reflexes     Gait  Gait: normal    Reflexes   Right brachioradialis: 2+  Left brachioradialis: 2+  Right biceps: 2+  Left biceps: 2+  Right triceps: 2+  Left triceps: 2+  Right patellar: 2+  Left patellar: 2+  Right achilles: 2+  Left achilles: 2+  Right : 2+  Left : 2+      Imaging review: MRI of the lumbar spine was reviewed and demonstrates severe disc degeneration L5-S1.  There is mild bilateral neuroforaminal stenosis.  At L4-5 there is fluid signal in the right facet with synovial  cyst projecting off the medial aspect abutting the thecal sac medially and causing lateral recess stenosis on the right.  There is mild to moderate central stenosis.  No high-grade central or neuroforaminal stenosis throughout.        Assessment/Plan: I reviewed images in detail with Kisha.  She has synovial cyst on the right at L4-5 abutting the thecal sac causing lateral recess stenosis, consistent with her radicular complaints.  She has some right hamstrings weakness but otherwise is neurologically stable.  Her pain is improving.  We discussed treatment options in detail, including possible surgery.    I recommend trying a right L4-5 NATALEE to see if this will give her any more pain relief as well as Lyrica 50 mg 3 times daily as needed nerve pain.  She will continue her naproxen as current.    We discussed avoidance of aggravating activities as well as protective body mechanics.  She will report any worsening weakness to the office at once.    She will follow-up here with me in about 10 weeks, sooner if needed.    I advised the patient to call and return sooner for new or worsening complaints of weakness, paresthesias, gait disturbances, or any additional concerns.  Treatment options discussed in detail with Kisha and the patient voiced understanding.  Ms. Mosher agrees with this plan of care.     Patient is a nonsmoker    The patient's Body mass index is 30.5 kg/m².. BMI is above normal parameters. Recommendations include: educational material    ADVANCED CARE PLANNING  Information on advance directives provided in AVS.    LOLY Fall Risk Assessment was completed, and patient is at LOW risk for falls.Assessment completed on:1/22/2024       Diagnoses and all orders for this visit:    1. DDD (degenerative disc disease), lumbar (Primary)  -     Ambulatory Referral to Pain Management    2. Lumbar radiculopathy  -     pregabalin (Lyrica) 50 MG capsule; Take 1 capsule by mouth 3 (Three) Times a Day.  Dispense: 90  capsule; Refill: 0  -     Ambulatory Referral to Pain Management    3. Nonsmoker    4. Class 1 obesity due to excess calories without serious comorbidity with body mass index (BMI) of 30.0 to 30.9 in adult    I spent 45 minutes caring for Kisha on this date of service. This time includes time spent by me in the following activities: preparing for the visit, reviewing tests, obtaining and/or reviewing a separately obtained history, performing a medically appropriate examination and/or evaluation, counseling and educating the patient/family/caregiver, ordering medications, tests, or procedures, referring and communicating with other health care professionals, documenting information in the medical record, independently interpreting results and communicating that information with the patient/family/caregiver, and care coordination.        I discussed the patients findings and my recommendations with patient    Ashley Mason PA-C  06/13/24  12:57 CDT

## 2024-06-14 DIAGNOSIS — M70.71 BURSITIS OF OTHER BURSA OF RIGHT HIP: ICD-10-CM

## 2024-06-14 RX ORDER — NAPROXEN 500 MG/1
500 TABLET ORAL 2 TIMES DAILY WITH MEALS
Qty: 60 TABLET | Refills: 0 | Status: SHIPPED | OUTPATIENT
Start: 2024-06-14

## 2024-06-14 NOTE — TELEPHONE ENCOUNTER
Rx Refill Note  Requested Prescriptions     Pending Prescriptions Disp Refills    naproxen (NAPROSYN) 500 MG tablet [Pharmacy Med Name: NAPROXEN 500 MG TABLET] 60 tablet 0     Sig: TAKE 1 TABLET BY MOUTH TWICE A DAY WITH MEALS      Last office visit with prescribing clinician: 6/3/2024   Last telemedicine visit with prescribing clinician: Visit date not found   Next office visit with prescribing clinician: 6/24/2024     Kim Baker MA  06/14/24, 07:33 CDT

## 2024-06-21 ENCOUNTER — OFFICE VISIT (OUTPATIENT)
Dept: FAMILY MEDICINE CLINIC | Facility: CLINIC | Age: 69
End: 2024-06-21
Payer: COMMERCIAL

## 2024-06-21 VITALS
TEMPERATURE: 97.4 F | WEIGHT: 197 LBS | SYSTOLIC BLOOD PRESSURE: 140 MMHG | RESPIRATION RATE: 16 BRPM | DIASTOLIC BLOOD PRESSURE: 78 MMHG | OXYGEN SATURATION: 96 % | HEART RATE: 89 BPM | BODY MASS INDEX: 30.92 KG/M2 | HEIGHT: 67 IN

## 2024-06-21 DIAGNOSIS — C50.919 MALIGNANT NEOPLASM OF FEMALE BREAST, UNSPECIFIED ESTROGEN RECEPTOR STATUS, UNSPECIFIED LATERALITY, UNSPECIFIED SITE OF BREAST: ICD-10-CM

## 2024-06-21 DIAGNOSIS — M54.16 LUMBAR RADICULOPATHY: Primary | ICD-10-CM

## 2024-06-21 PROCEDURE — 99212 OFFICE O/P EST SF 10 MIN: CPT | Performed by: NURSE PRACTITIONER

## 2024-06-21 NOTE — PROGRESS NOTES
CC: 1 month follow up - low back pain with sciatica    History:  Kisha Mosher is a 69 y.o. female who presents today for evaluation of the above problems.      Patient returns for one month follow up on back pain. Has completed physical therapy and was seen by neurosurgery last week.   They have ordered lyrica and pain mgmt referral. She does have lumbar radiculopathy as well as arthritis in her low back with some noted sensory deficit. In addition to this, she has had a recent breast cancer diagnosis. Will be having lumpectomy and taking estrogen blocker.   The combination of these issues is causing a great deal of mental stress for her. She does continue on wellbutrin 75 mg daily.     HPI  ROS:  Review of Systems   Musculoskeletal:  Positive for back pain.   Psychiatric/Behavioral:  The patient is nervous/anxious.        No Known Allergies  Past Medical History:   Diagnosis Date    Abnormal ECG     Abnormal Pap smear of cervix     Anxiety 3/25/22    Arthritis     Cataract     Colon polyp     Depression 22    Essential hypertension 2019    Fibroid uterus     Glaucoma     Hyperlipidemia      Past Surgical History:   Procedure Laterality Date     SECTION      [place]    [facility] [place]    D & C HYSTEROSCOPY MYOSURE N/A 2020    Procedure: DILATATION AND CURETTAGE HYSTEROSCOPY WITH MYOSURE;  Surgeon: Dinh Syed MD;  Location: [facility] OR;  Service: Obstetrics/Gynecology;  Laterality: N/A;    EYE SURGERY Right     JOINT REPLACEMENT      TOTAL KNEE ARTHROPLASTY Right 2017    TUBAL ABDOMINAL LIGATION       Family History   Problem Relation Age of Onset    Breast cancer Sister     Alzheimer's disease Mother     Hypertension Mother     No Known Problems Father     No Known Problems Daughter     No Known Problems Son     No Known Problems Maternal Grandmother     No Known Problems Maternal Grandfather     No Known Problems Paternal Grandmother     No Known  Problems Paternal Grandfather     Breast cancer Sister     No Known Problems Daughter     No Known Problems Son     No Known Problems Son       reports that she has never smoked. She has never been exposed to tobacco smoke. She has never used smokeless tobacco. She reports that she does not drink alcohol and does not use drugs.      Current Outpatient Medications:     alendronate (FOSAMAX) 70 MG tablet, TAKE 1 TABLET BY MOUTH EVERY 7 DAYS., Disp: 12 tablet, Rfl: 3    amLODIPine (NORVASC) 5 MG tablet, TAKE 1 TABLET BY MOUTH EVERY DAY, Disp: 90 tablet, Rfl: 3    Azelastine HCl 137 MCG/SPRAY solution, SPRAY 2 SPRAYS INTO EACH NOSTRIL TWICE A DAY, Disp: 30 mL, Rfl: 3    buPROPion (WELLBUTRIN) 75 MG tablet, Take 1 tablet by mouth Daily., Disp: 90 tablet, Rfl: 3    fluticasone (FLONASE) 50 MCG/ACT nasal spray, SPRAY 1 SPRAY INTO EACH NOSTRIL TWICE A DAY (Patient taking differently: Daily As Needed.), Disp: 16 mL, Rfl: 2    indapamide (LOZOL) 1.25 MG tablet, TAKE 1 TABLET BY MOUTH EVERY DAY, Disp: 90 tablet, Rfl: 3    latanoprost (XALATAN) 0.005 % ophthalmic solution, Administer 1 drop to both eyes every night at bedtime., Disp: , Rfl:     levocetirizine (XYZAL) 5 MG tablet, Take 1 tablet by mouth Daily., Disp: , Rfl:     meclizine (ANTIVERT) 25 MG tablet, TAKE 1 TABLET BY MOUTH 3 TIMES A DAY AS NEEDED FOR DIZZINESS., Disp: 90 tablet, Rfl: 0    naproxen (NAPROSYN) 500 MG tablet, TAKE 1 TABLET BY MOUTH TWICE A DAY WITH MEALS, Disp: 60 tablet, Rfl: 0    rosuvastatin (Crestor) 20 MG tablet, Take 1 tablet by mouth Daily., Disp: 90 tablet, Rfl: 2    traMADol (ULTRAM) 50 MG tablet, Take 2 tablets by mouth Every 6 (Six) Hours As Needed for Moderate Pain., Disp: , Rfl:     valACYclovir (VALTREX) 500 MG tablet, TAKE 1 TABLET BY MOUTH EVERY DAY, Disp: 90 tablet, Rfl: 1    pregabalin (Lyrica) 50 MG capsule, Take 1 capsule by mouth 3 (Three) Times a Day. (Patient not taking: Reported on 6/21/2024), Disp: 90 capsule, Rfl:  "0    OBJECTIVE:  /78 (BP Location: Left arm, Patient Position: Sitting, Cuff Size: Adult)   Pulse 89   Temp 97.4 °F (36.3 °C) (Temporal)   Resp 16   Ht 170.2 cm (67\")   Wt 89.4 kg (197 lb)   LMP  (LMP Unknown)   SpO2 96%   BMI 30.85 kg/m²    Physical Exam  Vitals reviewed.   Constitutional:       Appearance: She is well-developed.   Cardiovascular:      Rate and Rhythm: Normal rate.   Pulmonary:      Effort: Pulmonary effort is normal.   Neurological:      Mental Status: She is alert and oriented to person, place, and time.   Psychiatric:         Behavior: Behavior normal.         Assessment/Plan    Diagnoses and all orders for this visit:    1. Lumbar radiculopathy (Primary)    2. Malignant neoplasm of female breast, unspecified estrogen receptor status, unspecified laterality, unspecified site of breast    Patient to follow up per orders of oncology and neurosurgery. Will be glad to facilitate as needed for patient's multiple issues.     An After Visit Summary was printed and given to the patient at discharge.  Return for Next scheduled follow up.       IVETT Francis 6/21/24    Electronically signed.  "

## 2024-07-11 DIAGNOSIS — M70.71 BURSITIS OF OTHER BURSA OF RIGHT HIP: ICD-10-CM

## 2024-07-11 RX ORDER — NAPROXEN 500 MG/1
500 TABLET ORAL 2 TIMES DAILY WITH MEALS
Qty: 60 TABLET | Refills: 0 | Status: SHIPPED | OUTPATIENT
Start: 2024-07-11

## 2024-07-11 NOTE — TELEPHONE ENCOUNTER
Rx Refill Note  Requested Prescriptions     Pending Prescriptions Disp Refills    naproxen (NAPROSYN) 500 MG tablet [Pharmacy Med Name: NAPROXEN 500 MG TABLET] 60 tablet 0     Sig: TAKE 1 TABLET BY MOUTH TWICE A DAY WITH MEALS      Last office visit with prescribing clinician: 6/21/2024   Last telemedicine visit with prescribing clinician: Visit date not found   Next office visit with prescribing clinician: 7/22/2024       {TIP  Please add Last Relevant Lab 1/22/24    Kim Baker MA  07/11/24, 07:08 CDT

## 2024-07-16 ENCOUNTER — OFFICE VISIT (OUTPATIENT)
Dept: FAMILY MEDICINE CLINIC | Facility: CLINIC | Age: 69
End: 2024-07-16
Payer: COMMERCIAL

## 2024-07-16 VITALS
DIASTOLIC BLOOD PRESSURE: 86 MMHG | OXYGEN SATURATION: 98 % | TEMPERATURE: 97.5 F | HEIGHT: 67 IN | BODY MASS INDEX: 30.54 KG/M2 | SYSTOLIC BLOOD PRESSURE: 137 MMHG | WEIGHT: 194.6 LBS | HEART RATE: 75 BPM

## 2024-07-16 DIAGNOSIS — M54.16 LUMBAR RADICULOPATHY: ICD-10-CM

## 2024-07-16 DIAGNOSIS — C50.919 MALIGNANT NEOPLASM OF FEMALE BREAST, UNSPECIFIED ESTROGEN RECEPTOR STATUS, UNSPECIFIED LATERALITY, UNSPECIFIED SITE OF BREAST: ICD-10-CM

## 2024-07-16 DIAGNOSIS — R53.83 FATIGUE, UNSPECIFIED TYPE: ICD-10-CM

## 2024-07-16 DIAGNOSIS — I10 ESSENTIAL HYPERTENSION: Primary | ICD-10-CM

## 2024-07-16 DIAGNOSIS — E78.2 MIXED HYPERLIPIDEMIA: ICD-10-CM

## 2024-07-16 PROCEDURE — 99214 OFFICE O/P EST MOD 30 MIN: CPT | Performed by: NURSE PRACTITIONER

## 2024-07-16 NOTE — PROGRESS NOTES
CC: 6 month check - HTN, lumbar radiculopathy, breast cancer    History:  Kisha Mosher is a 69 y.o. female who presents today for evaluation of the above problems.      Patient presents for 6 month check. 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 Her BP is appropriate. Last colonoscopy was done in  and was normal, however, she did have adenomatous polyps on her 2019 scope. Per Dr. Thompson's office she has a 5 year recall.     HPI  ROS:  Review of Systems   Respiratory:  Negative for shortness of breath.    Cardiovascular:  Negative for chest pain.   Gastrointestinal:  Negative for constipation and diarrhea.   Musculoskeletal:  Positive for back pain.       No Known Allergies  Past Medical History:   Diagnosis Date    Abnormal ECG     Abnormal Pap smear of cervix     Anxiety 3/25/22    Arthritis     Cataract 2018    Colon polyp     Depression 22    Essential hypertension 2019    Fibroid uterus     Glaucoma     Hyperlipidemia      Past Surgical History:   Procedure Laterality Date     SECTION      COLONOSCOPY  2016    [facility] [place]UJILLO    D & C HYSTEROSCOPY MYOSURE N/A 2020    Procedure: DILATATION AND CURETTAGE HYSTEROSCOPY WITH MYOSURE;  Surgeon: Dinh Syed MD;  Location: [facility] OR;  Service: Obstetrics/Gynecology;  Laterality: N/A;    EYE SURGERY Right     JOINT REPLACEMENT      TOTAL KNEE ARTHROPLASTY Right 2017    TUBAL ABDOMINAL LIGATION       Family History   Problem Relation Age of Onset    Breast cancer Sister     Alzheimer's disease Mother     Hypertension Mother     No Known Problems Father     No Known Problems Daughter     No Known Problems Son     No Known Problems Maternal Grandmother     No Known Problems Maternal Grandfather     No Known Problems Paternal Grandmother     No Known Problems Paternal Grandfather      Breast cancer Sister     No Known Problems Daughter     No Known Problems Son     No Known Problems Son       reports that she has never smoked. She has never been exposed to tobacco smoke. She has never used smokeless tobacco. She reports that she does not drink alcohol and does not use drugs.      Current Outpatient Medications:     alendronate (FOSAMAX) 70 MG tablet, TAKE 1 TABLET BY MOUTH EVERY 7 DAYS., Disp: 12 tablet, Rfl: 3    amLODIPine (NORVASC) 5 MG tablet, TAKE 1 TABLET BY MOUTH EVERY DAY, Disp: 90 tablet, Rfl: 3    Azelastine HCl 137 MCG/SPRAY solution, SPRAY 2 SPRAYS INTO EACH NOSTRIL TWICE A DAY, Disp: 30 mL, Rfl: 3    buPROPion (WELLBUTRIN) 75 MG tablet, Take 1 tablet by mouth Daily., Disp: 90 tablet, Rfl: 3    fluticasone (FLONASE) 50 MCG/ACT nasal spray, SPRAY 1 SPRAY INTO EACH NOSTRIL TWICE A DAY (Patient taking differently: Daily As Needed.), Disp: 16 mL, Rfl: 2    indapamide (LOZOL) 1.25 MG tablet, TAKE 1 TABLET BY MOUTH EVERY DAY, Disp: 90 tablet, Rfl: 3    latanoprost (XALATAN) 0.005 % ophthalmic solution, Administer 1 drop to both eyes every night at bedtime., Disp: , Rfl:     meclizine (ANTIVERT) 25 MG tablet, TAKE 1 TABLET BY MOUTH 3 TIMES A DAY AS NEEDED FOR DIZZINESS., Disp: 90 tablet, Rfl: 0    rosuvastatin (Crestor) 20 MG tablet, Take 1 tablet by mouth Daily., Disp: 90 tablet, Rfl: 2    valACYclovir (VALTREX) 500 MG tablet, TAKE 1 TABLET BY MOUTH EVERY DAY, Disp: 90 tablet, Rfl: 1    levocetirizine (XYZAL) 5 MG tablet, Take 1 tablet by mouth Daily. (Patient not taking: Reported on 7/16/2024), Disp: , Rfl:     naproxen (NAPROSYN) 500 MG tablet, TAKE 1 TABLET BY MOUTH TWICE A DAY WITH MEALS (Patient not taking: Reported on 7/16/2024), Disp: 60 tablet, Rfl: 0    pregabalin (Lyrica) 50 MG capsule, Take 1 capsule by mouth 3 (Three) Times a Day. (Patient not taking: Reported on 7/16/2024), Disp: 90 capsule, Rfl: 0    traMADol (ULTRAM) 50 MG tablet, Take 2 tablets by mouth Every 6 (Six) Hours  "As Needed for Moderate Pain. (Patient not taking: Reported on 7/16/2024), Disp: , Rfl:     OBJECTIVE:  /86 (BP Location: Left arm, Patient Position: Sitting, Cuff Size: Adult)   Pulse 75   Temp 97.5 °F (36.4 °C) (Temporal)   Ht 170.2 cm (67\")   Wt 88.3 kg (194 lb 9.6 oz)   LMP  (LMP Unknown)   SpO2 98%   BMI 30.48 kg/m²    Physical Exam  Vitals reviewed.   Constitutional:       Appearance: She is well-developed.   Cardiovascular:      Rate and Rhythm: Normal rate and regular rhythm.      Heart sounds: Normal heart sounds.   Pulmonary:      Effort: Pulmonary effort is normal.      Breath sounds: Normal breath sounds.   Neurological:      Mental Status: She is alert and oriented to person, place, and time.   Psychiatric:         Behavior: Behavior normal.         Assessment/Plan    Diagnoses and all orders for this visit:    1. Essential hypertension (Primary)  -     Comprehensive Metabolic Panel    2. Malignant neoplasm of female breast, unspecified estrogen receptor status, unspecified laterality, unspecified site of breast  -     CBC & Differential    3. Lumbar radiculopathy    4. Mixed hyperlipidemia  -     Lipid Panel    5. Fatigue, unspecified type  -     Comprehensive Metabolic Panel  -     TSH  -     T4, free  -     CBC & Differential        An After Visit Summary was printed and given to the patient at discharge.  Return in about 6 months (around 1/16/2025) for Annual physical.       IVETT Francis 7/16/24    Electronically signed.  "

## 2024-07-17 LAB
ALBUMIN SERPL-MCNC: 4.5 G/DL (ref 3.5–5.2)
ALBUMIN/GLOB SERPL: 2.4 G/DL
ALP SERPL-CCNC: 86 U/L (ref 39–117)
ALT SERPL-CCNC: 17 U/L (ref 1–33)
AST SERPL-CCNC: 22 U/L (ref 1–32)
BASOPHILS # BLD AUTO: 0.04 10*3/MM3 (ref 0–0.2)
BASOPHILS NFR BLD AUTO: 0.9 % (ref 0–1.5)
BILIRUB SERPL-MCNC: 0.5 MG/DL (ref 0–1.2)
BUN SERPL-MCNC: 14 MG/DL (ref 8–23)
BUN/CREAT SERPL: 16.1 (ref 7–25)
CALCIUM SERPL-MCNC: 10 MG/DL (ref 8.6–10.5)
CHLORIDE SERPL-SCNC: 101 MMOL/L (ref 98–107)
CHOLEST SERPL-MCNC: 179 MG/DL (ref 0–200)
CO2 SERPL-SCNC: 29.1 MMOL/L (ref 22–29)
CREAT SERPL-MCNC: 0.87 MG/DL (ref 0.57–1)
EGFRCR SERPLBLD CKD-EPI 2021: 73.1 ML/MIN/1.73
EOSINOPHIL # BLD AUTO: 0.06 10*3/MM3 (ref 0–0.4)
EOSINOPHIL NFR BLD AUTO: 1.3 % (ref 0.3–6.2)
ERYTHROCYTE [DISTWIDTH] IN BLOOD BY AUTOMATED COUNT: 13.6 % (ref 12.3–15.4)
GLOBULIN SER CALC-MCNC: 1.9 GM/DL
GLUCOSE SERPL-MCNC: 95 MG/DL (ref 65–99)
HCT VFR BLD AUTO: 46.5 % (ref 34–46.6)
HDLC SERPL-MCNC: 79 MG/DL (ref 40–60)
HGB BLD-MCNC: 15.2 G/DL (ref 12–15.9)
IMM GRANULOCYTES # BLD AUTO: 0.01 10*3/MM3 (ref 0–0.05)
IMM GRANULOCYTES NFR BLD AUTO: 0.2 % (ref 0–0.5)
LDLC SERPL CALC-MCNC: 86 MG/DL (ref 0–100)
LYMPHOCYTES # BLD AUTO: 1.67 10*3/MM3 (ref 0.7–3.1)
LYMPHOCYTES NFR BLD AUTO: 37.3 % (ref 19.6–45.3)
MCH RBC QN AUTO: 30.8 PG (ref 26.6–33)
MCHC RBC AUTO-ENTMCNC: 32.7 G/DL (ref 31.5–35.7)
MCV RBC AUTO: 94.3 FL (ref 79–97)
MONOCYTES # BLD AUTO: 0.45 10*3/MM3 (ref 0.1–0.9)
MONOCYTES NFR BLD AUTO: 10 % (ref 5–12)
NEUTROPHILS # BLD AUTO: 2.25 10*3/MM3 (ref 1.7–7)
NEUTROPHILS NFR BLD AUTO: 50.3 % (ref 42.7–76)
NRBC BLD AUTO-RTO: 0 /100 WBC (ref 0–0.2)
PLATELET # BLD AUTO: 198 10*3/MM3 (ref 140–450)
POTASSIUM SERPL-SCNC: 4 MMOL/L (ref 3.5–5.2)
PROT SERPL-MCNC: 6.4 G/DL (ref 6–8.5)
RBC # BLD AUTO: 4.93 10*6/MM3 (ref 3.77–5.28)
SODIUM SERPL-SCNC: 140 MMOL/L (ref 136–145)
T4 FREE SERPL-MCNC: 1.51 NG/DL (ref 0.92–1.68)
TRIGL SERPL-MCNC: 73 MG/DL (ref 0–150)
TSH SERPL DL<=0.005 MIU/L-ACNC: 2.93 UIU/ML (ref 0.27–4.2)
VLDLC SERPL CALC-MCNC: 14 MG/DL (ref 5–40)
WBC # BLD AUTO: 4.48 10*3/MM3 (ref 3.4–10.8)

## 2024-08-11 DIAGNOSIS — M70.71 BURSITIS OF OTHER BURSA OF RIGHT HIP: ICD-10-CM

## 2024-08-12 RX ORDER — NAPROXEN 500 MG/1
500 TABLET ORAL 2 TIMES DAILY WITH MEALS
Qty: 60 TABLET | Refills: 0 | Status: SHIPPED | OUTPATIENT
Start: 2024-08-12

## 2024-08-12 NOTE — TELEPHONE ENCOUNTER
Rx Refill Note  Requested Prescriptions     Pending Prescriptions Disp Refills    naproxen (NAPROSYN) 500 MG tablet [Pharmacy Med Name: NAPROXEN 500 MG TABLET] 60 tablet 0     Sig: TAKE 1 TABLET BY MOUTH TWICE A DAY WITH MEALS      Last office visit with prescribing clinician: 7/16/2024   Last telemedicine visit with prescribing clinician: Visit date not found   Next office visit with prescribing clinician: 1/22/2025                         Would you like a call back once the refill request has been completed: [] Yes [] No    If the office needs to give you a call back, can they leave a voicemail: [] Yes [] No    Maty Berg, PCT  08/12/24, 08:11 CDT

## 2024-09-11 ENCOUNTER — OFFICE VISIT (OUTPATIENT)
Dept: FAMILY MEDICINE CLINIC | Facility: CLINIC | Age: 69
End: 2024-09-11
Payer: COMMERCIAL

## 2024-09-11 VITALS
WEIGHT: 190.4 LBS | HEIGHT: 67 IN | BODY MASS INDEX: 29.88 KG/M2 | TEMPERATURE: 97.8 F | HEART RATE: 76 BPM | DIASTOLIC BLOOD PRESSURE: 79 MMHG | OXYGEN SATURATION: 98 % | SYSTOLIC BLOOD PRESSURE: 134 MMHG

## 2024-09-11 DIAGNOSIS — R00.2 PALPITATIONS: Primary | ICD-10-CM

## 2024-09-11 PROCEDURE — 99214 OFFICE O/P EST MOD 30 MIN: CPT | Performed by: NURSE PRACTITIONER

## 2024-09-11 PROCEDURE — 93000 ELECTROCARDIOGRAM COMPLETE: CPT | Performed by: NURSE PRACTITIONER

## 2024-09-11 RX ORDER — LETROZOLE 2.5 MG/1
2.5 TABLET, FILM COATED ORAL DAILY
COMMUNITY
Start: 2024-08-29 | End: 2025-08-30

## 2024-09-11 NOTE — PROGRESS NOTES
CC: vibration in chest    History:  Kisha Mosher is a 69 y.o. female who presents today for evaluation of the above problems.      Patient complains of vibration in her chest that has been occurring less than once a week for the past 3 years. Had a treadmill stress test in  that was low risk. She does not have pain or shortness of breath with episodes.     HPI  ROS:  Review of Systems   Respiratory:  Negative for shortness of breath.    Cardiovascular:  Positive for palpitations. Negative for chest pain.       No Known Allergies  Past Medical History:   Diagnosis Date    Abnormal ECG     Abnormal Pap smear of cervix     Anxiety 3/25/22    Arthritis     Cataract 2018    Colon polyp     Depression 22    Essential hypertension 2019    Fibroid uterus     Glaucoma     Hyperlipidemia      Past Surgical History:   Procedure Laterality Date     SECTION      COLONOSCOPY  2016    [facility] [place]UJILLO    D & C HYSTEROSCOPY MYOSURE N/A 2020    Procedure: DILATATION AND CURETTAGE HYSTEROSCOPY WITH MYOSURE;  Surgeon: Dinh Syed MD;  Location: [facility];  Service: Obstetrics/Gynecology;  Laterality: N/A;    EYE SURGERY Right     JOINT REPLACEMENT      TOTAL KNEE ARTHROPLASTY Right 2017    TUBAL ABDOMINAL LIGATION       Family History   Problem Relation Age of Onset    Breast cancer Sister     Alzheimer's disease Mother     Hypertension Mother     No Known Problems Father     No Known Problems Daughter     No Known Problems Son     No Known Problems Maternal Grandmother     No Known Problems Maternal Grandfather     No Known Problems Paternal Grandmother     No Known Problems Paternal Grandfather     Breast cancer Sister     No Known Problems Daughter     No Known Problems Son     No Known Problems Son       reports that she has never smoked. She has never been exposed to tobacco smoke. She has never used smokeless tobacco. She reports that she does not drink alcohol and does not  use drugs.      Current Outpatient Medications:     alendronate (FOSAMAX) 70 MG tablet, TAKE 1 TABLET BY MOUTH EVERY 7 DAYS., Disp: 12 tablet, Rfl: 3    amLODIPine (NORVASC) 5 MG tablet, TAKE 1 TABLET BY MOUTH EVERY DAY, Disp: 90 tablet, Rfl: 3    Azelastine HCl 137 MCG/SPRAY solution, SPRAY 2 SPRAYS INTO EACH NOSTRIL TWICE A DAY, Disp: 30 mL, Rfl: 3    buPROPion (WELLBUTRIN) 75 MG tablet, Take 1 tablet by mouth Daily., Disp: 90 tablet, Rfl: 3    fluticasone (FLONASE) 50 MCG/ACT nasal spray, SPRAY 1 SPRAY INTO EACH NOSTRIL TWICE A DAY (Patient taking differently: Daily As Needed.), Disp: 16 mL, Rfl: 2    indapamide (LOZOL) 1.25 MG tablet, TAKE 1 TABLET BY MOUTH EVERY DAY, Disp: 90 tablet, Rfl: 3    latanoprost (XALATAN) 0.005 % ophthalmic solution, Administer 1 drop to both eyes every night at bedtime., Disp: , Rfl:     letrozole (FEMARA) 2.5 MG tablet, Take 1 tablet by mouth Daily., Disp: , Rfl:     levocetirizine (XYZAL) 5 MG tablet, Take 1 tablet by mouth Daily., Disp: , Rfl:     meclizine (ANTIVERT) 25 MG tablet, TAKE 1 TABLET BY MOUTH 3 TIMES A DAY AS NEEDED FOR DIZZINESS., Disp: 90 tablet, Rfl: 0    rosuvastatin (Crestor) 20 MG tablet, Take 1 tablet by mouth Daily., Disp: 90 tablet, Rfl: 2    valACYclovir (VALTREX) 500 MG tablet, TAKE 1 TABLET BY MOUTH EVERY DAY, Disp: 90 tablet, Rfl: 1    naproxen (NAPROSYN) 500 MG tablet, TAKE 1 TABLET BY MOUTH TWICE A DAY WITH MEALS (Patient not taking: Reported on 9/11/2024), Disp: 60 tablet, Rfl: 0    pregabalin (Lyrica) 50 MG capsule, Take 1 capsule by mouth 3 (Three) Times a Day. (Patient not taking: Reported on 9/11/2024), Disp: 90 capsule, Rfl: 0    traMADol (ULTRAM) 50 MG tablet, Take 2 tablets by mouth Every 6 (Six) Hours As Needed for Moderate Pain. (Patient not taking: Reported on 9/11/2024), Disp: , Rfl:     OBJECTIVE:  /79 (BP Location: Right arm, Patient Position: Sitting, Cuff Size: Adult)   Pulse 76   Temp 97.8 °F (36.6 °C) (Temporal)   Ht 170.2 cm  "(67\")   Wt 86.4 kg (190 lb 6.4 oz)   LMP  (LMP Unknown)   SpO2 98%   BMI 29.82 kg/m²    Physical Exam  Vitals reviewed.   Constitutional:       Appearance: She is well-developed.   Cardiovascular:      Rate and Rhythm: Normal rate and regular rhythm.      Heart sounds: Normal heart sounds.   Pulmonary:      Effort: Pulmonary effort is normal.      Breath sounds: Normal breath sounds.   Neurological:      Mental Status: She is alert and oriented to person, place, and time.   Psychiatric:         Behavior: Behavior normal.         Assessment/Plan    Diagnoses and all orders for this visit:    1. Palpitations (Primary)  -     Adult Stress Echo W/ Cont or Stress Agent if Necessary Per Protocol; Future  -     Holter Monitor - 72 Hour Up To 15 Days; Future  -     ECG 12 Lead        An After Visit Summary was printed and given to the patient at discharge.  Return if symptoms worsen or fail to improve, for Next scheduled follow up.       Eden Luna, IVETT 9/11/24    Electronically signed.  "

## 2024-09-11 NOTE — PROGRESS NOTES
Procedure     ECG 12 Lead    Date/Time: 9/11/2024 1:21 PM  Performed by: Kim Baker MA    Authorized by: Eden Luna APRN  Comparison: compared with previous ECG   Similar to previous ECG  Rhythm: sinus rhythm  Rate: normal  BPM: 70  ST Segments: ST segments normal  T Waves: T waves normal  QRS axis: normal  Other: no other findings    Clinical impression: normal ECG

## 2024-09-19 ENCOUNTER — HOSPITAL ENCOUNTER (OUTPATIENT)
Dept: CARDIOLOGY | Facility: HOSPITAL | Age: 69
Discharge: HOME | End: 2024-09-19
Admitting: NURSE PRACTITIONER
Payer: COMMERCIAL

## 2024-09-19 VITALS — HEIGHT: 67 IN | WEIGHT: 190.48 LBS | BODY MASS INDEX: 29.9 KG/M2

## 2024-09-19 DIAGNOSIS — R00.2 PALPITATIONS: ICD-10-CM

## 2024-09-19 LAB
BH CV STRESS BP STAGE 1: NORMAL
BH CV STRESS DURATION MIN STAGE 1: 5
BH CV STRESS DURATION SEC STAGE 1: 2
BH CV STRESS GRADE STAGE 1: 10
BH CV STRESS HR STAGE 1: 139
BH CV STRESS METS STAGE 1: 5
BH CV STRESS PROTOCOL 1: NORMAL
BH CV STRESS RECOVERY BP: NORMAL MMHG
BH CV STRESS RECOVERY HR: 75 BPM
BH CV STRESS SPEED STAGE 1: 1.7
BH CV STRESS STAGE 1: 1
MAXIMAL PREDICTED HEART RATE: 153 BPM
PERCENT MAX PREDICTED HR: 90.85 %
STRESS BASELINE BP: NORMAL MMHG
STRESS BASELINE HR: 69 BPM
STRESS PERCENT HR: 107 %
STRESS POST EXERCISE DUR MIN: 5 MIN
STRESS POST EXERCISE DUR SEC: 2 SEC
STRESS POST PEAK BP: NORMAL MMHG
STRESS POST PEAK HR: 139 BPM
STRESS TARGET HR: 130 BPM

## 2024-09-19 PROCEDURE — 93350 STRESS TTE ONLY: CPT

## 2024-09-19 PROCEDURE — 25510000001 PERFLUTREN 6.52 MG/ML SUSPENSION: Performed by: INTERNAL MEDICINE

## 2024-09-19 PROCEDURE — 93017 CV STRESS TEST TRACING ONLY: CPT

## 2024-09-19 RX ADMIN — PERFLUTREN 8.48 MG: 6.52 INJECTION, SUSPENSION INTRAVENOUS at 16:32

## 2024-10-18 DIAGNOSIS — E78.2 MIXED HYPERLIPIDEMIA: ICD-10-CM

## 2024-10-18 RX ORDER — ROSUVASTATIN CALCIUM 20 MG/1
20 TABLET, COATED ORAL DAILY
Qty: 90 TABLET | Refills: 3 | Status: SHIPPED | OUTPATIENT
Start: 2024-10-18

## 2024-10-18 NOTE — TELEPHONE ENCOUNTER
Rx Refill Note  Requested Prescriptions     Pending Prescriptions Disp Refills    rosuvastatin (CRESTOR) 20 MG tablet [Pharmacy Med Name: ROSUVASTATIN CALCIUM 20 MG TAB] 90 tablet 2     Sig: TAKE 1 TABLET BY MOUTH EVERY DAY      Last office visit with prescribing clinician: 9/11/2024   Last telemedicine visit with prescribing clinician: Visit date not found   Next office visit with prescribing clinician: 1/22/2025       {TIP  Please add Last Relevant Lab 7/16/24    Kim Baker MA  10/18/24, 07:34 CDT

## 2024-10-21 RX ORDER — VALACYCLOVIR HYDROCHLORIDE 500 MG/1
TABLET, FILM COATED ORAL
Qty: 90 TABLET | Refills: 1 | Status: SHIPPED | OUTPATIENT
Start: 2024-10-21

## 2024-10-21 NOTE — TELEPHONE ENCOUNTER
Rx Refill Note  Requested Prescriptions     Pending Prescriptions Disp Refills    valACYclovir (VALTREX) 500 MG tablet [Pharmacy Med Name: VALACYCLOVIR  MG TABLET] 90 tablet 1     Sig: TAKE 1 TABLET BY MOUTH EVERY DAY      Last office visit with prescribing clinician: 9/11/2024   Last telemedicine visit with prescribing clinician: Visit date not found   Next office visit with prescribing clinician: 1/22/2025     Kim Baker MA  10/21/24, 07:57 CDT

## 2024-12-08 DIAGNOSIS — I10 ESSENTIAL (PRIMARY) HYPERTENSION: ICD-10-CM

## 2024-12-09 RX ORDER — AMLODIPINE BESYLATE 5 MG/1
TABLET ORAL
Qty: 90 TABLET | Refills: 3 | Status: SHIPPED | OUTPATIENT
Start: 2024-12-09

## 2024-12-09 NOTE — TELEPHONE ENCOUNTER
Rx Refill Note  Requested Prescriptions     Pending Prescriptions Disp Refills    amLODIPine (NORVASC) 5 MG tablet [Pharmacy Med Name: AMLODIPINE BESYLATE 5 MG TAB] 90 tablet 3     Sig: TAKE 1 TABLET BY MOUTH EVERY DAY      Last office visit with prescribing clinician: 9/11/24  Last telemedicine visit with prescribing clinician: Visit date not found   Next office visit with prescribing clinician: 1/22/25    {TIP  Please add Last Relevant Lab 7/16/24    Kim Baker MA  12/09/24, 07:44 CST

## 2025-01-22 ENCOUNTER — OFFICE VISIT (OUTPATIENT)
Dept: FAMILY MEDICINE CLINIC | Facility: CLINIC | Age: 70
End: 2025-01-22
Payer: MEDICARE

## 2025-01-22 VITALS
OXYGEN SATURATION: 97 % | HEIGHT: 67 IN | HEART RATE: 79 BPM | BODY MASS INDEX: 29.98 KG/M2 | TEMPERATURE: 97.8 F | DIASTOLIC BLOOD PRESSURE: 82 MMHG | WEIGHT: 191 LBS | SYSTOLIC BLOOD PRESSURE: 134 MMHG

## 2025-01-22 DIAGNOSIS — Z78.0 POSTMENOPAUSAL: ICD-10-CM

## 2025-01-22 DIAGNOSIS — E78.2 MIXED HYPERLIPIDEMIA: Primary | ICD-10-CM

## 2025-01-22 DIAGNOSIS — R53.83 FATIGUE, UNSPECIFIED TYPE: ICD-10-CM

## 2025-01-22 DIAGNOSIS — I10 ESSENTIAL HYPERTENSION: ICD-10-CM

## 2025-01-22 DIAGNOSIS — Z12.4 CERVICAL CANCER SCREENING: ICD-10-CM

## 2025-01-22 DIAGNOSIS — C50.919 MALIGNANT NEOPLASM OF FEMALE BREAST, UNSPECIFIED ESTROGEN RECEPTOR STATUS, UNSPECIFIED LATERALITY, UNSPECIFIED SITE OF BREAST: ICD-10-CM

## 2025-01-22 DIAGNOSIS — E66.3 OVERWEIGHT: ICD-10-CM

## 2025-01-22 NOTE — PATIENT INSTRUCTIONS
Medicare Wellness  Personal Prevention Plan of Service     Date of Office Visit:    Encounter Provider:  IVETT Merrill  Place of Service:  [facility]  Patient Name: Kisha Mosher  :  1955    As part of the Medicare Wellness portion of your visit today, we are providing you with this personalized preventive plan of services (PPPS). This plan is based upon recommendations of the [place] Preventive Services Task Force (USPSTF) and the Advisory Committee on Immunization Practices (ACIP).    This lists the preventive care services that should be considered, and provides dates of when you are due. Items listed as completed are up-to-date and do not require any further intervention.    Health Maintenance   Topic Date Due    DXA SCAN  2025    COVID-19 Vaccine ( season) 2026 (Originally 2024)    BMI FOLLOWUP  2025    LIPID PANEL  2025    MAMMOGRAM  2025    ANNUAL PHYSICAL  2026    PAP SMEAR  2027    COLORECTAL CANCER SCREENING  2027    TDAP/TD VACCINES (3 - Td or Tdap) 10/07/2029    HEPATITIS C SCREENING  Completed    INFLUENZA VACCINE  Completed    Pneumococcal Vaccine 65+  Completed    ZOSTER VACCINE  Completed       Orders Placed This Encounter   Procedures    DEXA Bone Density Axial     Standing Status:   Future     Standing Expiration Date:   2026     Scheduling Instructions:      Wants to wait until spring March/April - morning     Order Specific Question:   Reason for Exam:     Answer:   post menopausal     Order Specific Question:   Release to patient     Answer:   Routine Release [[id number]]     Order Specific Question:   Does this patient have a diabetic monitoring/medication delivering device on?     Answer:   No     Order Specific Question:   Is patient taking or have taken long term Glucocorticoid (steroids)?     Answer:   No     Order Specific Question:   Does the patient have  rheumatoid arthritis?     Answer:   No     Order Specific Question:   Does the patient have secondary osteoporosis?     Answer:   No    TSH     Order Specific Question:   Release to patient     Answer:   Routine Release [[id number]]    T4, free     Order Specific Question:   Release to patient     Answer:   Routine Release [[id number]]    Comprehensive Metabolic Panel     Order Specific Question:   Release to patient     Answer:   Routine Release [1400000002]    Lipid Panel     Order Specific Question:   Release to patient     Answer:   Routine Release [1400000002]    POC Urinalysis Dipstick, Multipro     Order Specific Question:   Release to patient     Answer:   Routine Release [1400000002]    CBC & Differential     Order Specific Question:   Manual Differential     Answer:   No     Order Specific Question:   Release to patient     Answer:   Routine Release [[id number]]       No follow-ups on file.

## 2025-01-22 NOTE — ASSESSMENT & PLAN NOTE
Patient's (Body mass index is 29.91 kg/m².) indicates that they are overweight with health conditions that include hypertension and dyslipidemias . Weight is unchanged. BMI is above average; BMI management plan is completed. We discussed Information on healthy weight added to patient's after visit summary.

## 2025-01-22 NOTE — PROGRESS NOTES
Subjective   The ABCs of the Annual Wellness Visit  Medicare Wellness Visit      Kisha Mosher is a 69 y.o. patient who presents for a Medicare Wellness Visit.    The following portions of the patient's history were reviewed and   updated as appropriate: allergies, current medications, past family history, past medical history, past social history, past surgical history, and problem list.    Compared to one year ago, the patient's physical   health is the same.  Compared to one year ago, the patient's mental   health is the same.    Recent Hospitalizations:  She was not admitted to the hospital during the last year.     Current Medical Providers:  Patient Care Team:  Eden Luna APRN as PCP - General (Nurse Practitioner)  Monty Ocampo MD as Cardiologist (Cardiology)  Eden Luna APRN as Referring Physician (Nurse Practitioner)  Osvaldo Thompson MD as Consulting Physician (General Surgery)  Ivory Nix MD (Surgical Oncology)    Outpatient Medications Prior to Visit   Medication Sig Dispense Refill   • alendronate (FOSAMAX) 70 MG tablet TAKE 1 TABLET BY MOUTH EVERY 7 DAYS. 12 tablet 3   • amLODIPine (NORVASC) 5 MG tablet TAKE 1 TABLET BY MOUTH EVERY DAY 90 tablet 3   • Azelastine HCl 137 MCG/SPRAY solution SPRAY 2 SPRAYS INTO EACH NOSTRIL TWICE A DAY 30 mL 3   • buPROPion (WELLBUTRIN) 75 MG tablet Take 1 tablet by mouth Daily. 90 tablet 3   • fluticasone (FLONASE) 50 MCG/ACT nasal spray SPRAY 1 SPRAY INTO EACH NOSTRIL TWICE A DAY (Patient taking differently: Daily As Needed.) 16 mL 2   • indapamide (LOZOL) 1.25 MG tablet TAKE 1 TABLET BY MOUTH EVERY DAY 90 tablet 3   • latanoprost (XALATAN) 0.005 % ophthalmic solution Administer 1 drop to both eyes every night at bedtime.     • letrozole (FEMARA) 2.5 MG tablet Take 1 tablet by mouth Daily.     • levocetirizine (XYZAL) 5 MG tablet Take 1 tablet by mouth Daily.     • meclizine (ANTIVERT) 25 MG tablet TAKE 1 TABLET BY MOUTH 3 TIMES A DAY AS NEEDED FOR  DIZZINESS. 90 tablet 0   • naproxen (NAPROSYN) 500 MG tablet TAKE 1 TABLET BY MOUTH TWICE A DAY WITH MEALS 60 tablet 0   • rosuvastatin (CRESTOR) 20 MG tablet TAKE 1 TABLET BY MOUTH EVERY DAY 90 tablet 3   • valACYclovir (VALTREX) 500 MG tablet TAKE 1 TABLET BY MOUTH EVERY DAY 90 tablet 1   • pregabalin (Lyrica) 50 MG capsule Take 1 capsule by mouth 3 (Three) Times a Day. (Patient not taking: Reported on 1/22/2025) 90 capsule 0   • traMADol (ULTRAM) 50 MG tablet Take 2 tablets by mouth Every 6 (Six) Hours As Needed for Moderate Pain. (Patient not taking: Reported on 1/22/2025)       No facility-administered medications prior to visit.     Opioid medication/s are on active medication list.  and I have evaluated her active treatment plan and pain score trends (see table).  Vitals:    01/22/25 0841   PainSc: 0-No pain     I have reviewed the chart for potential of high risk medication and harmful drug interactions in the elderly.        Aspirin is not on active medication list.  Aspirin use is not indicated based on review of current medical condition/s. Risk of harm outweighs potential benefits.  .    Patient Active Problem List   Diagnosis   • Mixed hyperlipidemia   • Hx of adenomatous colonic polyps   • Essential hypertension   • Overweight   • HPV (human papilloma virus) infection   • DDD (degenerative disc disease), lumbar   • Lumbar radiculopathy   • Nonsmoker   • Class 1 obesity due to excess calories without serious comorbidity with body mass index (BMI) of 30.0 to 30.9 in adult   • Breast cancer     Advance Care Planning Advance Directive is not on file.  ACP discussion was held with the patient during this visit. Patient does not have an advance directive, information provided.            Objective   Vitals:    01/22/25 0841   BP: 134/82   BP Location: Right arm   Patient Position: Sitting   Cuff Size: Large Adult   Pulse: 79   Temp: 97.8 °F (36.6 °C)   TempSrc: Temporal   SpO2: 97%   Weight: 86.6 kg (191 lb)  "  Height: 170.2 cm (67\")   PainSc: 0-No pain       Estimated body mass index is 29.91 kg/m² as calculated from the following:    Height as of this encounter: 170.2 cm (67\").    Weight as of this encounter: 86.6 kg (191 lb).                Does the patient have evidence of cognitive impairment? No                                                                                                Health  Risk Assessment    Smoking Status:  Social History     Tobacco Use   Smoking Status Never   • Passive exposure: Never   Smokeless Tobacco Never     Alcohol Consumption:  Social History     Substance and Sexual Activity   Alcohol Use No       Fall Risk Screen  STEADI Fall Risk Assessment was completed, and patient is at MODERATE risk for falls. Assessment completed on:2025    Depression Screening   Little interest or pleasure in doing things? Not at all   Feeling down, depressed, or hopeless? Not at all   PHQ-2 Total Score 0      Health Habits and Functional and Cognitive Screenin/22/2025     8:46 AM   Functional & Cognitive Status   Do you have difficulty preparing food and eating? No   Do you have difficulty bathing yourself, getting dressed or grooming yourself? No   Do you have difficulty using the toilet? No   Do you have difficulty moving around from place to place? No   Do you have trouble with steps or getting out of a bed or a chair? No   Current Diet Limited Junk Food   Dental Exam Up to date   Eye Exam Up to date   Exercise (times per week) 3 times per week   Current Exercises Include No Regular Exercise;Dancing;Walking   Do you need help using the phone?  No   Are you deaf or do you have serious difficulty hearing?  No   Do you need help to go to places out of walking distance? No   Do you need help shopping? No   Do you need help preparing meals?  No   Do you need help with housework?  No   Do you need help with laundry? No   Do you need help taking your medications? No   Do you need help managing " money? No   Do you ever drive or ride in a car without wearing a seat belt? No   Have you felt unusual stress, anger or loneliness in the last month? No   Who do you live with? Other   If you need help, do you have trouble finding someone available to you? No   Have you been bothered in the last four weeks by sexual problems? No   Do you have difficulty concentrating, remembering or making decisions? No           Age-appropriate Screening Schedule:  Refer to the list below for future screening recommendations based on patient's age, sex and/or medical conditions. Orders for these recommended tests are listed in the plan section. The patient has been provided with a written plan.    Health Maintenance List  Health Maintenance   Topic Date Due   • DXA SCAN  04/11/2025   • COVID-19 Vaccine (5 - 2024-25 season) 01/22/2026 (Originally 9/1/2024)   • BMI FOLLOWUP  06/13/2025   • LIPID PANEL  07/16/2025   • MAMMOGRAM  08/06/2025   • ANNUAL PHYSICAL  01/22/2026   • PAP SMEAR  01/22/2027   • COLORECTAL CANCER SCREENING  06/24/2027   • TDAP/TD VACCINES (3 - Td or Tdap) 10/07/2029   • HEPATITIS C SCREENING  Completed   • INFLUENZA VACCINE  Completed   • Pneumococcal Vaccine 65+  Completed   • ZOSTER VACCINE  Completed                                                                                                                                                CMS Preventative Services Quick Reference  Risk Factors Identified During Encounter  Immunizations Discussed/Encouraged: Influenza and COVID19    The above risks/problems have been discussed with the patient.  Pertinent information has been shared with the patient in the After Visit Summary.  An After Visit Summary and PPPS were made available to the patient.    Follow Up:   Next Medicare Wellness visit to be scheduled in 1 year.         Additional E&M Note during same encounter follows:  Patient has additional, significant, and separately identifiable  "condition(s)/problem(s) that require work above and beyond the Medicare Wellness Visit     Chief Complaint  Medicare Wellness-subsequent (Fasting, with pap)    Subjective   HPI  Kisha is also being seen today for an annual adult preventative physical exam.     Patient reports that she is doing well.  She denies any current concerns.  Her weight is stable though elevated with a BMI of 29.9.  Patient reports that her mammogram is handled through [facility].  She will be due for colon cancer screening in 2027.  She is due for a DEXA scan.  Will get Pap done today.  Discussed flu and COVID vaccines.  She has already had her flu vaccine.  She does not want a COVID-vaccine.    Review of Systems   Constitutional:  Negative for fatigue.   HENT:  Negative for congestion.    Respiratory:  Negative for shortness of breath.    Cardiovascular:  Negative for chest pain.   Neurological:  Negative for dizziness and light-headedness.              Objective   Vital Signs:  /82 (BP Location: Right arm, Patient Position: Sitting, Cuff Size: Large Adult)   Pulse 79   Temp 97.8 °F (36.6 °C) (Temporal)   Ht 170.2 cm (67\")   Wt 86.6 kg (191 lb)   SpO2 97%   BMI 29.91 kg/m²   Physical Exam  Vitals reviewed.   Constitutional:       Appearance: She is well-developed.   HENT:      Right Ear: Tympanic membrane and external ear normal.      Left Ear: Tympanic membrane, ear canal and external ear normal.      Mouth/Throat:      Mouth: Mucous membranes are moist.      Pharynx: Oropharynx is clear.   Neck:      Vascular: No carotid bruit.   Cardiovascular:      Rate and Rhythm: Normal rate and regular rhythm.      Heart sounds: Normal heart sounds.   Pulmonary:      Effort: Pulmonary effort is normal.      Breath sounds: Normal breath sounds.   Chest:   Breasts:     Right: Normal.      Left: Normal.   Abdominal:      General: Abdomen is flat. Bowel sounds are normal.      Palpations: Abdomen is soft.      Hernia: There is " no hernia in the left inguinal area or right inguinal area.   Genitourinary:     Exam position: Lithotomy position.      Labia:         Right: No rash, tenderness, lesion or injury.         Left: No rash, tenderness, lesion or injury.       Vagina: Normal.      Cervix: Friability present.      Uterus: Normal.       Adnexa: Right adnexa normal and left adnexa normal.   Lymphadenopathy:      Lower Body: No right inguinal adenopathy. No left inguinal adenopathy.   Neurological:      Mental Status: She is alert and oriented to person, place, and time.   Psychiatric:         Behavior: Behavior normal.                     Assessment and Plan            Mixed hyperlipidemia       Orders:  •  Lipid Panel    Malignant neoplasm of female breast, unspecified estrogen receptor status, unspecified laterality, unspecified site of breast         Overweight  Patient's (Body mass index is 29.91 kg/m².) indicates that they are overweight with health conditions that include hypertension and dyslipidemias . Weight is unchanged. BMI is above average; BMI management plan is completed. We discussed Information on healthy weight added to patient's after visit summary.          Essential hypertension      Orders:  •  Comprehensive Metabolic Panel  •  POC Urinalysis Dipstick, Multipro    Fatigue, unspecified type    Orders:  •  CBC & Differential  •  TSH  •  T4, free    Cervical cancer screening    Orders:  •  IGP, Rfx Aptima HPV ASCU    Postmenopausal    Orders:  •  DEXA Bone Density Axial; Future            Follow Up   No follow-ups on file.  Patient was given instructions and counseling regarding her condition or for health maintenance advice. Please see specific information pulled into the AVS if appropriate.    Eden Luna APRN 1/22/2025

## 2025-01-23 LAB
ALBUMIN SERPL-MCNC: 4.7 G/DL (ref 3.5–5.2)
ALBUMIN/GLOB SERPL: 1.8 G/DL
ALP SERPL-CCNC: 117 U/L (ref 39–117)
ALT SERPL-CCNC: 19 U/L (ref 1–33)
AST SERPL-CCNC: 21 U/L (ref 1–32)
BASOPHILS # BLD AUTO: 0.04 10*3/MM3 (ref 0–0.2)
BASOPHILS NFR BLD AUTO: 0.9 % (ref 0–1.5)
BILIRUB SERPL-MCNC: 0.5 MG/DL (ref 0–1.2)
BUN SERPL-MCNC: 19 MG/DL (ref 8–23)
BUN/CREAT SERPL: 21.8 (ref 7–25)
CALCIUM SERPL-MCNC: 10.6 MG/DL (ref 8.6–10.5)
CHLORIDE SERPL-SCNC: 99 MMOL/L (ref 98–107)
CHOLEST SERPL-MCNC: 188 MG/DL (ref 0–200)
CO2 SERPL-SCNC: 31.1 MMOL/L (ref 22–29)
CREAT SERPL-MCNC: 0.87 MG/DL (ref 0.57–1)
EGFRCR SERPLBLD CKD-EPI 2021: 73.1 ML/MIN/1.73
EOSINOPHIL # BLD AUTO: 0.04 10*3/MM3 (ref 0–0.4)
EOSINOPHIL NFR BLD AUTO: 0.9 % (ref 0.3–6.2)
ERYTHROCYTE [DISTWIDTH] IN BLOOD BY AUTOMATED COUNT: 12.5 % (ref 12.3–15.4)
GLOBULIN SER CALC-MCNC: 2.6 GM/DL
GLUCOSE SERPL-MCNC: 103 MG/DL (ref 65–99)
HCT VFR BLD AUTO: 49.8 % (ref 34–46.6)
HDLC SERPL-MCNC: 70 MG/DL (ref 40–60)
HGB BLD-MCNC: 15.6 G/DL (ref 12–15.9)
IMM GRANULOCYTES # BLD AUTO: 0.01 10*3/MM3 (ref 0–0.05)
IMM GRANULOCYTES NFR BLD AUTO: 0.2 % (ref 0–0.5)
LDLC SERPL CALC-MCNC: 102 MG/DL (ref 0–100)
LYMPHOCYTES # BLD AUTO: 1.56 10*3/MM3 (ref 0.7–3.1)
LYMPHOCYTES NFR BLD AUTO: 35.5 % (ref 19.6–45.3)
MCH RBC QN AUTO: 28.9 PG (ref 26.6–33)
MCHC RBC AUTO-ENTMCNC: 31.3 G/DL (ref 31.5–35.7)
MCV RBC AUTO: 92.4 FL (ref 79–97)
MONOCYTES # BLD AUTO: 0.38 10*3/MM3 (ref 0.1–0.9)
MONOCYTES NFR BLD AUTO: 8.7 % (ref 5–12)
NEUTROPHILS # BLD AUTO: 2.36 10*3/MM3 (ref 1.7–7)
NEUTROPHILS NFR BLD AUTO: 53.8 % (ref 42.7–76)
NRBC BLD AUTO-RTO: 0 /100 WBC (ref 0–0.2)
PLATELET # BLD AUTO: 233 10*3/MM3 (ref 140–450)
POTASSIUM SERPL-SCNC: 3.9 MMOL/L (ref 3.5–5.2)
PROT SERPL-MCNC: 7.3 G/DL (ref 6–8.5)
RBC # BLD AUTO: 5.39 10*6/MM3 (ref 3.77–5.28)
SODIUM SERPL-SCNC: 140 MMOL/L (ref 136–145)
T4 FREE SERPL-MCNC: 1.35 NG/DL (ref 0.92–1.68)
TRIGL SERPL-MCNC: 89 MG/DL (ref 0–150)
TSH SERPL DL<=0.005 MIU/L-ACNC: 2.69 UIU/ML (ref 0.27–4.2)
VLDLC SERPL CALC-MCNC: 16 MG/DL (ref 5–40)
WBC # BLD AUTO: 4.39 10*3/MM3 (ref 3.4–10.8)

## 2025-01-27 LAB
CONV .: NORMAL
CYTOLOGIST CVX/VAG CYTO: NORMAL
CYTOLOGY CVX/VAG DOC CYTO: NORMAL
CYTOLOGY CVX/VAG DOC THIN PREP: NORMAL
DX ICD CODE: NORMAL
Lab: NORMAL
OTHER STN SPEC: NORMAL
STAT OF ADQ CVX/VAG CYTO-IMP: NORMAL

## 2025-02-10 ENCOUNTER — OFFICE VISIT (OUTPATIENT)
Dept: FAMILY MEDICINE CLINIC | Facility: CLINIC | Age: 70
End: 2025-02-10
Payer: MEDICARE

## 2025-02-10 VITALS
HEART RATE: 79 BPM | HEIGHT: 67 IN | OXYGEN SATURATION: 97 % | WEIGHT: 198 LBS | BODY MASS INDEX: 31.08 KG/M2 | SYSTOLIC BLOOD PRESSURE: 148 MMHG | DIASTOLIC BLOOD PRESSURE: 75 MMHG | TEMPERATURE: 97.7 F

## 2025-02-10 DIAGNOSIS — R60.9 SWELLING: Primary | ICD-10-CM

## 2025-02-10 PROCEDURE — 3078F DIAST BP <80 MM HG: CPT | Performed by: NURSE PRACTITIONER

## 2025-02-10 PROCEDURE — 1160F RVW MEDS BY RX/DR IN RCRD: CPT | Performed by: NURSE PRACTITIONER

## 2025-02-10 PROCEDURE — 1159F MED LIST DOCD IN RCRD: CPT | Performed by: NURSE PRACTITIONER

## 2025-02-10 PROCEDURE — 3077F SYST BP >= 140 MM HG: CPT | Performed by: NURSE PRACTITIONER

## 2025-02-10 PROCEDURE — 1126F AMNT PAIN NOTED NONE PRSNT: CPT | Performed by: NURSE PRACTITIONER

## 2025-02-10 PROCEDURE — 99213 OFFICE O/P EST LOW 20 MIN: CPT | Performed by: NURSE PRACTITIONER

## 2025-02-10 RX ORDER — FUROSEMIDE 20 MG/1
20 TABLET ORAL DAILY
Qty: 5 TABLET | Refills: 0 | Status: SHIPPED | OUTPATIENT
Start: 2025-02-10

## 2025-02-10 NOTE — PROGRESS NOTES
CC: Lower extremity swelling    History:  Kisha Mosher is a 69 y.o. female who presents today for evaluation of the above problems.      Patient just got back from a cruise yesterday.  Her weight is up 7 pounds and her blood pressure is elevated.  She complains of swelling in her feet and a rash on her ankles.  Cruce went to the [place].  She mentions [place].  Was not in the pool or hot tub on the cruise and did not do any water excursions.  She did do a walking excursion and 2 other bus excursions.  Obviously her diet was much different than normal on the cruise, however, she states that she did not add salt to any of her meals.      HPI  ROS:  Review of Systems   Cardiovascular:  Positive for leg swelling.   Skin:  Positive for rash.       No Known Allergies  Past Medical History:   Diagnosis Date    Abnormal ECG     Abnormal Pap smear of cervix     Anxiety 3/25/22    Arthritis     Cataract     Colon polyp     Depression 24    Essential hypertension 2019    Fibroid uterus     Glaucoma     Hyperlipidemia      Past Surgical History:   Procedure Laterality Date    BREAST LUMPECTOMY Left 2024    [facility]     SECTION      [place]    [facility] [place]    D & C HYSTEROSCOPY MYOSURE N/A 2020    Procedure: DILATATION AND CURETTAGE HYSTEROSCOPY WITH MYOSURE;  Surgeon: Dinh Syed MD;  Location: [facility] OR;  Service: Obstetrics/Gynecology;  Laterality: N/A;    EYE SURGERY Right     JOINT REPLACEMENT      TOTAL KNEE ARTHROPLASTY Right 2017    TUBAL ABDOMINAL LIGATION       Family History   Problem Relation Age of Onset    Breast cancer Sister     Alzheimer's disease Mother     Hypertension Mother     No Known Problems Father     No Known Problems Daughter     No Known Problems Son     No Known Problems Maternal Grandmother     No Known Problems Maternal Grandfather     No Known Problems Paternal Grandmother     No Known Problems Paternal Grandfather      Breast cancer Sister     No Known Problems Daughter     No Known Problems Son     No Known Problems Son       reports that she has never smoked. She has never been exposed to tobacco smoke. She has never used smokeless tobacco. She reports that she does not drink alcohol and does not use drugs.      Current Outpatient Medications:     alendronate (FOSAMAX) 70 MG tablet, TAKE 1 TABLET BY MOUTH EVERY 7 DAYS., Disp: 12 tablet, Rfl: 3    amLODIPine (NORVASC) 5 MG tablet, TAKE 1 TABLET BY MOUTH EVERY DAY, Disp: 90 tablet, Rfl: 3    Azelastine HCl 137 MCG/SPRAY solution, SPRAY 2 SPRAYS INTO EACH NOSTRIL TWICE A DAY, Disp: 30 mL, Rfl: 3    buPROPion (WELLBUTRIN) 75 MG tablet, Take 1 tablet by mouth Daily., Disp: 90 tablet, Rfl: 3    fluticasone (FLONASE) 50 MCG/ACT nasal spray, SPRAY 1 SPRAY INTO EACH NOSTRIL TWICE A DAY (Patient taking differently: Daily As Needed.), Disp: 16 mL, Rfl: 2    indapamide (LOZOL) 1.25 MG tablet, TAKE 1 TABLET BY MOUTH EVERY DAY, Disp: 90 tablet, Rfl: 3    latanoprost (XALATAN) 0.005 % ophthalmic solution, Administer 1 drop to both eyes every night at bedtime., Disp: , Rfl:     letrozole (FEMARA) 2.5 MG tablet, Take 1 tablet by mouth Daily., Disp: , Rfl:     levocetirizine (XYZAL) 5 MG tablet, Take 1 tablet by mouth Daily., Disp: , Rfl:     meclizine (ANTIVERT) 25 MG tablet, TAKE 1 TABLET BY MOUTH 3 TIMES A DAY AS NEEDED FOR DIZZINESS., Disp: 90 tablet, Rfl: 0    naproxen (NAPROSYN) 500 MG tablet, TAKE 1 TABLET BY MOUTH TWICE A DAY WITH MEALS, Disp: 60 tablet, Rfl: 0    pregabalin (Lyrica) 50 MG capsule, Take 1 capsule by mouth 3 (Three) Times a Day., Disp: 90 capsule, Rfl: 0    rosuvastatin (CRESTOR) 20 MG tablet, TAKE 1 TABLET BY MOUTH EVERY DAY, Disp: 90 tablet, Rfl: 3    traMADol (ULTRAM) 50 MG tablet, Take 2 tablets by mouth Every 6 (Six) Hours As Needed for Moderate Pain., Disp: , Rfl:     valACYclovir (VALTREX) 500 MG tablet, TAKE 1 TABLET BY MOUTH EVERY DAY, Disp: 90 tablet, Rfl: 1     "furosemide (Lasix) 20 MG tablet, Take 1 tablet by mouth Daily., Disp: 5 tablet, Rfl: 0    OBJECTIVE:  /75 (BP Location: Left arm, Patient Position: Sitting, Cuff Size: Adult)   Pulse 79   Temp 97.7 °F (36.5 °C) (Temporal)   Ht 170.2 cm (67\")   Wt 89.8 kg (198 lb)   LMP  (LMP Unknown)   SpO2 97%   BMI 31.01 kg/m²    Physical Exam  Vitals reviewed.   Constitutional:       Appearance: She is well-developed.   Cardiovascular:      Rate and Rhythm: Normal rate and regular rhythm.      Heart sounds: Normal heart sounds.   Pulmonary:      Effort: Pulmonary effort is normal.      Breath sounds: Normal breath sounds.   Musculoskeletal:      Right lower le+ Edema present.      Left lower le+ Edema present.   Neurological:      Mental Status: She is alert and oriented to person, place, and time.   Psychiatric:         Behavior: Behavior normal.         Assessment/Plan    Diagnoses and all orders for this visit:    1. Swelling (Primary)  -     furosemide (Lasix) 20 MG tablet; Take 1 tablet by mouth Daily.  Dispense: 5 tablet; Refill: 0    Advised to use hydrocortisone on the rash.  Will take Lasix for 5 days and follow-up on Friday.  Changes are likely related to change in diet.  Suspect that rash may be simply from the swelling itself.    An After Visit Summary was printed and given to the patient at discharge.  Return in about 4 days (around 2025).       Eden ROOT 2/10/2025    Electronically signed.  "

## 2025-02-14 ENCOUNTER — OFFICE VISIT (OUTPATIENT)
Dept: FAMILY MEDICINE CLINIC | Facility: CLINIC | Age: 70
End: 2025-02-14
Payer: MEDICARE

## 2025-02-14 VITALS
SYSTOLIC BLOOD PRESSURE: 135 MMHG | BODY MASS INDEX: 30.79 KG/M2 | HEIGHT: 67 IN | WEIGHT: 196.2 LBS | HEART RATE: 90 BPM | OXYGEN SATURATION: 98 % | TEMPERATURE: 98 F | DIASTOLIC BLOOD PRESSURE: 80 MMHG

## 2025-02-14 DIAGNOSIS — I10 ESSENTIAL HYPERTENSION: Primary | ICD-10-CM

## 2025-02-14 PROCEDURE — 1160F RVW MEDS BY RX/DR IN RCRD: CPT | Performed by: NURSE PRACTITIONER

## 2025-02-14 PROCEDURE — 99213 OFFICE O/P EST LOW 20 MIN: CPT | Performed by: NURSE PRACTITIONER

## 2025-02-14 PROCEDURE — 3075F SYST BP GE 130 - 139MM HG: CPT | Performed by: NURSE PRACTITIONER

## 2025-02-14 PROCEDURE — 1126F AMNT PAIN NOTED NONE PRSNT: CPT | Performed by: NURSE PRACTITIONER

## 2025-02-14 PROCEDURE — 3079F DIAST BP 80-89 MM HG: CPT | Performed by: NURSE PRACTITIONER

## 2025-02-14 PROCEDURE — 1159F MED LIST DOCD IN RCRD: CPT | Performed by: NURSE PRACTITIONER

## 2025-02-14 NOTE — PROGRESS NOTES
CC: Hypertension and leg swelling    History:  Kisha Mosher is a 69 y.o. female who presents today for evaluation of the above problems.      Patient presents for follow-up on hypertension and leg swelling.  Her swelling has gone down since her last visit and her blood pressure is now appropriate.    Leg Swelling    ROS:  Review of Systems   Cardiovascular:  Negative for leg swelling.       No Known Allergies  Past Medical History:   Diagnosis Date    Abnormal ECG     Abnormal Pap smear of cervix     Anxiety 3/25/22    Arthritis     Cataract 2018    Colon polyp     Depression 24    Essential hypertension 2019    Fibroid uterus     Glaucoma     Hyperlipidemia      Past Surgical History:   Procedure Laterality Date    BREAST LUMPECTOMY Left 2024    [facility]     SECTION      COLONOSCOPY  2016    [facility] [place]    D & C HYSTEROSCOPY MYOSURE N/A 2020    Procedure: DILATATION AND CURETTAGE HYSTEROSCOPY WITH MYOSURE;  Surgeon: Dinh Syed MD;  Location: [facility];  Service: Obstetrics/Gynecology;  Laterality: N/A;    EYE SURGERY Right     JOINT REPLACEMENT      TOTAL KNEE ARTHROPLASTY Right 2017    TUBAL ABDOMINAL LIGATION       Family History   Problem Relation Age of Onset    Breast cancer Sister     Alzheimer's disease Mother     Hypertension Mother     No Known Problems Father     No Known Problems Daughter     No Known Problems Son     No Known Problems Maternal Grandmother     No Known Problems Maternal Grandfather     No Known Problems Paternal Grandmother     No Known Problems Paternal Grandfather     Breast cancer Sister     No Known Problems Daughter     No Known Problems Son     No Known Problems Son       reports that she has never smoked. She has never been exposed to tobacco smoke. She has never used smokeless tobacco. She reports that she does not drink alcohol and does not use drugs.      Current Outpatient Medications:     alendronate (FOSAMAX) 70 MG  "tablet, TAKE 1 TABLET BY MOUTH EVERY 7 DAYS., Disp: 12 tablet, Rfl: 3    amLODIPine (NORVASC) 5 MG tablet, TAKE 1 TABLET BY MOUTH EVERY DAY, Disp: 90 tablet, Rfl: 3    Azelastine HCl 137 MCG/SPRAY solution, SPRAY 2 SPRAYS INTO EACH NOSTRIL TWICE A DAY, Disp: 30 mL, Rfl: 3    buPROPion (WELLBUTRIN) 75 MG tablet, Take 1 tablet by mouth Daily., Disp: 90 tablet, Rfl: 3    fluticasone (FLONASE) 50 MCG/ACT nasal spray, SPRAY 1 SPRAY INTO EACH NOSTRIL TWICE A DAY (Patient taking differently: Daily As Needed.), Disp: 16 mL, Rfl: 2    furosemide (Lasix) 20 MG tablet, Take 1 tablet by mouth Daily., Disp: 5 tablet, Rfl: 0    indapamide (LOZOL) 1.25 MG tablet, TAKE 1 TABLET BY MOUTH EVERY DAY, Disp: 90 tablet, Rfl: 3    latanoprost (XALATAN) 0.005 % ophthalmic solution, Administer 1 drop to both eyes every night at bedtime., Disp: , Rfl:     letrozole (FEMARA) 2.5 MG tablet, Take 1 tablet by mouth Daily., Disp: , Rfl:     levocetirizine (XYZAL) 5 MG tablet, Take 1 tablet by mouth Daily., Disp: , Rfl:     meclizine (ANTIVERT) 25 MG tablet, TAKE 1 TABLET BY MOUTH 3 TIMES A DAY AS NEEDED FOR DIZZINESS., Disp: 90 tablet, Rfl: 0    naproxen (NAPROSYN) 500 MG tablet, TAKE 1 TABLET BY MOUTH TWICE A DAY WITH MEALS, Disp: 60 tablet, Rfl: 0    pregabalin (Lyrica) 50 MG capsule, Take 1 capsule by mouth 3 (Three) Times a Day., Disp: 90 capsule, Rfl: 0    rosuvastatin (CRESTOR) 20 MG tablet, TAKE 1 TABLET BY MOUTH EVERY DAY, Disp: 90 tablet, Rfl: 3    traMADol (ULTRAM) 50 MG tablet, Take 2 tablets by mouth Every 6 (Six) Hours As Needed for Moderate Pain., Disp: , Rfl:     valACYclovir (VALTREX) 500 MG tablet, TAKE 1 TABLET BY MOUTH EVERY DAY, Disp: 90 tablet, Rfl: 1    OBJECTIVE:  /80 (BP Location: Right arm, Patient Position: Sitting, Cuff Size: Adult)   Pulse 90   Temp 98 °F (36.7 °C) (Temporal)   Ht 170.2 cm (67\")   Wt 89 kg (196 lb 3.2 oz)   LMP  (LMP Unknown)   SpO2 98%   BMI 30.73 kg/m²    Physical Exam  Vitals reviewed. "   Constitutional:       Appearance: She is well-developed.   Cardiovascular:      Rate and Rhythm: Normal rate.   Pulmonary:      Effort: Pulmonary effort is normal.   Neurological:      Mental Status: She is alert and oriented to person, place, and time.   Psychiatric:         Behavior: Behavior normal.         Assessment/Plan    Diagnoses and all orders for this visit:    1. Essential hypertension (Primary)    No additional intervention needed.  Follow-up as previously scheduled    An After Visit Summary was printed and given to the patient at discharge.  Return if symptoms worsen or fail to improve, for Next scheduled follow up.       Eden ROOT 2/14/2025    Electronically signed.   Him/He

## 2025-03-25 DIAGNOSIS — I10 ESSENTIAL HYPERTENSION: ICD-10-CM

## 2025-03-25 NOTE — TELEPHONE ENCOUNTER
Rx Refill Note  Requested Prescriptions     Pending Prescriptions Disp Refills    indapamide (LOZOL) 1.25 MG tablet 90 tablet 3     Sig: Take 1 tablet by mouth Every Night.      Last office visit with prescribing clinician: 2/14/2025   Last telemedicine visit with prescribing clinician: Visit date not found   Next office visit with prescribing clinician: 7/22/2025                         Would you like a call back once the refill request has been completed: [] Yes [] No    If the office needs to give you a call back, can they leave a voicemail: [] Yes [] No    Nura Chavez Rep  03/25/25, 13:29 CDT

## 2025-03-26 RX ORDER — INDAPAMIDE 1.25 MG/1
1.25 TABLET ORAL NIGHTLY
Qty: 90 TABLET | Refills: 3 | Status: SHIPPED | OUTPATIENT
Start: 2025-03-26

## 2025-05-26 DIAGNOSIS — M85.80 OSTEOPENIA, UNSPECIFIED LOCATION: ICD-10-CM

## 2025-05-27 RX ORDER — ALENDRONATE SODIUM 70 MG/1
70 TABLET ORAL WEEKLY
Qty: 12 TABLET | Refills: 3 | Status: SHIPPED | OUTPATIENT
Start: 2025-05-27

## 2025-05-27 NOTE — TELEPHONE ENCOUNTER
Rx Refill Note  Requested Prescriptions     Pending Prescriptions Disp Refills    alendronate (FOSAMAX) 70 MG tablet [Pharmacy Med Name: ALENDRONATE SODIUM 70 MG TAB] 12 tablet 3     Sig: TAKE 1 TABLET BY MOUTH EVERY 7 DAYS.      Last office visit with prescribing clinician: 2/14/2025   Last telemedicine visit with prescribing clinician: Visit date not found   Next office visit with prescribing clinician: 7/22/2025     Kim Baker MA  05/27/25, 07:23 CDT

## 2025-06-02 RX ORDER — VALACYCLOVIR HYDROCHLORIDE 500 MG/1
500 TABLET, FILM COATED ORAL DAILY
Qty: 90 TABLET | Refills: 1 | Status: SHIPPED | OUTPATIENT
Start: 2025-06-02

## 2025-06-02 NOTE — TELEPHONE ENCOUNTER
Rx Refill Note  Requested Prescriptions     Pending Prescriptions Disp Refills    valACYclovir (VALTREX) 500 MG tablet [Pharmacy Med Name: VALACYCLOVIR  MG TABLET] 90 tablet 1     Sig: TAKE 1 TABLET BY MOUTH EVERY DAY      Last office visit with prescribing clinician: 2/14/2025   Last telemedicine visit with prescribing clinician: Visit date not found   Next office visit with prescribing clinician: 7/22/2025       Vida Nichols, KELLY  06/02/25, 09:24 CDT

## 2025-06-25 DIAGNOSIS — F41.9 ANXIETY: ICD-10-CM

## 2025-06-25 RX ORDER — BUPROPION HYDROCHLORIDE 75 MG/1
75 TABLET ORAL DAILY
Qty: 90 TABLET | Refills: 3 | Status: SHIPPED | OUTPATIENT
Start: 2025-06-25

## 2025-06-25 NOTE — TELEPHONE ENCOUNTER
Rx Refill Note  Requested Prescriptions     Pending Prescriptions Disp Refills    buPROPion (WELLBUTRIN) 75 MG tablet [Pharmacy Med Name: BUPROPION HCL 75 MG TABLET] 90 tablet 3     Sig: TAKE 1 TABLET BY MOUTH EVERY DAY      Last office visit with prescribing clinician: 2/14/2025   Last telemedicine visit with prescribing clinician: Visit date not found   Next office visit with prescribing clinician: 7/22/2025       Kim Baker MA  06/25/25, 12:01 CDT

## 2025-07-22 ENCOUNTER — OFFICE VISIT (OUTPATIENT)
Dept: FAMILY MEDICINE CLINIC | Facility: CLINIC | Age: 70
End: 2025-07-22
Payer: MEDICARE

## 2025-07-22 VITALS
HEART RATE: 78 BPM | DIASTOLIC BLOOD PRESSURE: 79 MMHG | OXYGEN SATURATION: 97 % | SYSTOLIC BLOOD PRESSURE: 134 MMHG | HEIGHT: 67 IN | TEMPERATURE: 97.6 F | BODY MASS INDEX: 30.42 KG/M2 | WEIGHT: 193.8 LBS

## 2025-07-22 DIAGNOSIS — I10 ESSENTIAL HYPERTENSION: Primary | ICD-10-CM

## 2025-07-22 DIAGNOSIS — E66.09 CLASS 1 OBESITY DUE TO EXCESS CALORIES WITHOUT SERIOUS COMORBIDITY WITH BODY MASS INDEX (BMI) OF 30.0 TO 30.9 IN ADULT: ICD-10-CM

## 2025-07-22 DIAGNOSIS — C50.919 MALIGNANT NEOPLASM OF FEMALE BREAST, UNSPECIFIED ESTROGEN RECEPTOR STATUS, UNSPECIFIED LATERALITY, UNSPECIFIED SITE OF BREAST: ICD-10-CM

## 2025-07-22 DIAGNOSIS — E66.811 CLASS 1 OBESITY DUE TO EXCESS CALORIES WITHOUT SERIOUS COMORBIDITY WITH BODY MASS INDEX (BMI) OF 30.0 TO 30.9 IN ADULT: ICD-10-CM

## 2025-07-22 DIAGNOSIS — E78.2 MIXED HYPERLIPIDEMIA: ICD-10-CM

## 2025-07-22 PROBLEM — E66.3 OVERWEIGHT: Status: RESOLVED | Noted: 2023-08-22 | Resolved: 2025-07-22

## 2025-07-22 PROCEDURE — 1126F AMNT PAIN NOTED NONE PRSNT: CPT | Performed by: NURSE PRACTITIONER

## 2025-07-22 PROCEDURE — 3078F DIAST BP <80 MM HG: CPT | Performed by: NURSE PRACTITIONER

## 2025-07-22 PROCEDURE — 99214 OFFICE O/P EST MOD 30 MIN: CPT | Performed by: NURSE PRACTITIONER

## 2025-07-22 PROCEDURE — 3075F SYST BP GE 130 - 139MM HG: CPT | Performed by: NURSE PRACTITIONER

## 2025-07-22 NOTE — PROGRESS NOTES
CC:6 month check - HTN, hyperlipidemia    History:  Kisha Mosher is a 70 y.o. female who presents today for evaluation of the above problems.     Patient presents for 6 month check on HTN, hyperlipidemia. Reports that she has stopped taking her fosamax and rosuvastatin. She has increased dietary calcium and has been watching her diet. Also taking daily prenatal vitamin.   BP is appropriate today. Last DEXA scan did show some improvement.      HPI  ROS:  Review of Systems   Respiratory:  Negative for shortness of breath.    Cardiovascular:  Negative for chest pain.       No Known Allergies  Past Medical History:   Diagnosis Date    Abnormal ECG     Abnormal Pap smear of cervix     Anxiety 3/25/22    Arthritis     Cataract 2018    Colon polyp     Depression 24    Essential hypertension 2019    Fibroid uterus     Glaucoma     Hyperlipidemia      Past Surgical History:   Procedure Laterality Date    BREAST LUMPECTOMY Left 2024    [facility]     SECTION      COLONOSCOPY  2016    [facility] [place] DR TRUJILLO    D & C HYSTEROSCOPY MYOSURE N/A 2020    Procedure: DILATATION AND CURETTAGE HYSTEROSCOPY WITH MYOSURE;  Surgeon: Dinh Syed MD;  Location: [facility];  Service: Obstetrics/Gynecology;  Laterality: N/A;    EYE SURGERY Right     JOINT REPLACEMENT      TOTAL KNEE ARTHROPLASTY Right 2017    TUBAL ABDOMINAL LIGATION       Family History   Problem Relation Age of Onset    Breast cancer Sister     Alzheimer's disease Mother     Hypertension Mother     No Known Problems Father     No Known Problems Daughter     No Known Problems Son     No Known Problems Maternal Grandmother     No Known Problems Maternal Grandfather     No Known Problems Paternal Grandmother     No Known Problems Paternal Grandfather     Breast cancer Sister     No Known Problems Daughter     No Known Problems Son     No Known Problems Son       reports that she has never smoked. She has never been exposed to  "tobacco smoke. She has never used smokeless tobacco. She reports that she does not drink alcohol and does not use drugs.      Current Outpatient Medications:     amLODIPine (NORVASC) 5 MG tablet, TAKE 1 TABLET BY MOUTH EVERY DAY, Disp: 90 tablet, Rfl: 3    Azelastine HCl 137 MCG/SPRAY solution, SPRAY 2 SPRAYS INTO EACH NOSTRIL TWICE A DAY, Disp: 30 mL, Rfl: 3    fluticasone (FLONASE) 50 MCG/ACT nasal spray, SPRAY 1 SPRAY INTO EACH NOSTRIL TWICE A DAY (Patient taking differently: Daily As Needed.), Disp: 16 mL, Rfl: 2    indapamide (LOZOL) 1.25 MG tablet, Take 1 tablet by mouth Every Night., Disp: 90 tablet, Rfl: 3    latanoprost (XALATAN) 0.005 % ophthalmic solution, Administer 1 drop to both eyes every night at bedtime., Disp: , Rfl:     letrozole (FEMARA) 2.5 MG tablet, Take 1 tablet by mouth Daily., Disp: , Rfl:     levocetirizine (XYZAL) 5 MG tablet, Take 1 tablet by mouth Daily., Disp: , Rfl:     MAGNESIUM PO, Take  by mouth., Disp: , Rfl:     meclizine (ANTIVERT) 25 MG tablet, TAKE 1 TABLET BY MOUTH 3 TIMES A DAY AS NEEDED FOR DIZZINESS., Disp: 90 tablet, Rfl: 0    naproxen (NAPROSYN) 500 MG tablet, TAKE 1 TABLET BY MOUTH TWICE A DAY WITH MEALS, Disp: 60 tablet, Rfl: 0    Prenatal Vit-Fe Fumarate-FA (PRENATAL VITAMIN PO), Take  by mouth., Disp: , Rfl:     valACYclovir (VALTREX) 500 MG tablet, TAKE 1 TABLET BY MOUTH EVERY DAY, Disp: 90 tablet, Rfl: 1    OBJECTIVE:  /79 (BP Location: Left arm, Patient Position: Sitting, Cuff Size: Adult)   Pulse 78   Temp 97.6 °F (36.4 °C) (Temporal)   Ht 170.2 cm (67\")   Wt 87.9 kg (193 lb 12.8 oz)   LMP  (LMP Unknown)   SpO2 97%   BMI 30.35 kg/m²    Physical Exam  Vitals reviewed.   Constitutional:       Appearance: She is well-developed.   Cardiovascular:      Rate and Rhythm: Normal rate and regular rhythm.      Heart sounds: Normal heart sounds.   Pulmonary:      Effort: Pulmonary effort is normal.      Breath sounds: Normal breath sounds.   Neurological:      " Mental Status: She is alert and oriented to person, place, and time.   Psychiatric:         Behavior: Behavior normal.         Assessment/Plan    Diagnoses and all orders for this visit:    1. Essential hypertension (Primary)  -     Comprehensive Metabolic Panel    2. Mixed hyperlipidemia  -     Lipid Panel    3. Class 1 obesity due to excess calories without serious comorbidity with body mass index (BMI) of 30.0 to 30.9 in adult    4. Malignant neoplasm of female breast, unspecified estrogen receptor status, unspecified laterality, unspecified site of breast        An After Visit Summary was printed and given to the patient at discharge.  Return if symptoms worsen or fail to improve, for Annual physical.       Eden Luna, IVETT 7/22/25    Electronically signed.

## 2025-07-23 LAB
ALBUMIN SERPL-MCNC: 4.5 G/DL (ref 3.5–5.2)
ALBUMIN/GLOB SERPL: 1.7 G/DL
ALP SERPL-CCNC: 118 U/L (ref 39–117)
ALT SERPL-CCNC: 15 U/L (ref 1–33)
AST SERPL-CCNC: 19 U/L (ref 1–32)
BILIRUB SERPL-MCNC: 0.6 MG/DL (ref 0–1.2)
BUN SERPL-MCNC: 13 MG/DL (ref 8–23)
BUN/CREAT SERPL: 16.7 (ref 7–25)
CALCIUM SERPL-MCNC: 9.7 MG/DL (ref 8.6–10.5)
CHLORIDE SERPL-SCNC: 99 MMOL/L (ref 98–107)
CHOLEST SERPL-MCNC: 259 MG/DL (ref 0–200)
CO2 SERPL-SCNC: 28.2 MMOL/L (ref 22–29)
CREAT SERPL-MCNC: 0.78 MG/DL (ref 0.57–1)
EGFRCR SERPLBLD CKD-EPI 2021: 82.9 ML/MIN/1.73
GLOBULIN SER CALC-MCNC: 2.7 GM/DL
GLUCOSE SERPL-MCNC: 99 MG/DL (ref 65–99)
HDLC SERPL-MCNC: 67 MG/DL (ref 40–60)
LDLC SERPL CALC-MCNC: 178 MG/DL (ref 0–100)
POTASSIUM SERPL-SCNC: 3.5 MMOL/L (ref 3.5–5.2)
PROT SERPL-MCNC: 7.2 G/DL (ref 6–8.5)
SODIUM SERPL-SCNC: 140 MMOL/L (ref 136–145)
TRIGL SERPL-MCNC: 85 MG/DL (ref 0–150)
VLDLC SERPL CALC-MCNC: 14 MG/DL (ref 5–40)

## 2025-08-25 ENCOUNTER — OFFICE VISIT (OUTPATIENT)
Dept: CARDIOLOGY | Facility: CLINIC | Age: 70
End: 2025-08-25
Payer: MEDICARE

## 2025-08-25 VITALS
HEART RATE: 70 BPM | OXYGEN SATURATION: 98 % | HEIGHT: 67 IN | BODY MASS INDEX: 30.61 KG/M2 | DIASTOLIC BLOOD PRESSURE: 80 MMHG | SYSTOLIC BLOOD PRESSURE: 126 MMHG | WEIGHT: 195 LBS

## 2025-08-25 DIAGNOSIS — E78.00 PURE HYPERCHOLESTEROLEMIA: Primary | ICD-10-CM

## 2025-08-25 DIAGNOSIS — E66.09 CLASS 1 OBESITY DUE TO EXCESS CALORIES WITHOUT SERIOUS COMORBIDITY WITH BODY MASS INDEX (BMI) OF 30.0 TO 30.9 IN ADULT: ICD-10-CM

## 2025-08-25 DIAGNOSIS — I10 ESSENTIAL HYPERTENSION: ICD-10-CM

## 2025-08-25 DIAGNOSIS — E66.811 CLASS 1 OBESITY DUE TO EXCESS CALORIES WITHOUT SERIOUS COMORBIDITY WITH BODY MASS INDEX (BMI) OF 30.0 TO 30.9 IN ADULT: ICD-10-CM

## 2025-08-25 PROCEDURE — 99204 OFFICE O/P NEW MOD 45 MIN: CPT | Performed by: INTERNAL MEDICINE

## 2025-08-25 PROCEDURE — 3079F DIAST BP 80-89 MM HG: CPT | Performed by: INTERNAL MEDICINE

## 2025-08-25 PROCEDURE — 93000 ELECTROCARDIOGRAM COMPLETE: CPT | Performed by: INTERNAL MEDICINE

## 2025-08-25 PROCEDURE — 1159F MED LIST DOCD IN RCRD: CPT | Performed by: INTERNAL MEDICINE

## 2025-08-25 PROCEDURE — 3074F SYST BP LT 130 MM HG: CPT | Performed by: INTERNAL MEDICINE

## 2025-08-25 PROCEDURE — 1160F RVW MEDS BY RX/DR IN RCRD: CPT | Performed by: INTERNAL MEDICINE

## 2025-08-25 RX ORDER — EZETIMIBE 10 MG/1
10 TABLET ORAL DAILY
Qty: 30 TABLET | Refills: 11 | Status: SHIPPED | OUTPATIENT
Start: 2025-08-25

## (undated) DEVICE — TRY PREP SCRB VAG PVP

## (undated) DEVICE — GOWN,SIRUS,NON REINFRCD,LARGE,SET IN SL: Brand: MEDLINE

## (undated) DEVICE — GOWN,NON-REINFORCED,SIRUS,SET IN SLV,XXL: Brand: MEDLINE

## (undated) DEVICE — PK TURNOVER RM ADV

## (undated) DEVICE — GLV SURG BIOGEL M LTX PF 8

## (undated) DEVICE — GAUZE,SPONGE,4"X4",16PLY,XRAY,STRL,LF: Brand: MEDLINE

## (undated) DEVICE — COLLECTION SOCK, GENERAL: Brand: DEROYAL

## (undated) DEVICE — TOWEL,OR,DSP,ST,BLUE,DLX,10/PK,8PK/CS: Brand: MEDLINE

## (undated) DEVICE — CVR HNDL LIGHT RIGID

## (undated) DEVICE — TBG CONN INFLOW AQUILEX/MYOSURE

## (undated) DEVICE — TBG CONN OUTFLOW AQUILEX/MYOSURE

## (undated) DEVICE — SEAL HYSTERSCOPE/OUTFLOW CHANNEL MYOSURE

## (undated) DEVICE — PAD,PREPPING,CUFFED,24X48,7",NONSTERILE: Brand: MEDLINE

## (undated) DEVICE — PACK,SET UP,NO DRAPES: Brand: MEDLINE

## (undated) DEVICE — DRSNG TELFA PAD NONADH STR 1S 3X8IN